# Patient Record
Sex: FEMALE | Race: OTHER | NOT HISPANIC OR LATINO | Employment: UNEMPLOYED | URBAN - METROPOLITAN AREA
[De-identification: names, ages, dates, MRNs, and addresses within clinical notes are randomized per-mention and may not be internally consistent; named-entity substitution may affect disease eponyms.]

---

## 2017-04-10 ENCOUNTER — TELEPHONE (OUTPATIENT)
Dept: FAMILY MEDICINE | Facility: CLINIC | Age: 37
End: 2017-04-10

## 2017-04-10 ENCOUNTER — PATIENT MESSAGE (OUTPATIENT)
Dept: FAMILY MEDICINE | Facility: CLINIC | Age: 37
End: 2017-04-10

## 2017-04-10 DIAGNOSIS — Z79.899 MEDICATION MANAGEMENT: ICD-10-CM

## 2017-04-10 DIAGNOSIS — Z13.220 SCREENING, LIPID: ICD-10-CM

## 2017-04-10 DIAGNOSIS — I95.9 HYPOTENSION, UNSPECIFIED HYPOTENSION TYPE: ICD-10-CM

## 2017-04-10 DIAGNOSIS — R53.83 FATIGUE, UNSPECIFIED TYPE: Primary | ICD-10-CM

## 2017-04-10 NOTE — TELEPHONE ENCOUNTER
Patient will be seeing my Wednesday, can you please schedule her for fasting labs tomorrow in advance of the visit? Thanks!

## 2017-04-10 NOTE — TELEPHONE ENCOUNTER
----- Message from Kings Aiken sent at 4/10/2017  9:22 AM CDT -----  Contact: Pichardo//[phone]  She has low bp and has been dizzy for the last three days and would like to be seen today.

## 2017-04-10 NOTE — TELEPHONE ENCOUNTER
----- Message from Angelo Ricardo sent at 4/10/2017 10:17 AM CDT -----  Contact: Kylee ()/[phone]  Patient's  called to possibly reschedule appointment.  Please advise.

## 2017-04-12 ENCOUNTER — LAB VISIT (OUTPATIENT)
Dept: LAB | Facility: HOSPITAL | Age: 37
End: 2017-04-12
Attending: FAMILY MEDICINE
Payer: COMMERCIAL

## 2017-04-12 ENCOUNTER — OFFICE VISIT (OUTPATIENT)
Dept: FAMILY MEDICINE | Facility: CLINIC | Age: 37
End: 2017-04-12
Payer: COMMERCIAL

## 2017-04-12 VITALS
TEMPERATURE: 99 F | HEIGHT: 61 IN | OXYGEN SATURATION: 99 % | SYSTOLIC BLOOD PRESSURE: 98 MMHG | BODY MASS INDEX: 24.92 KG/M2 | RESPIRATION RATE: 16 BRPM | DIASTOLIC BLOOD PRESSURE: 61 MMHG | WEIGHT: 132 LBS | HEART RATE: 70 BPM

## 2017-04-12 DIAGNOSIS — R00.2 PALPITATIONS: ICD-10-CM

## 2017-04-12 DIAGNOSIS — Z79.899 MEDICATION MANAGEMENT: ICD-10-CM

## 2017-04-12 DIAGNOSIS — I95.9 HYPOTENSION, UNSPECIFIED HYPOTENSION TYPE: ICD-10-CM

## 2017-04-12 DIAGNOSIS — R20.0 NUMBNESS AND TINGLING: ICD-10-CM

## 2017-04-12 DIAGNOSIS — K21.9 GASTROESOPHAGEAL REFLUX DISEASE, ESOPHAGITIS PRESENCE NOT SPECIFIED: ICD-10-CM

## 2017-04-12 DIAGNOSIS — R53.81 OTHER MALAISE AND FATIGUE: ICD-10-CM

## 2017-04-12 DIAGNOSIS — Z13.220 SCREENING, LIPID: ICD-10-CM

## 2017-04-12 DIAGNOSIS — R20.2 NUMBNESS AND TINGLING: ICD-10-CM

## 2017-04-12 DIAGNOSIS — R53.83 OTHER MALAISE AND FATIGUE: ICD-10-CM

## 2017-04-12 DIAGNOSIS — I95.9 HYPOTENSION, UNSPECIFIED HYPOTENSION TYPE: Primary | ICD-10-CM

## 2017-04-12 DIAGNOSIS — R53.83 FATIGUE, UNSPECIFIED TYPE: ICD-10-CM

## 2017-04-12 LAB
25(OH)D3+25(OH)D2 SERPL-MCNC: 19 NG/ML
ALBUMIN SERPL BCP-MCNC: 4.2 G/DL
ALP SERPL-CCNC: 64 U/L
ALT SERPL W/O P-5'-P-CCNC: 24 U/L
ANION GAP SERPL CALC-SCNC: 8 MMOL/L
AST SERPL-CCNC: 25 U/L
BASOPHILS # BLD AUTO: 0.04 K/UL
BASOPHILS NFR BLD: 0.5 %
BILIRUB SERPL-MCNC: 0.3 MG/DL
BUN SERPL-MCNC: 12 MG/DL
CALCIUM SERPL-MCNC: 9.6 MG/DL
CHLORIDE SERPL-SCNC: 107 MMOL/L
CHOLEST/HDLC SERPL: 2.9 {RATIO}
CO2 SERPL-SCNC: 26 MMOL/L
CREAT SERPL-MCNC: 0.8 MG/DL
DIFFERENTIAL METHOD: NORMAL
EOSINOPHIL # BLD AUTO: 0.1 K/UL
EOSINOPHIL NFR BLD: 1.9 %
ERYTHROCYTE [DISTWIDTH] IN BLOOD BY AUTOMATED COUNT: 13.6 %
EST. GFR  (AFRICAN AMERICAN): >60 ML/MIN/1.73 M^2
EST. GFR  (NON AFRICAN AMERICAN): >60 ML/MIN/1.73 M^2
FERRITIN SERPL-MCNC: 61 NG/ML
GLUCOSE SERPL-MCNC: 78 MG/DL
HCT VFR BLD AUTO: 39.5 %
HDL/CHOLESTEROL RATIO: 34.7 %
HDLC SERPL-MCNC: 144 MG/DL
HDLC SERPL-MCNC: 50 MG/DL
HGB BLD-MCNC: 13 G/DL
IRON SERPL-MCNC: 52 UG/DL
LDLC SERPL CALC-MCNC: 80.6 MG/DL
LYMPHOCYTES # BLD AUTO: 2.5 K/UL
LYMPHOCYTES NFR BLD: 32.9 %
MCH RBC QN AUTO: 28.1 PG
MCHC RBC AUTO-ENTMCNC: 32.9 %
MCV RBC AUTO: 85 FL
MONOCYTES # BLD AUTO: 0.4 K/UL
MONOCYTES NFR BLD: 5.7 %
NEUTROPHILS # BLD AUTO: 4.4 K/UL
NEUTROPHILS NFR BLD: 58.7 %
NONHDLC SERPL-MCNC: 94 MG/DL
PLATELET # BLD AUTO: 254 K/UL
PMV BLD AUTO: 11.4 FL
POTASSIUM SERPL-SCNC: 4.2 MMOL/L
PROT SERPL-MCNC: 7.6 G/DL
RBC # BLD AUTO: 4.63 M/UL
SATURATED IRON: 13 %
SODIUM SERPL-SCNC: 141 MMOL/L
TOTAL IRON BINDING CAPACITY: 388 UG/DL
TRANSFERRIN SERPL-MCNC: 262 MG/DL
TRIGL SERPL-MCNC: 67 MG/DL
TSH SERPL DL<=0.005 MIU/L-ACNC: 2.53 UIU/ML
TSH SERPL DL<=0.005 MIU/L-ACNC: 2.53 UIU/ML
WBC # BLD AUTO: 7.54 K/UL

## 2017-04-12 PROCEDURE — 84443 ASSAY THYROID STIM HORMONE: CPT

## 2017-04-12 PROCEDURE — 82728 ASSAY OF FERRITIN: CPT

## 2017-04-12 PROCEDURE — 83540 ASSAY OF IRON: CPT

## 2017-04-12 PROCEDURE — 80061 LIPID PANEL: CPT

## 2017-04-12 PROCEDURE — 1160F RVW MEDS BY RX/DR IN RCRD: CPT | Mod: S$GLB,,, | Performed by: FAMILY MEDICINE

## 2017-04-12 PROCEDURE — 99999 PR PBB SHADOW E&M-EST. PATIENT-LVL III: CPT | Mod: PBBFAC,,, | Performed by: FAMILY MEDICINE

## 2017-04-12 PROCEDURE — 99214 OFFICE O/P EST MOD 30 MIN: CPT | Mod: S$GLB,,, | Performed by: FAMILY MEDICINE

## 2017-04-12 PROCEDURE — 80053 COMPREHEN METABOLIC PANEL: CPT

## 2017-04-12 PROCEDURE — 83036 HEMOGLOBIN GLYCOSYLATED A1C: CPT

## 2017-04-12 PROCEDURE — 85025 COMPLETE CBC W/AUTO DIFF WBC: CPT

## 2017-04-12 PROCEDURE — 82306 VITAMIN D 25 HYDROXY: CPT

## 2017-04-12 PROCEDURE — 36415 COLL VENOUS BLD VENIPUNCTURE: CPT

## 2017-04-12 NOTE — PROGRESS NOTES
Office Visit    Patient Name: Daya Limon    : 1980  MRN: [id number]    Subjective:  Daya is a 37 y.o. female who presents today for:    Dizziness (states that B/P readings have bben low); Hand Pain (right hand); and Numbness (to feet)      Patient was feeling overall well until about 8 days ago she started noticing episodes of of lightheadedness, especially with going from sitting to standing and with being on feet for a while. No falls. Feels like she may faint and like she is weak.  Blood pressures have been in the 80s systolic, though this is not much lower than her usual baseline of 90s systolic. Not generally drinking much water. No fevers/chills/ sore throat/ diarrhea. Having regular menstrual cycles that are not reported as overly heavy.     C/o right hand pain upon awakening that at times travels up right arm but this pain totally goes away once she gets up and starts moving around.  No joint pain, redness or swelling. Has some tinging in the bottoms of her feet on occasion but it is not overly bothersome or accompanied by back pain or leg weakness.       Past Medical History  No past medical history on file.    Past Surgical History  Past Surgical History:   Procedure Laterality Date    VAGINAL DELIVERY      X2       Family History  Family History   Problem Relation Age of Onset    Diabetes Mother     Diabetes Father     Kidney disease Father     Kidney failure Father     Breast cancer Neg Hx     Colon cancer Neg Hx     Ovarian cancer Neg Hx     Hypertension Neg Hx     Stroke Neg Hx        Social History  Social History     Social History    Marital status:      Spouse name: N/A    Number of children: 2    Years of education: N/A     Occupational History    Not on file.     Social History Main Topics    Smoking status: Never Smoker    Smokeless tobacco: Never Used    Alcohol use No    Drug use: No    Sexual activity: Yes     Partners: Male     Birth control/ protection:  "None     Other Topics Concern    Not on file     Social History Narrative       Current Medications  Medications reviewed and updated.     Allergies   Review of patient's allergies indicates:  No Known Allergies    Review of Systems (Pertinent positives)  Review of Systems   Constitutional: Positive for fatigue. Negative for chills and fever.   Respiratory: Negative for shortness of breath.    Cardiovascular: Positive for palpitations (when lightheaded).   Gastrointestinal: Negative for diarrhea, nausea and vomiting.   Genitourinary: Negative for menstrual problem.   Neurological: Positive for light-headedness and numbness (more tingling on bottoms of feets). Negative for syncope.       BP 98/61  Pulse 70  Temp 98.7 °F (37.1 °C) (Oral)   Resp 16  Ht 5' 1" (1.549 m)  Wt 59.9 kg (132 lb)  LMP  (LMP Unknown)  SpO2 99%  BMI 24.94 kg/m2    Physical Exam   Constitutional: She is oriented to person, place, and time. She appears well-developed and well-nourished. No distress.   HENT:   Head: Normocephalic and atraumatic.   Eyes: Conjunctivae are normal.   Cardiovascular: Normal rate, regular rhythm, normal heart sounds and intact distal pulses.    Pulmonary/Chest: Effort normal and breath sounds normal.   Musculoskeletal: She exhibits no edema.        Right shoulder: She exhibits pain (positive empty can test but otw overall unremarkable exam). She exhibits normal range of motion and no swelling.   Neurological: She is alert and oriented to person, place, and time.   Skin: Skin is warm and dry.   Psychiatric: She has a normal mood and affect.         Assessment/Plan:  Daya Limon is a 37 y.o. female who presents today for :    Daya was seen today for dizziness, hand pain and numbness.    Diagnoses and all orders for this visit:    Hypotension, unspecified hypotension type  Comments:  check labs and increase water consumption  Orders:  -     Ferritin; Future  -     IRON AND TIBC; Future    Gastroesophageal reflux " disease, esophagitis presence not specified  Comments:  continue Nexium as needed or consider Zantac 150-300 mg daily     Numbness and tingling  Comments:  no concerning neurologic features-- check labs for sugar, thyroid etc    Palpitations  Comments:  associated with lower BP readings, pending lab results and how she feels with increased fluids, consider holter monitor as next step in work up             ICD-10-CM ICD-9-CM    1. Hypotension, unspecified hypotension type I95.9 458.9 Ferritin      IRON AND TIBC    check labs and increase water consumption   2. Gastroesophageal reflux disease, esophagitis presence not specified K21.9 530.81     continue Nexium as needed or consider Zantac 150-300 mg daily    3. Numbness and tingling R20.0 782.0     R20.2      no concerning neurologic features-- check labs for sugar, thyroid etc   4. Palpitations R00.2 785.1     associated with lower BP readings, pending lab results and how she feels with increased fluids, consider holter monitor as next step in work up        Return for return as needed for new concerns and will call with lab results.

## 2017-04-13 LAB
ESTIMATED AVG GLUCOSE: 111 MG/DL
HBA1C MFR BLD HPLC: 5.5 %

## 2017-04-16 ENCOUNTER — TELEPHONE (OUTPATIENT)
Dept: FAMILY MEDICINE | Facility: CLINIC | Age: 37
End: 2017-04-16

## 2017-04-16 DIAGNOSIS — E55.9 VITAMIN D DEFICIENCY: ICD-10-CM

## 2017-04-16 RX ORDER — ERGOCALCIFEROL 1.25 MG/1
50000 CAPSULE ORAL
Qty: 15 CAPSULE | Refills: 3 | Status: SHIPPED | OUTPATIENT
Start: 2017-04-16 | End: 2018-08-27

## 2017-04-18 ENCOUNTER — TELEPHONE (OUTPATIENT)
Dept: FAMILY MEDICINE | Facility: CLINIC | Age: 37
End: 2017-04-18

## 2017-06-13 ENCOUNTER — OFFICE VISIT (OUTPATIENT)
Dept: OPTOMETRY | Facility: CLINIC | Age: 37
End: 2017-06-13
Payer: COMMERCIAL

## 2017-06-13 DIAGNOSIS — H04.123 DRY EYE SYNDROME, BILATERAL: ICD-10-CM

## 2017-06-13 DIAGNOSIS — H52.203 HYPEROPIC ASTIGMATISM, BILATERAL: ICD-10-CM

## 2017-06-13 DIAGNOSIS — R51.9 HEADACHE, UNSPECIFIED HEADACHE TYPE: Primary | ICD-10-CM

## 2017-06-13 PROCEDURE — 99999 PR PBB SHADOW E&M-EST. PATIENT-LVL I: CPT | Mod: PBBFAC,,, | Performed by: OPTOMETRIST

## 2017-06-13 PROCEDURE — 92015 DETERMINE REFRACTIVE STATE: CPT | Mod: S$GLB,,, | Performed by: OPTOMETRIST

## 2017-06-13 PROCEDURE — 92014 COMPRE OPH EXAM EST PT 1/>: CPT | Mod: S$GLB,,, | Performed by: OPTOMETRIST

## 2017-06-13 NOTE — LETTER
June 13, 2017      [place] [id number] [facility] [address]           [facility]  [address]  [place] [address]  Phone: [phone]  Fax: [phone]          Patient: Daya Limon   MR Number: [id number]   YOB: 1980   Date of Visit: 6/13/2017       Dear [facility]:    Thank you for referring Daya Limon to me for evaluation. Attached you will find relevant portions of my assessment and plan of care.    If you have questions, please do not hesitate to call me. I look forward to following Daya Limon along with you.    Sincerely,    Brittni Kumar, OD    Enclosure  CC:  No Recipients    If you would like to receive this communication electronically, please contact [email or web address] or [phone] to request more information on [facility] Link access.    For providers and/or their staff who would like to refer a patient to [facility], please contact us through our one-stop-shop provider referral line, Nathen Campbell, at [phone].    If you feel you have received this communication in error or would no longer like to receive these types of communications, please e-mail [email or web address]

## 2017-06-13 NOTE — PROGRESS NOTES
Subjective:       Patient ID: Daya Limon is a 37 y.o. female      Chief Complaint   Patient presents with    Concerns About Ocular Health     History of Present Illness  Dls: 2/3/16 Dr. Buckley    Pt c/o blurry vision at near ou, pt unsure how long it's been going on.   First started noticing in May. Pt does not wear any glasses. Pt c/o hoover's,   takes tylenol for relief. Has not seen PCP. 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 No floaters. Pt c/o dry eyes ou itching ou no tearing no burning no pain.     Eye meds:   Visine ou prn         Assessment/Plan:     1. 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 Most likely eye strain due to refractive error. Pt to try out glasses for near work, if symptoms don't improve, recommend follow up with PCP for workup.    2. Dry eye syndrome, bilateral  Recommend artificial tears. 1 drop 4x per day and PRN. Discussed different drop options - AT/PFAT/gel/ointment. Chronicity of disease and treatment discussed.    3. Hyperopic astigmatism, bilateral  Educated patient on refractive error and discussed lens options. Near work only. Dispensed updated spectacle Rx. Educated about adaptation period to new specs.    Eyeglass Final Rx     Eyeglass Final Rx       Sphere Cylinder Axis    Right +0.75 +0.75 100    Left +1.00 +0.50 060    Type:  SVL-near only    Expiration Date:  6/14/2018                  Return in about 1 year (around 6/13/2018) for Annual eye exam.

## 2017-09-12 ENCOUNTER — OFFICE VISIT (OUTPATIENT)
Dept: FAMILY MEDICINE | Facility: CLINIC | Age: 37
End: 2017-09-12
Attending: FAMILY MEDICINE
Payer: COMMERCIAL

## 2017-09-12 VITALS
TEMPERATURE: 99 F | HEIGHT: 61 IN | WEIGHT: 140.19 LBS | BODY MASS INDEX: 26.47 KG/M2 | SYSTOLIC BLOOD PRESSURE: 97 MMHG | DIASTOLIC BLOOD PRESSURE: 73 MMHG | OXYGEN SATURATION: 100 % | HEART RATE: 74 BPM

## 2017-09-12 DIAGNOSIS — R50.9 FEVER, UNSPECIFIED FEVER CAUSE: Primary | ICD-10-CM

## 2017-09-12 DIAGNOSIS — J02.9 SORETHROAT: ICD-10-CM

## 2017-09-12 LAB
CTP QC/QA: YES
CTP QC/QA: YES
FLUAV AG NPH QL: NEGATIVE
FLUBV AG NPH QL: NEGATIVE
S PYO RRNA THROAT QL PROBE: NEGATIVE

## 2017-09-12 PROCEDURE — 99999 PR PBB SHADOW E&M-EST. PATIENT-LVL III: CPT | Mod: PBBFAC,,, | Performed by: FAMILY MEDICINE

## 2017-09-12 PROCEDURE — 87804 INFLUENZA ASSAY W/OPTIC: CPT | Mod: QW,S$GLB,, | Performed by: FAMILY MEDICINE

## 2017-09-12 PROCEDURE — 3008F BODY MASS INDEX DOCD: CPT | Mod: S$GLB,,, | Performed by: FAMILY MEDICINE

## 2017-09-12 PROCEDURE — 87880 STREP A ASSAY W/OPTIC: CPT | Mod: QW,S$GLB,, | Performed by: FAMILY MEDICINE

## 2017-09-12 PROCEDURE — 99214 OFFICE O/P EST MOD 30 MIN: CPT | Mod: 25,S$GLB,, | Performed by: FAMILY MEDICINE

## 2017-09-12 PROCEDURE — 87081 CULTURE SCREEN ONLY: CPT

## 2017-09-12 NOTE — PROGRESS NOTES
Subjective:       Patient ID: Daya Limon is a 37 y.o. female.    Chief Complaint: Fever; Sore Throat; and Jaw Pain    37 yr old pleasant  female with no significant medical history presents today as new patient to me and for evaluation of fever and sore throat x 1 day. She has sick contacts as her two of her daughters tested positive for strep throat. Mild cough and burning pain in throat and also jaw pain. Details as follows -    History as below - reviewed       Sore Throat    This is a new problem. The current episode started yesterday. The problem has been unchanged. The pain is worse on the right side. The maximum temperature recorded prior to her arrival was 100.4 - 100.9 F. The fever has been present for less than 1 day. The pain is at a severity of 3/10. The pain is mild. Associated symptoms include congestion and coughing. Pertinent negatives include no ear discharge, headaches or shortness of breath. She has had exposure to strep. She has had no exposure to mono. She has tried NSAIDs and gargles for the symptoms. The treatment provided no relief.   Fever    This is a new problem. The current episode started yesterday. The problem occurs intermittently. The problem has been gradually improving. The maximum temperature noted was 100 to 100.9 F. The temperature was taken using a tympanic thermometer. Associated symptoms include congestion, coughing and a sore throat. Pertinent negatives include no chest pain, headaches, nausea, urinary pain or wheezing. She has tried acetaminophen, NSAIDs and fluids for the symptoms. The treatment provided mild relief.   Risk factors: sick contacts    Risk factors: no contaminated food, no contaminated water, no hx of cancer, no recent sickness and no recent travel      Review of Systems   Constitutional: Positive for fever. Negative for activity change, diaphoresis and unexpected weight change.   HENT: Positive for congestion, mouth sores, postnasal drip, rhinorrhea  and sore throat. Negative for ear discharge, hearing loss and voice change.    Eyes: Negative.  Negative for pain, discharge and visual disturbance.   Respiratory: Positive for cough. Negative for chest tightness, shortness of breath and wheezing.    Cardiovascular: Negative.  Negative for chest pain.   Gastrointestinal: Negative.  Negative for abdominal distention, anal bleeding, constipation and nausea.   Endocrine: Negative.  Negative for cold intolerance, polydipsia and polyuria.   Genitourinary: Negative.  Negative for decreased urine volume, difficulty urinating, dysuria, frequency, menstrual problem and vaginal pain.   Musculoskeletal: Positive for arthralgias and myalgias. Negative for back pain and gait problem.   Skin: Negative.  Negative for color change, pallor and wound.   Allergic/Immunologic: Negative.  Negative for environmental allergies and immunocompromised state.   Neurological: Negative.  Negative for dizziness, tremors, seizures, speech difficulty and headaches.   Hematological: Negative.  Negative for adenopathy. Does not bruise/bleed easily.   Psychiatric/Behavioral: Negative.  Negative for agitation, confusion, decreased concentration, hallucinations, self-injury and suicidal ideas. The patient is not nervous/anxious.        PMH/PSH/FH/SH/MED/ALLERGY reviewed    Objective:       Vitals:    09/12/17 1336   BP: 97/73   Pulse: 74   Temp: 99 °F (37.2 °C)       Physical Exam   Constitutional: She is oriented to person, place, and time. She appears well-developed and well-nourished. No distress.   HENT:   Head: Normocephalic and atraumatic.   Right Ear: External ear normal.   Left Ear: External ear normal.   Nose: Nose normal.   Mouth/Throat: Posterior oropharyngeal edema and posterior oropharyngeal erythema present. No oropharyngeal exudate.       Eyes: Conjunctivae and EOM are normal. Pupils are equal, round, and reactive to light. Right eye exhibits no discharge. Left eye exhibits no discharge.  No scleral icterus.   Neck: Normal range of motion. Neck supple. No JVD present. No tracheal deviation present. No thyromegaly present.   Cardiovascular: Normal rate, regular rhythm, normal heart sounds and intact distal pulses.  Exam reveals no gallop and no friction rub.    No murmur heard.  Pulmonary/Chest: Effort normal and breath sounds normal. No stridor. She has no wheezes. She has no rales. She exhibits no tenderness.   Abdominal: Soft. Bowel sounds are normal. She exhibits no distension and no mass. There is no tenderness. There is no rebound and no guarding. No hernia.   Musculoskeletal: Normal range of motion. She exhibits no edema or tenderness.   Lymphadenopathy:     She has no cervical adenopathy.   Neurological: She is alert and oriented to person, place, and time. She has normal reflexes. She displays normal reflexes. No cranial nerve deficit. She exhibits normal muscle tone. Coordination normal.   Skin: Skin is warm and dry. No rash noted. She is not diaphoretic. No erythema. No pallor.   Psychiatric: She has a normal mood and affect. Her behavior is normal. Judgment and thought content normal.       Office Visit on 09/12/2017   Component Date Value Ref Range Status    Rapid Strep A Screen 09/12/2017 Negative  Negative Final     Acceptable 09/12/2017 Yes   Final    Rapid Influenza A Ag 09/12/2017 Negative  Negative Final    Rapid Influenza B Ag 09/12/2017 Negative  Negative Final     Acceptable 09/12/2017 Yes   Final       Assessment:       1. Fever, unspecified fever cause    2. Sorethroat        Plan:       Daya was seen today for fever, sore throat and jaw pain.    Diagnoses and all orders for this visit:    Fever, unspecified fever cause  -     POCT Rapid Strep A  -     POCT Influenza A/B  -     Strep A culture, throat    Sorethroat  -     POCT Rapid Strep A  -     POCT Influenza A/B  -     Strep A culture, throat      Fever/sorethroat  -Advised warm liquids,  warm/salt water gargles, halls lozenges, avoid cold drinks/rocio/ice cream  -rapid strep and flu negative  -culture  -likely viral or allergic    Spent adequate time in obtaining history and explaining differentials    40 minutes spent during this visit of which greater than 50% devoted to face-face counseling and coordination of care regarding diagnosis and management plan    Return if symptoms worsen or fail to improve.

## 2017-09-15 LAB — BACTERIA THROAT CULT: NORMAL

## 2018-08-27 ENCOUNTER — OFFICE VISIT (OUTPATIENT)
Dept: FAMILY MEDICINE | Facility: CLINIC | Age: 38
End: 2018-08-27
Payer: COMMERCIAL

## 2018-08-27 ENCOUNTER — LAB VISIT (OUTPATIENT)
Dept: LAB | Facility: HOSPITAL | Age: 38
End: 2018-08-27
Attending: FAMILY MEDICINE
Payer: COMMERCIAL

## 2018-08-27 VITALS
HEIGHT: 61 IN | BODY MASS INDEX: 26.02 KG/M2 | HEART RATE: 77 BPM | SYSTOLIC BLOOD PRESSURE: 105 MMHG | WEIGHT: 137.81 LBS | OXYGEN SATURATION: 97 % | DIASTOLIC BLOOD PRESSURE: 73 MMHG

## 2018-08-27 DIAGNOSIS — Z79.899 MEDICATION MANAGEMENT: ICD-10-CM

## 2018-08-27 DIAGNOSIS — Z13.220 LIPID SCREENING: ICD-10-CM

## 2018-08-27 DIAGNOSIS — N92.6 MENSTRUAL IRREGULARITY: ICD-10-CM

## 2018-08-27 DIAGNOSIS — N92.6 MENSTRUAL IRREGULARITY: Primary | ICD-10-CM

## 2018-08-27 DIAGNOSIS — R30.0 DYSURIA: ICD-10-CM

## 2018-08-27 DIAGNOSIS — E55.9 VITAMIN D DEFICIENCY: ICD-10-CM

## 2018-08-27 DIAGNOSIS — K21.9 GASTROESOPHAGEAL REFLUX DISEASE, ESOPHAGITIS PRESENCE NOT SPECIFIED: ICD-10-CM

## 2018-08-27 DIAGNOSIS — G56.21 CUBITAL TUNNEL SYNDROME ON RIGHT: ICD-10-CM

## 2018-08-27 LAB
25(OH)D3+25(OH)D2 SERPL-MCNC: 13 NG/ML
ALBUMIN SERPL BCP-MCNC: 4.3 G/DL
ALP SERPL-CCNC: 73 U/L
ALT SERPL W/O P-5'-P-CCNC: 22 U/L
ANION GAP SERPL CALC-SCNC: 4 MMOL/L
AST SERPL-CCNC: 25 U/L
BASOPHILS # BLD AUTO: 0.02 K/UL
BASOPHILS NFR BLD: 0.3 %
BILIRUB SERPL-MCNC: 0.5 MG/DL
BUN SERPL-MCNC: 10 MG/DL
CALCIUM SERPL-MCNC: 9.6 MG/DL
CHLORIDE SERPL-SCNC: 106 MMOL/L
CHOLEST SERPL-MCNC: 182 MG/DL
CHOLEST/HDLC SERPL: 3.4 {RATIO}
CO2 SERPL-SCNC: 30 MMOL/L
CREAT SERPL-MCNC: 0.6 MG/DL
DIFFERENTIAL METHOD: NORMAL
EOSINOPHIL # BLD AUTO: 0.2 K/UL
EOSINOPHIL NFR BLD: 3.3 %
ERYTHROCYTE [DISTWIDTH] IN BLOOD BY AUTOMATED COUNT: 13.9 %
EST. GFR  (AFRICAN AMERICAN): >60 ML/MIN/1.73 M^2
EST. GFR  (NON AFRICAN AMERICAN): >60 ML/MIN/1.73 M^2
ESTIMATED AVG GLUCOSE: 103 MG/DL
FERRITIN SERPL-MCNC: 58 NG/ML
GLUCOSE SERPL-MCNC: 81 MG/DL
HBA1C MFR BLD HPLC: 5.2 %
HCG INTACT+B SERPL-ACNC: <1.2 MIU/ML
HCT VFR BLD AUTO: 42.2 %
HDLC SERPL-MCNC: 53 MG/DL
HDLC SERPL: 29.1 %
HGB BLD-MCNC: 13.6 G/DL
IRON SERPL-MCNC: 67 UG/DL
LDLC SERPL CALC-MCNC: 115.4 MG/DL
LYMPHOCYTES # BLD AUTO: 1.9 K/UL
LYMPHOCYTES NFR BLD: 27.9 %
MCH RBC QN AUTO: 27.5 PG
MCHC RBC AUTO-ENTMCNC: 32.2 G/DL
MCV RBC AUTO: 85 FL
MONOCYTES # BLD AUTO: 0.4 K/UL
MONOCYTES NFR BLD: 5.1 %
NEUTROPHILS # BLD AUTO: 4.4 K/UL
NEUTROPHILS NFR BLD: 63.3 %
NONHDLC SERPL-MCNC: 129 MG/DL
PLATELET # BLD AUTO: 253 K/UL
PMV BLD AUTO: 11.8 FL
POTASSIUM SERPL-SCNC: 3.9 MMOL/L
PROT SERPL-MCNC: 7.7 G/DL
RBC # BLD AUTO: 4.95 M/UL
SATURATED IRON: 16 %
SODIUM SERPL-SCNC: 140 MMOL/L
TOTAL IRON BINDING CAPACITY: 408 UG/DL
TRANSFERRIN SERPL-MCNC: 276 MG/DL
TRIGL SERPL-MCNC: 68 MG/DL
TSH SERPL DL<=0.005 MIU/L-ACNC: 3.74 UIU/ML
WBC # BLD AUTO: 6.92 K/UL

## 2018-08-27 PROCEDURE — 83540 ASSAY OF IRON: CPT

## 2018-08-27 PROCEDURE — 80061 LIPID PANEL: CPT

## 2018-08-27 PROCEDURE — 36415 COLL VENOUS BLD VENIPUNCTURE: CPT

## 2018-08-27 PROCEDURE — 99214 OFFICE O/P EST MOD 30 MIN: CPT | Mod: S$GLB,,, | Performed by: FAMILY MEDICINE

## 2018-08-27 PROCEDURE — 3008F BODY MASS INDEX DOCD: CPT | Mod: CPTII,S$GLB,, | Performed by: FAMILY MEDICINE

## 2018-08-27 PROCEDURE — 82728 ASSAY OF FERRITIN: CPT

## 2018-08-27 PROCEDURE — 84443 ASSAY THYROID STIM HORMONE: CPT

## 2018-08-27 PROCEDURE — 80053 COMPREHEN METABOLIC PANEL: CPT

## 2018-08-27 PROCEDURE — 82306 VITAMIN D 25 HYDROXY: CPT

## 2018-08-27 PROCEDURE — 99999 PR PBB SHADOW E&M-EST. PATIENT-LVL III: CPT | Mod: PBBFAC,,, | Performed by: FAMILY MEDICINE

## 2018-08-27 PROCEDURE — 84702 CHORIONIC GONADOTROPIN TEST: CPT

## 2018-08-27 PROCEDURE — 83036 HEMOGLOBIN GLYCOSYLATED A1C: CPT

## 2018-08-27 PROCEDURE — 85025 COMPLETE CBC W/AUTO DIFF WBC: CPT

## 2018-08-27 RX ORDER — ERGOCALCIFEROL 1.25 MG/1
50000 CAPSULE ORAL
Qty: 15 CAPSULE | Refills: 3 | Status: SHIPPED | OUTPATIENT
Start: 2018-08-27 | End: 2019-06-04 | Stop reason: ALTCHOICE

## 2018-08-27 NOTE — PROGRESS NOTES
Office Visit    Patient Name: Daya Limon    : 1980  MRN: [id number]    Subjective:  Daya is a 38 y.o. female who presents today for:    Abdominal Pain (period was from -, 4 days later she started spotting again for 1 day)    37 yo patient of mine with PMH of GERD here today to discuss abdominal pain and menstrual irregularity with some changes in the quality of her blood flow.  She reports having generally very regular periods, but she reports that last month she did not get a period.  She then had a fairly normal period fom the  to th of this month, but after her period ended she then started having some irregular spotting with some dark brown blood.  She is not having vaginal discharge or concern for infection.  She does not believe she is pregnant as she is not having symptoms of pregnancy and they use condoms for contraception.    No constipation or diarrhea.  Urination is normal except for some increased tingling/ mild burning discomfort.      She does have some mild cramping with her periods and endorses periodic epigastric burning with reflux when this flares up.  She is continuing to take Nexium as needed and this helps significantly.    Secondary, she is complaining of significant right arm pain is centered around her right elbow.  The pain occurs only when she is sleeping at night and often wakes her up.  She describes a burning shooting pain that originates in her elbow region and shoe all the way down her forearm into the ulnar side of her head performed.  It several minutes for him to wake up.  During the day she is able to perform any movements that she wishes with her arm, shoulder, elbow.  She is not having weakness, numbness or tingling during the day.  When questioned about her sleep position, she reports that she sleeps with her elbow totally black with her forearm up next to her body near her breast.         Past Medical History  No past medical history on file.    Past  Surgical History  Past Surgical History:   Procedure Laterality Date    VAGINAL DELIVERY      X2       Family History  Family History   Problem Relation Age of Onset    Diabetes Mother     Diabetes Father     Kidney disease Father     Kidney failure Father     Cataracts Father     No Known Problems Sister     No Known Problems Brother     No Known Problems Maternal Aunt     No Known Problems Maternal Uncle     No Known Problems Paternal Aunt     No Known Problems Paternal Uncle     No Known Problems Maternal Grandmother     No Known Problems Maternal Grandfather     No Known Problems Paternal Grandmother     No Known Problems Paternal Grandfather     Breast cancer Neg Hx     Colon cancer Neg Hx     Ovarian cancer Neg Hx     Hypertension Neg Hx     Stroke Neg Hx     Amblyopia Neg Hx     Blindness Neg Hx     Cancer Neg Hx     Glaucoma Neg Hx     Macular degeneration Neg Hx     Retinal detachment Neg Hx     Strabismus Neg Hx     Thyroid disease Neg Hx        Social History  Social History     Socioeconomic History    Marital status:      Spouse name: Not on file    Number of children: 2    Years of education: Not on file    Highest education level: Not on file   Social Needs    Financial resource strain: Not on file    Food insecurity - worry: Not on file    Food insecurity - inability: Not on file    Transportation needs - medical: Not on file    Transportation needs - non-medical: Not on file   Occupational History    Not on file   Tobacco Use    Smoking status: Never Smoker    Smokeless tobacco: Never Used   Substance and Sexual Activity    Alcohol use: No    Drug use: No    Sexual activity: Yes     Partners: Male     Birth control/protection: None   Other Topics Concern    Not on file   Social History Narrative    Not on file       Current Medications  Medications reviewed and updated.     Allergies   Review of patient's allergies indicates:  No Known  "Allergies    Review of Systems (Pertinent positives)  Review of Systems   Constitutional: Positive for fatigue.   Genitourinary: Positive for dysuria, menstrual problem (irregularity) and pelvic pain.   Musculoskeletal: Positive for arthralgias.       /73   Pulse 77   Ht 5' 1" (1.549 m)   Wt 62.5 kg (137 lb 12.6 oz)   LMP 08/12/2018 (Exact Date)   SpO2 97%   BMI 26.03 kg/m²     Physical Exam   Constitutional: She is oriented to person, place, and time. She appears well-developed and well-nourished. No distress.   HENT:   Head: Normocephalic and atraumatic.   Eyes: Conjunctivae are normal.   Cardiovascular: Normal rate and regular rhythm.   Pulmonary/Chest: Effort normal and breath sounds normal.   Musculoskeletal: She exhibits no edema.        Right elbow: Tenderness found. Medial epicondyle and lateral epicondyle tenderness noted.   Neurological: She is alert and oriented to person, place, and time.   Skin: Skin is warm and dry.   Psychiatric: She has a normal mood and affect.   Vitals reviewed.        Assessment/Plan:  Daya Limon is a 38 y.o. female who presents today for :    Daya was seen today for abdominal pain.    Diagnoses and all orders for this visit:    Menstrual irregularity  -     Hemoglobin A1c; Future  -     Comprehensive metabolic panel; Future  -     CBC auto differential; Future  -     TSH; Future  -     Ferritin; Future  -     Iron and TIBC; Future  -     Pregnancy Test Screening, Serum; Future    Gastroesophageal reflux disease, esophagitis presence not specified    Vitamin D deficiency  -     ergocalciferol (ERGOCALCIFEROL) 50,000 unit Cap; Take 1 capsule (50,000 Units total) by mouth every 7 days.  -     Vitamin D; Future    Dysuria  -     Urinalysis; Future  -     Urine culture; Future    Medication management  -     Hemoglobin A1c; Future  -     Comprehensive metabolic panel; Future  -     Lipid panel; Future  -     CBC auto differential; Future  -     TSH; Future  -     Vitamin " D; Future  -     Ferritin; Future  -     Iron and TIBC; Future  -     Urinalysis; Future    Lipid screening  -     Lipid panel; Future    Cubital tunnel syndrome on right  Comments:  Elbow/nerve pain c/w cubital tunnel.Likely secondary to poor sleeping position.  Advised on hugging a pillow to keep arm in neutral position             ICD-10-CM ICD-9-CM    1. Menstrual irregularity N92.6 626.4 Hemoglobin A1c      Comprehensive metabolic panel      CBC auto differential      TSH      Ferritin      Iron and TIBC      Pregnancy Test Screening, Serum   2. Gastroesophageal reflux disease, esophagitis presence not specified K21.9 530.81    3. Vitamin D deficiency E55.9 268.9 ergocalciferol (ERGOCALCIFEROL) 50,000 unit Cap      Vitamin D   4. Dysuria R30.0 788.1 Urinalysis      Urine culture   5. Medication management Z79.899 V58.69 Hemoglobin A1c      Comprehensive metabolic panel      Lipid panel      CBC auto differential      TSH      Vitamin D      Ferritin      Iron and TIBC      Urinalysis   6. Lipid screening Z13.220 V77.91 Lipid panel   7. Cubital tunnel syndrome on right G56.21 354.2     Elbow/nerve pain c/w cubital tunnel.Likely secondary to poor sleeping position.  Advised on hugging a pillow to keep arm in neutral position        There are no Patient Instructions on file for this visit.      Follow-up for return as needed for new concerns, PAP as scheduled in september.

## 2018-09-18 ENCOUNTER — OFFICE VISIT (OUTPATIENT)
Dept: FAMILY MEDICINE | Facility: CLINIC | Age: 38
End: 2018-09-18
Payer: COMMERCIAL

## 2018-09-18 VITALS
DIASTOLIC BLOOD PRESSURE: 71 MMHG | SYSTOLIC BLOOD PRESSURE: 101 MMHG | BODY MASS INDEX: 26.1 KG/M2 | HEART RATE: 70 BPM | OXYGEN SATURATION: 99 % | HEIGHT: 61 IN | WEIGHT: 138.25 LBS

## 2018-09-18 DIAGNOSIS — K21.9 GASTROESOPHAGEAL REFLUX DISEASE, ESOPHAGITIS PRESENCE NOT SPECIFIED: ICD-10-CM

## 2018-09-18 DIAGNOSIS — B00.1 COLD SORE: ICD-10-CM

## 2018-09-18 DIAGNOSIS — Z11.3 SCREENING EXAMINATION FOR STD (SEXUALLY TRANSMITTED DISEASE): ICD-10-CM

## 2018-09-18 DIAGNOSIS — E55.9 VITAMIN D DEFICIENCY: ICD-10-CM

## 2018-09-18 DIAGNOSIS — N76.0 ACUTE VAGINITIS: ICD-10-CM

## 2018-09-18 DIAGNOSIS — Z01.419 ENCOUNTER FOR ROUTINE GYNECOLOGICAL EXAMINATION WITH PAPANICOLAOU SMEAR OF CERVIX: Primary | ICD-10-CM

## 2018-09-18 DIAGNOSIS — Z23 NEEDS FLU SHOT: ICD-10-CM

## 2018-09-18 DIAGNOSIS — Z79.899 MEDICATION MANAGEMENT: ICD-10-CM

## 2018-09-18 LAB
CANDIDA RRNA VAG QL PROBE: NEGATIVE
G VAGINALIS RRNA GENITAL QL PROBE: NEGATIVE
T VAGINALIS RRNA GENITAL QL PROBE: NEGATIVE

## 2018-09-18 PROCEDURE — 90471 IMMUNIZATION ADMIN: CPT | Mod: S$GLB,,, | Performed by: FAMILY MEDICINE

## 2018-09-18 PROCEDURE — 87624 HPV HI-RISK TYP POOLED RSLT: CPT

## 2018-09-18 PROCEDURE — 88175 CYTOPATH C/V AUTO FLUID REDO: CPT

## 2018-09-18 PROCEDURE — 99999 PR PBB SHADOW E&M-EST. PATIENT-LVL IV: CPT | Mod: PBBFAC,,, | Performed by: FAMILY MEDICINE

## 2018-09-18 PROCEDURE — 87480 CANDIDA DNA DIR PROBE: CPT

## 2018-09-18 PROCEDURE — 90686 IIV4 VACC NO PRSV 0.5 ML IM: CPT | Mod: S$GLB,,, | Performed by: FAMILY MEDICINE

## 2018-09-18 PROCEDURE — 87491 CHLMYD TRACH DNA AMP PROBE: CPT

## 2018-09-18 PROCEDURE — 87510 GARDNER VAG DNA DIR PROBE: CPT

## 2018-09-18 PROCEDURE — 99395 PREV VISIT EST AGE 18-39: CPT | Mod: 25,S$GLB,, | Performed by: FAMILY MEDICINE

## 2018-09-18 RX ORDER — VALACYCLOVIR HYDROCHLORIDE 1 G/1
1000 TABLET, FILM COATED ORAL 2 TIMES DAILY
Qty: 10 TABLET | Refills: 2 | Status: SHIPPED | OUTPATIENT
Start: 2018-09-18 | End: 2019-06-04

## 2018-09-18 NOTE — PROGRESS NOTES
Office Visit    Patient Name: aDya Limon    : 1980  MRN: [id number]    Subjective:  Daya is a 38 y.o. female who presents today for:    Annual Exam and Gynecologic Exam    Daya Limon presents today for annual wellness exam.      She is of child bearing age.  Menstrual cycle is a bit irregular recently as discussed at recent visit but labs, pregnancy test unremarkable.      She has been feeling overall well.  Elbow pain previously discussed as nocturnal cubital tunnel resolved with sleep positional adjustments. Still continuing to have some heart burn but resolves with nexium and notes spicy foods are a trigger. Also c/o some recent vaginal discharge and requests infection testing with her pap.     General lifestyle habits are as follows:  Diet is described as healthy, exercise is described as fair-- mild walking 15-20 min on most days, sleep is described as improving-- 6-7 hours nighlty-- children still wake her up at night but she otherwise sleeps ok. Weight is stable in the last year.      Immunizations: TDaP 2014, Influenza today      Screening Tests: PAP/ HPV, STD screening today     Eye/Dental: dental up to date this year, needs to schedule eye exam    Past Medical History  Past Medical History:   Diagnosis Date    GERD (gastroesophageal reflux disease) 2017    Vitamin D deficiency 2017       Past Surgical History  Past Surgical History:   Procedure Laterality Date    VAGINAL DELIVERY      X2       Family History  Family History   Problem Relation Age of Onset    Diabetes Mother     Diabetes Father     Kidney disease Father     Kidney failure Father     Cataracts Father     No Known Problems Sister     No Known Problems Brother     No Known Problems Maternal Aunt     No Known Problems Maternal Uncle     No Known Problems Paternal Aunt     No Known Problems Paternal Uncle     No Known Problems Maternal Grandmother     No Known Problems Maternal Grandfather     No Known  Problems Paternal Grandmother     No Known Problems Paternal Grandfather     Breast cancer Neg Hx     Colon cancer Neg Hx     Ovarian cancer Neg Hx     Hypertension Neg Hx     Stroke Neg Hx     Amblyopia Neg Hx     Blindness Neg Hx     Cancer Neg Hx     Glaucoma Neg Hx     Macular degeneration Neg Hx     Retinal detachment Neg Hx     Strabismus Neg Hx     Thyroid disease Neg Hx        Social History  Social History     Socioeconomic History    Marital status:      Spouse name: Not on file    Number of children: 2    Years of education: Not on file    Highest education level: Not on file   Social Needs    Financial resource strain: Not on file    Food insecurity - worry: Not on file    Food insecurity - inability: Not on file    Transportation needs - medical: Not on file    Transportation needs - non-medical: Not on file   Occupational History    Not on file   Tobacco Use    Smoking status: Never Smoker    Smokeless tobacco: Never Used   Substance and Sexual Activity    Alcohol use: No    Drug use: No    Sexual activity: Yes     Partners: Male     Birth control/protection: None   Other Topics Concern    Not on file   Social History Narrative    Not on file       Current Medications  Medications reviewed and updated.     Allergies   Review of patient's allergies indicates:  No Known Allergies    Review of Systems (Pertinent positives)  Review of Systems   Constitutional: Negative for unexpected weight change.   HENT: Negative for trouble swallowing.    Eyes: Negative for visual disturbance.   Respiratory: Negative for cough.    Cardiovascular: Negative for leg swelling.   Gastrointestinal: Negative for blood in stool, constipation and diarrhea.   Genitourinary: Positive for menstrual problem (mild irregularity) and vaginal discharge. Negative for dysuria and hematuria.   Musculoskeletal: Negative for back pain.   Neurological: Negative for dizziness, syncope and light-headedness.  "  Psychiatric/Behavioral: Negative for sleep disturbance.       /71 (BP Location: Left arm, Patient Position: Sitting)   Pulse 70   Ht 5' 1" (1.549 m)   Wt 62.7 kg (138 lb 3.7 oz)   LMP 08/12/2018 (Approximate)   SpO2 99%   BMI 26.12 kg/m²     Physical Exam   Constitutional: She is oriented to person, place, and time. She appears well-developed and well-nourished. No distress.   HENT:   Head: Normocephalic and atraumatic.   Right Ear: Ear canal normal. Tympanic membrane is not erythematous and not bulging.   Left Ear: Ear canal normal. Tympanic membrane is not erythematous and not bulging.   Mouth/Throat: No oropharyngeal exudate.   Eyes: Conjunctivae are normal.   Neck: Carotid bruit is not present. No thyroid mass and no thyromegaly present.   Cardiovascular: Normal rate, regular rhythm and normal heart sounds.   No murmur heard.  Pulses:       Dorsalis pedis pulses are 2+ on the right side, and 2+ on the left side.   Pulmonary/Chest: Breath sounds normal. No respiratory distress. Right breast exhibits no mass. Left breast exhibits no mass.   Abdominal: Soft. Bowel sounds are normal. She exhibits no distension and no mass. There is no hepatosplenomegaly. There is no tenderness.   Genitourinary: Uterus normal. Pelvic exam was performed with patient supine. There is no rash or lesion on the right labia. There is no rash or lesion on the left labia. Cervix exhibits no motion tenderness and no discharge. Right adnexum displays no mass and no tenderness. Left adnexum displays no mass and no tenderness. Vaginal discharge found.   Musculoskeletal: Normal range of motion.   Lymphadenopathy:     She has no cervical adenopathy.   Neurological: She is alert and oriented to person, place, and time.   Skin: No rash noted.   Psychiatric: She has a normal mood and affect.   Vitals reviewed.        Assessment/Plan:  Daya Limon is a 38 y.o. female who presents today for :    Daya was seen today for annual exam and " gynecologic exam.    Diagnoses and all orders for this visit:    Encounter for routine gynecological examination with Papanicolaou smear of cervix  Comments:  Health maintenance reviewed, Pap today. Labs recently ordered/reviewed.  Flu shot today.  Advised on healthy diet, regular exercise, eye and dental exams  Orders:  -     Liquid-based pap smear, screening  -     HPV High Risk Genotypes, PCR  -     VAGINOSIS SCREEN BY DNA PROBE  -     C. trachomatis/N. gonorrhoeae by AMP DNA    Body mass index (BMI) 24.0-24.9, adult    Gastroesophageal reflux disease, esophagitis presence not specified    Vitamin D deficiency    Needs flu shot  -     Influenza - Quadrivalent (3 years & older) (PF)    Acute vaginitis    Screening examination for STD (sexually transmitted disease)  -     VAGINOSIS SCREEN BY DNA PROBE  -     C. trachomatis/N. gonorrhoeae by AMP DNA    Medication management    BMI 26.0-26.9,adult    Cold sore  -     valACYclovir (VALTREX) 1000 MG tablet; Take 1 tablet (1,000 mg total) by mouth 2 (two) times daily. for 5 days    Other orders  -     Cancel: Liquid-based pap smear, screening  -     Cancel: HPV High Risk Genotypes, PCR            ICD-10-CM ICD-9-CM    1. Encounter for routine gynecological examination with Papanicolaou smear of cervix Z01.419 V72.31 Liquid-based pap smear, screening     V76.2 HPV High Risk Genotypes, PCR      VAGINOSIS SCREEN BY DNA PROBE      C. trachomatis/N. gonorrhoeae by AMP DNA    Health maintenance reviewed, Pap today. Labs recently ordered/reviewed.  Flu shot today.  Advised on healthy diet, regular exercise, eye and dental exams   2. Body mass index (BMI) 24.0-24.9, adult Z68.24 V85.1    3. Gastroesophageal reflux disease, esophagitis presence not specified K21.9 530.81    4. Vitamin D deficiency E55.9 268.9    5. Needs flu shot Z23 V04.81 Influenza - Quadrivalent (3 years & older) (PF)   6. Acute vaginitis N76.0 616.10    7. Screening examination for STD (sexually transmitted  disease) Z11.3 V74.5 VAGINOSIS SCREEN BY DNA PROBE      C. trachomatis/N. gonorrhoeae by AMP DNA   8. Medication management Z79.899 V58.69    9. BMI 26.0-26.9,adult Z68.26 V85.22    10. Cold sore B00.1 054.9 valACYclovir (VALTREX) 1000 MG tablet       There are no Patient Instructions on file for this visit.      Follow-up in about 1 year (around 9/18/2019) for return as needed for new concerns.

## 2018-09-20 LAB
C TRACH DNA SPEC QL NAA+PROBE: NOT DETECTED
N GONORRHOEA DNA SPEC QL NAA+PROBE: NOT DETECTED

## 2018-09-21 LAB
HPV HR 12 DNA CVX QL NAA+PROBE: NEGATIVE
HPV16 AG SPEC QL: NEGATIVE
HPV18 DNA SPEC QL NAA+PROBE: NEGATIVE

## 2018-10-08 ENCOUNTER — OFFICE VISIT (OUTPATIENT)
Dept: OPTOMETRY | Facility: CLINIC | Age: 38
End: 2018-10-08
Payer: COMMERCIAL

## 2018-10-08 DIAGNOSIS — H52.203 HYPEROPIC ASTIGMATISM, BILATERAL: ICD-10-CM

## 2018-10-08 DIAGNOSIS — H02.88B MEIBOMIAN GLAND DYSFUNCTION (MGD), BILATERAL, BOTH UPPER AND LOWER LIDS: ICD-10-CM

## 2018-10-08 DIAGNOSIS — H02.88A MEIBOMIAN GLAND DYSFUNCTION (MGD), BILATERAL, BOTH UPPER AND LOWER LIDS: ICD-10-CM

## 2018-10-08 DIAGNOSIS — R51.9 HEADACHE ABOVE THE EYE REGION: Primary | ICD-10-CM

## 2018-10-08 PROCEDURE — 92014 COMPRE OPH EXAM EST PT 1/>: CPT | Mod: S$GLB,,, | Performed by: OPTOMETRIST

## 2018-10-08 PROCEDURE — 92015 DETERMINE REFRACTIVE STATE: CPT | Mod: S$GLB,,, | Performed by: OPTOMETRIST

## 2018-10-08 PROCEDURE — 99999 PR PBB SHADOW E&M-EST. PATIENT-LVL II: CPT | Mod: PBBFAC,,, | Performed by: OPTOMETRIST

## 2019-05-13 ENCOUNTER — TELEPHONE (OUTPATIENT)
Dept: FAMILY MEDICINE | Facility: CLINIC | Age: 39
End: 2019-05-13

## 2019-05-13 NOTE — TELEPHONE ENCOUNTER
----- Message from Peyton Fitzgerald sent at 5/13/2019 12:51 PM CDT -----  Contact: Self [phone]  Patient is calling to be seen this week for severe acid reflux and a cough. Please advice

## 2019-05-15 ENCOUNTER — LAB VISIT (OUTPATIENT)
Dept: LAB | Facility: HOSPITAL | Age: 39
End: 2019-05-15
Attending: FAMILY MEDICINE
Payer: COMMERCIAL

## 2019-05-15 ENCOUNTER — OFFICE VISIT (OUTPATIENT)
Dept: FAMILY MEDICINE | Facility: CLINIC | Age: 39
End: 2019-05-15
Payer: COMMERCIAL

## 2019-05-15 VITALS
WEIGHT: 136.88 LBS | HEART RATE: 66 BPM | SYSTOLIC BLOOD PRESSURE: 100 MMHG | HEIGHT: 61 IN | BODY MASS INDEX: 25.84 KG/M2 | OXYGEN SATURATION: 99 % | DIASTOLIC BLOOD PRESSURE: 60 MMHG | TEMPERATURE: 99 F

## 2019-05-15 DIAGNOSIS — J30.89 NON-SEASONAL ALLERGIC RHINITIS, UNSPECIFIED TRIGGER: ICD-10-CM

## 2019-05-15 DIAGNOSIS — Z79.899 ENCOUNTER FOR MONITORING LONG-TERM PROTON PUMP INHIBITOR THERAPY: ICD-10-CM

## 2019-05-15 DIAGNOSIS — K21.9 GASTROESOPHAGEAL REFLUX DISEASE, ESOPHAGITIS PRESENCE NOT SPECIFIED: Primary | ICD-10-CM

## 2019-05-15 DIAGNOSIS — R05.9 COUGH: ICD-10-CM

## 2019-05-15 DIAGNOSIS — Z51.81 ENCOUNTER FOR MONITORING LONG-TERM PROTON PUMP INHIBITOR THERAPY: ICD-10-CM

## 2019-05-15 DIAGNOSIS — R10.13 EPIGASTRIC ABDOMINAL PAIN: ICD-10-CM

## 2019-05-15 DIAGNOSIS — K21.9 GASTROESOPHAGEAL REFLUX DISEASE, ESOPHAGITIS PRESENCE NOT SPECIFIED: ICD-10-CM

## 2019-05-15 LAB
ALBUMIN SERPL BCP-MCNC: 4.1 G/DL (ref 3.5–5.2)
ALP SERPL-CCNC: 71 U/L (ref 55–135)
ALT SERPL W/O P-5'-P-CCNC: 33 U/L (ref 10–44)
ANION GAP SERPL CALC-SCNC: 5 MMOL/L (ref 8–16)
AST SERPL-CCNC: 30 U/L (ref 10–40)
BASOPHILS # BLD AUTO: 0.02 K/UL (ref 0–0.2)
BASOPHILS NFR BLD: 0.3 % (ref 0–1.9)
BILIRUB SERPL-MCNC: 0.3 MG/DL (ref 0.1–1)
BUN SERPL-MCNC: 9 MG/DL (ref 6–20)
CALCIUM SERPL-MCNC: 9.7 MG/DL (ref 8.7–10.5)
CHLORIDE SERPL-SCNC: 109 MMOL/L (ref 95–110)
CO2 SERPL-SCNC: 26 MMOL/L (ref 23–29)
CREAT SERPL-MCNC: 0.8 MG/DL (ref 0.5–1.4)
DIFFERENTIAL METHOD: NORMAL
EOSINOPHIL # BLD AUTO: 0.3 K/UL (ref 0–0.5)
EOSINOPHIL NFR BLD: 4.9 % (ref 0–8)
ERYTHROCYTE [DISTWIDTH] IN BLOOD BY AUTOMATED COUNT: 13.5 % (ref 11.5–14.5)
EST. GFR  (AFRICAN AMERICAN): >60 ML/MIN/1.73 M^2
EST. GFR  (NON AFRICAN AMERICAN): >60 ML/MIN/1.73 M^2
GLUCOSE SERPL-MCNC: 81 MG/DL (ref 70–110)
HCT VFR BLD AUTO: 41.9 % (ref 37–48.5)
HGB BLD-MCNC: 13.7 G/DL (ref 12–16)
LIPASE SERPL-CCNC: 24 U/L (ref 4–60)
LYMPHOCYTES # BLD AUTO: 1.9 K/UL (ref 1–4.8)
LYMPHOCYTES NFR BLD: 27.4 % (ref 18–48)
MAGNESIUM SERPL-MCNC: 2.2 MG/DL (ref 1.6–2.6)
MCH RBC QN AUTO: 28 PG (ref 27–31)
MCHC RBC AUTO-ENTMCNC: 32.7 G/DL (ref 32–36)
MCV RBC AUTO: 86 FL (ref 82–98)
MONOCYTES # BLD AUTO: 0.5 K/UL (ref 0.3–1)
MONOCYTES NFR BLD: 7 % (ref 4–15)
NEUTROPHILS # BLD AUTO: 4.2 K/UL (ref 1.8–7.7)
NEUTROPHILS NFR BLD: 60.3 % (ref 38–73)
PLATELET # BLD AUTO: 245 K/UL (ref 150–350)
PMV BLD AUTO: 11.3 FL (ref 9.2–12.9)
POTASSIUM SERPL-SCNC: 3.9 MMOL/L (ref 3.5–5.1)
PROT SERPL-MCNC: 7.6 G/DL (ref 6–8.4)
RBC # BLD AUTO: 4.9 M/UL (ref 4–5.4)
SODIUM SERPL-SCNC: 140 MMOL/L (ref 136–145)
VIT B12 SERPL-MCNC: 633 PG/ML (ref 210–950)
WBC # BLD AUTO: 6.89 K/UL (ref 3.9–12.7)

## 2019-05-15 PROCEDURE — 3008F PR BODY MASS INDEX (BMI) DOCUMENTED: ICD-10-PCS | Mod: CPTII,S$GLB,, | Performed by: FAMILY MEDICINE

## 2019-05-15 PROCEDURE — 99999 PR PBB SHADOW E&M-EST. PATIENT-LVL III: CPT | Mod: PBBFAC,,, | Performed by: FAMILY MEDICINE

## 2019-05-15 PROCEDURE — 86677 HELICOBACTER PYLORI ANTIBODY: CPT

## 2019-05-15 PROCEDURE — 85025 COMPLETE CBC W/AUTO DIFF WBC: CPT

## 2019-05-15 PROCEDURE — 80053 COMPREHEN METABOLIC PANEL: CPT

## 2019-05-15 PROCEDURE — 99214 OFFICE O/P EST MOD 30 MIN: CPT | Mod: S$GLB,,, | Performed by: FAMILY MEDICINE

## 2019-05-15 PROCEDURE — 82607 VITAMIN B-12: CPT

## 2019-05-15 PROCEDURE — 83735 ASSAY OF MAGNESIUM: CPT

## 2019-05-15 PROCEDURE — 82306 VITAMIN D 25 HYDROXY: CPT

## 2019-05-15 PROCEDURE — 36415 COLL VENOUS BLD VENIPUNCTURE: CPT

## 2019-05-15 PROCEDURE — 83690 ASSAY OF LIPASE: CPT

## 2019-05-15 PROCEDURE — 99999 PR PBB SHADOW E&M-EST. PATIENT-LVL III: ICD-10-PCS | Mod: PBBFAC,,, | Performed by: FAMILY MEDICINE

## 2019-05-15 PROCEDURE — 99214 PR OFFICE/OUTPT VISIT, EST, LEVL IV, 30-39 MIN: ICD-10-PCS | Mod: S$GLB,,, | Performed by: FAMILY MEDICINE

## 2019-05-15 PROCEDURE — 3008F BODY MASS INDEX DOCD: CPT | Mod: CPTII,S$GLB,, | Performed by: FAMILY MEDICINE

## 2019-05-15 RX ORDER — ESOMEPRAZOLE MAGNESIUM 40 MG/1
40 CAPSULE, DELAYED RELEASE ORAL
Qty: 60 CAPSULE | Refills: 3 | Status: SHIPPED | OUTPATIENT
Start: 2019-05-15 | End: 2020-03-03 | Stop reason: SDUPTHER

## 2019-05-15 RX ORDER — CETIRIZINE HYDROCHLORIDE 10 MG/1
10 TABLET ORAL DAILY
Refills: 0 | COMMUNITY
Start: 2019-05-15 | End: 2020-03-03 | Stop reason: ALTCHOICE

## 2019-05-15 NOTE — PROGRESS NOTES
Office Visit    Patient Name: Daya Limon    : 1980  MRN: [id number]    Subjective:  Daya is a 39 y.o. female who presents today for:    Cough and Gastroesophageal Reflux    39-year-old patient of mine seen by me for annual exam 2018(advised 1 yr f/u, sooner if concerns), who is overall healthy and with labs at time of annual exam remarkable only for low vitamin-D, here today for urgent care visit to discuss recent cough and exacerbation of GERD symptoms.    She was previously stable on nexium and was doing so well that she stopped it 2 months ago and unfortunately started to experience worsening GERD symptoms.  She is experiencing worsening epigastric pain that at times radiates to the back, acid reflux into her chest associated with significant cough.  She at times feels like her chest is tight but  her exercise tolerance is overall stable.  Her bowel habits are overall unremarkable, was having some constipation but this has resolved, not having blood in her stool or dark stools.  Urination habits are overall normal.  She is noticing what feels like some postnasal drip with globus sensation in her throat accompanied by frequent throat clearing.    She has stopped caffeinated beverages-- coffee and tea as of about 2 weeks ago, avoiding fried foods, does eat spicy foods, avoiding carbonated beverages except for occasional coke.     Past Medical History  Past Medical History:   Diagnosis Date    GERD (gastroesophageal reflux disease) 2017    Vitamin D deficiency 2017       Past Surgical History  Past Surgical History:   Procedure Laterality Date    VAGINAL DELIVERY      X2       Family History  Family History   Problem Relation Age of Onset    Diabetes Mother     Diabetes Father     Kidney disease Father     Kidney failure Father     Cataracts Father     No Known Problems Sister     No Known Problems Brother     No Known Problems Maternal Aunt     No Known Problems Maternal Uncle      No Known Problems Paternal Aunt     No Known Problems Paternal Uncle     No Known Problems Maternal Grandmother     No Known Problems Maternal Grandfather     No Known Problems Paternal Grandmother     No Known Problems Paternal Grandfather     Breast cancer Neg Hx     Colon cancer Neg Hx     Ovarian cancer Neg Hx     Hypertension Neg Hx     Stroke Neg Hx     Amblyopia Neg Hx     Blindness Neg Hx     Cancer Neg Hx     Glaucoma Neg Hx     Macular degeneration Neg Hx     Retinal detachment Neg Hx     Strabismus Neg Hx     Thyroid disease Neg Hx        Social History  Social History     Socioeconomic History    Marital status:      Spouse name: Not on file    Number of children: 2    Years of education: Not on file    Highest education level: Not on file   Occupational History    Not on file   Social Needs    Financial resource strain: Not on file    Food insecurity:     Worry: Not on file     Inability: Not on file    Transportation needs:     Medical: Not on file     Non-medical: Not on file   Tobacco Use    Smoking status: Never Smoker    Smokeless tobacco: Never Used   Substance and Sexual Activity    Alcohol use: No    Drug use: No    Sexual activity: Yes     Partners: Male     Birth control/protection: None   Lifestyle    Physical activity:     Days per week: Not on file     Minutes per session: Not on file    Stress: Not on file   Relationships    Social connections:     Talks on phone: Not on file     Gets together: Not on file     Attends L[employer] service: Not on file     Active member of club or organization: Not on file     Attends meetings of clubs or organizations: Not on file     Relationship status: Not on file   Other Topics Concern    Not on file   Social History Narrative    Not on file       Current Medications  Medications reviewed and updated.     Allergies   Review of patient's allergies indicates:  No Known Allergies    Review of Systems (Pertinent  "positives)  Review of Systems    /60 (BP Location: Left arm, Patient Position: Sitting, BP Method: Medium (Manual))   Pulse 66   Temp 98.5 °F (36.9 °C) (Oral)   Ht 5' 1" (1.549 m)   Wt 62.1 kg (136 lb 14.5 oz)   SpO2 99%   BMI 25.87 kg/m²     Physical Exam   Constitutional: She is oriented to person, place, and time. She appears well-developed and well-nourished. No distress.   HENT:   Head: Normocephalic and atraumatic.   Nose: No mucosal edema or rhinorrhea.   Mouth/Throat: Posterior oropharyngeal erythema (mild) present. No oropharyngeal exudate or posterior oropharyngeal edema.   Eyes: Conjunctivae are normal.   Cardiovascular: Normal rate and regular rhythm.   Pulmonary/Chest: Effort normal and breath sounds normal.   Abdominal: Soft. Bowel sounds are normal. She exhibits no distension and no mass. There is tenderness in the epigastric area. There is no rebound and no guarding.   Musculoskeletal: She exhibits no edema.   Neurological: She is alert and oriented to person, place, and time.   Skin: Skin is warm and dry.   Psychiatric: She has a normal mood and affect.   Vitals reviewed.        Assessment/Plan:  Daya Limon is a 39 y.o. female who presents today for :    Daya was seen today for cough and gastroesophageal reflux.    Diagnoses and all orders for this visit:    Gastroesophageal reflux disease, esophagitis presence not specified  -     H. PYLORI ANTIBODY, IGG; Future  -     Comprehensive metabolic panel; Future  -     Bifidobacterium infantis (ALIGN) 4 mg Cap; Take 1 capsule (4 mg total) by mouth daily with breakfast.  -     esomeprazole (NEXIUM) 40 MG capsule; Take 1 capsule (40 mg total) by mouth 2 (two) times daily before meals.  -     cetirizine (ZYRTEC) 10 MG tablet; Take 1 tablet (10 mg total) by mouth once daily.    Encounter for monitoring long-term proton pump inhibitor therapy  -     Comprehensive metabolic panel; Future  -     Vitamin D; Future  -     Vitamin B12; Future  -     " Magnesium; Future    Cough    Epigastric abdominal pain  -     H. PYLORI ANTIBODY, IGG; Future  -     Lipase; Future  -     Comprehensive metabolic panel; Future  -     CBC auto differential; Future  -     Bifidobacterium infantis (ALIGN) 4 mg Cap; Take 1 capsule (4 mg total) by mouth daily with breakfast.  -     esomeprazole (NEXIUM) 40 MG capsule; Take 1 capsule (40 mg total) by mouth 2 (two) times daily before meals.    Non-seasonal allergic rhinitis, unspecified trigger  -     cetirizine (ZYRTEC) 10 MG tablet; Take 1 tablet (10 mg total) by mouth once daily.            ICD-10-CM ICD-9-CM    1. Gastroesophageal reflux disease, esophagitis presence not specified K21.9 530.81 H. PYLORI ANTIBODY, IGG      Comprehensive metabolic panel      Bifidobacterium infantis (ALIGN) 4 mg Cap      esomeprazole (NEXIUM) 40 MG capsule      cetirizine (ZYRTEC) 10 MG tablet   2. Encounter for monitoring long-term proton pump inhibitor therapy Z51.81 V58.83 Comprehensive metabolic panel    Z79.899 V58.69 Vitamin D      Vitamin B12      Magnesium   3. Cough R05 786.2    4. Epigastric abdominal pain R10.13 789.06 H. PYLORI ANTIBODY, IGG      Lipase      Comprehensive metabolic panel      CBC auto differential      Bifidobacterium infantis (ALIGN) 4 mg Cap      esomeprazole (NEXIUM) 40 MG capsule   5. Non-seasonal allergic rhinitis, unspecified trigger J30.89 477.8 cetirizine (ZYRTEC) 10 MG tablet       Patient Instructions   While waiting for the increased nexium to take effect, ok to USE over the counter GAVISCON liquid as needed,.     If no significant improvement in 2-3 weeks, will advise GI referral to discuss EGD (upper scope) for further evaluation.        Follow up in about 3 weeks (around 6/5/2019) for return as needed for new concerns, to follow up on lab results.

## 2019-05-15 NOTE — PATIENT INSTRUCTIONS
While waiting for the increased nexium to take effect, ok to USE over the counter GAVISCON liquid as needed,.     If no significant improvement in 2-3 weeks, will advise GI referral to discuss EGD (upper scope) for further evaluation.

## 2019-05-16 LAB — 25(OH)D3+25(OH)D2 SERPL-MCNC: 18 NG/ML (ref 30–96)

## 2019-05-18 ENCOUNTER — TELEPHONE (OUTPATIENT)
Dept: FAMILY MEDICINE | Facility: CLINIC | Age: 39
End: 2019-05-18

## 2019-05-18 DIAGNOSIS — R10.13 DYSPEPSIA: Primary | ICD-10-CM

## 2019-05-18 LAB — H PYLORI IGG SERPL QL IA: ABNORMAL

## 2019-05-20 ENCOUNTER — PATIENT MESSAGE (OUTPATIENT)
Dept: FAMILY MEDICINE | Facility: CLINIC | Age: 39
End: 2019-05-20

## 2019-05-28 ENCOUNTER — PATIENT MESSAGE (OUTPATIENT)
Dept: FAMILY MEDICINE | Facility: CLINIC | Age: 39
End: 2019-05-28

## 2019-05-28 ENCOUNTER — LAB VISIT (OUTPATIENT)
Dept: LAB | Facility: HOSPITAL | Age: 39
End: 2019-05-28
Attending: FAMILY MEDICINE
Payer: COMMERCIAL

## 2019-05-28 DIAGNOSIS — R10.13 DYSPEPSIA: ICD-10-CM

## 2019-05-28 PROCEDURE — 87338 HPYLORI STOOL AG IA: CPT

## 2019-06-03 LAB — H PYLORI AG STL QL IA: NOT DETECTED

## 2019-06-03 NOTE — PROGRESS NOTES
Office Visit    Patient Name: Daya Limon    : 1980  MRN: [id number]    Subjective:  Daya is a 39 y.o. female who presents today for:    Follow-up    39-year-old patient of mine seen by me for annual exam 2018(advised 1 yr f/u, sooner if concerns) WHO FOLLOWED UP WITH ME 5.15.19 to discuss exacerbation of GERD.  She is overall healthy with medical issues that include only GERD and vit D deficiency.      She was previously stable on nexium and was doing so well that she stopped it about 2 months prior to her recent appointment 5/15/19 but then unfortunately started to experience worsening GERD symptoms including worsening epigastric pain and cough with throat clearing. She had stopped caffeinated beverages-- coffee and tea, was avoiding fried foods( does eat spicy foods), and avoiding carbonated beverages except for occasional coke. She has resumed OTC PPI but it was not helping much.    AT the time of her recent visit I advised on continued dietary modifications, STARTING NEXIUM 40 MG BID, STARTING ALIGN PROBIOTIC, AND PRN GAVISCON. She had labs drawn 5/15/19 remarkable for equivocal H Pylori test so stool test was ordered and is negative. Vitamin D low at 18 and she was advise to increase OTC vit D to 5,000 IU daily. Otherwise labs including CBC, CMP, Lipase unremarkable/WNL. B12/MAgnesium WNL- checked secondary to PPI use.    In the last 2-3 weeks she reports feeling much better. She is taking the Nexium 40 BID as directed, ALIGN-- not taking, GAVISCON-- not taking as the higher dose of nexium started working. Taking Zyrtec 10 mg daily for allergies/ post nasal drip and throat clearing.      Epigastric pain is gone, radiating burning pain in chest and back is resolved. Still with mild constipation. Still with some ongoing cough and throat clearing. No dysphagia.     Past Medical History  Past Medical History:   Diagnosis Date    GERD (gastroesophageal reflux disease) 2017    Vitamin D  deficiency 4/16/2017       Past Surgical History  Past Surgical History:   Procedure Laterality Date    VAGINAL DELIVERY      X2       Family History  Family History   Problem Relation Age of Onset    Diabetes Mother     Diabetes Father     Kidney disease Father     Kidney failure Father     Cataracts Father     No Known Problems Sister     No Known Problems Brother     No Known Problems Maternal Aunt     No Known Problems Maternal Uncle     No Known Problems Paternal Aunt     No Known Problems Paternal Uncle     No Known Problems Maternal Grandmother     No Known Problems Maternal Grandfather     No Known Problems Paternal Grandmother     No Known Problems Paternal Grandfather     Breast cancer Neg Hx     Colon cancer Neg Hx     Ovarian cancer Neg Hx     Hypertension Neg Hx     Stroke Neg Hx     Amblyopia Neg Hx     Blindness Neg Hx     Cancer Neg Hx     Glaucoma Neg Hx     Macular degeneration Neg Hx     Retinal detachment Neg Hx     Strabismus Neg Hx     Thyroid disease Neg Hx        Social History  Social History     Socioeconomic History    Marital status:      Spouse name: Not on file    Number of children: 2    Years of education: Not on file    Highest education level: Not on file   Occupational History    Not on file   Social Needs    Financial resource strain: Not on file    Food insecurity:     Worry: Not on file     Inability: Not on file    Transportation needs:     Medical: Not on file     Non-medical: Not on file   Tobacco Use    Smoking status: Never Smoker    Smokeless tobacco: Never Used   Substance and Sexual Activity    Alcohol use: No    Drug use: No    Sexual activity: Yes     Partners: Male     Birth control/protection: None   Lifestyle    Physical activity:     Days per week: Not on file     Minutes per session: Not on file    Stress: Not on file   Relationships    Social connections:     Talks on phone: Not on file     Gets together: Not on  "file     Attends Sikhism service: Not on file     Active member of club or organization: Not on file     Attends meetings of clubs or organizations: Not on file     Relationship status: Not on file   Other Topics Concern    Not on file   Social History Narrative    Not on file       Current Medications  Medications reviewed and updated.     Allergies   Review of patient's allergies indicates:  No Known Allergies    Review of Systems (Pertinent positives)  Review of Systems   HENT: Positive for postnasal drip. Negative for trouble swallowing.    Cardiovascular: Negative for chest pain.   Gastrointestinal: Positive for constipation (mild). Negative for abdominal pain.       /66   Pulse 81   Temp 97.7 °F (36.5 °C)   Ht 5' 1" (1.549 m)   Wt 62.2 kg (137 lb 2 oz)   SpO2 98%   BMI 25.91 kg/m²     Physical Exam   Constitutional: She is oriented to person, place, and time. She appears well-developed and well-nourished. No distress.   HENT:   Head: Normocephalic and atraumatic.   Eyes: Conjunctivae are normal.   Cardiovascular: Normal rate and regular rhythm.   Pulmonary/Chest: Effort normal and breath sounds normal.   Abdominal: Soft. Bowel sounds are normal. She exhibits no distension. There is no tenderness.   Musculoskeletal: She exhibits no edema.   Neurological: She is alert and oriented to person, place, and time.   Skin: Skin is warm and dry.   Psychiatric: She has a normal mood and affect.   Vitals reviewed.        Assessment/Plan:  Daya Limon is a 39 y.o. female who presents today for :    Daya was seen today for follow-up.    Diagnoses and all orders for this visit:    Gastroesophageal reflux disease, esophagitis presence not specified  Comments:  Symptoms now significantly improved on b.i.d. PPI and dietary changes.  Advised on wean down protocol for proton pump inhibitor.  Proton pump labs checked    Encounter for monitoring long-term proton pump inhibitor therapy  Comments:  Vitamin-D level is " low and she will start taking her 5000 IU supplement every day    Vitamin D deficiency  -     cholecalciferol, vitamin D3, (VITAMIN D3) 1,000 unit capsule; Take 5 capsules (5,000 Units total) by mouth once daily.    Helicobacter pylori antibody above reference range  Comments:  STOOL TEST NEGATIVE    Medication management    Throat clearing  Comments:  Likely a combination of allergies with postnasal drip and GERD symptoms.  Advised follow-up with ENT for nasopharyngoscopy if symptoms persist. continue ZYRTEC            ICD-10-CM ICD-9-CM    1. Gastroesophageal reflux disease, esophagitis presence not specified K21.9 530.81     Symptoms now significantly improved on b.i.d. PPI and dietary changes.  Advised on wean down protocol for proton pump inhibitor.  Proton pump labs checked   2. Encounter for monitoring long-term proton pump inhibitor therapy Z51.81 V58.83     Z79.899 V58.69     Vitamin-D level is low and she will start taking her 5000 IU supplement every day   3. Vitamin D deficiency E55.9 268.9 cholecalciferol, vitamin D3, (VITAMIN D3) 1,000 unit capsule   4. Helicobacter pylori antibody above reference range R76.8 795.79     STOOL TEST NEGATIVE   5. Medication management Z79.899 V58.69    6. Throat clearing R68.89 784.99     Likely a combination of allergies with postnasal drip and GERD symptoms.  Advised follow-up with ENT for nasopharyngoscopy if symptoms persist. continue ZYRTEC       Patient Instructions   ADVISE CONTINUING NEXIUM 40 MG TWICE DAILY PRIOR TO MEALS FOR 4 MORE WEEKS THEN WEAN DOWN TO NEXIUM 40 MG ONCE DAILY PRIOR TO A MEAL FOR 8 MORE WEEKS THEN IF DOING WELL COULD DROP DOWN TO NEXIUM OF THE COUNTER (20 MG DAILY PRIOR TO A MEAL)- TO CONTINUE UNTIL NEXT PHYSICAL.     CAUTION WITH REFLUX INDUCING FOODS- SPICY FOOD, CARBONATED BEVERAGES, COFFEE/ TEA, RED SAUCE, FRIED FOOD ETC.     CONSIDER REFERRAL TO ENT IF SYMPTOMS OF LARYNGOPHARYNGEAL SYMPTOMS PERSIST.     CONTINUE ZYRTEC.         No  follow-ups on file.

## 2019-06-04 ENCOUNTER — OFFICE VISIT (OUTPATIENT)
Dept: FAMILY MEDICINE | Facility: CLINIC | Age: 39
End: 2019-06-04
Payer: COMMERCIAL

## 2019-06-04 VITALS
SYSTOLIC BLOOD PRESSURE: 112 MMHG | HEIGHT: 61 IN | HEART RATE: 81 BPM | BODY MASS INDEX: 25.89 KG/M2 | OXYGEN SATURATION: 98 % | TEMPERATURE: 98 F | DIASTOLIC BLOOD PRESSURE: 66 MMHG | WEIGHT: 137.13 LBS

## 2019-06-04 DIAGNOSIS — Z79.899 MEDICATION MANAGEMENT: ICD-10-CM

## 2019-06-04 DIAGNOSIS — R09.89 THROAT CLEARING: ICD-10-CM

## 2019-06-04 DIAGNOSIS — K21.9 GASTROESOPHAGEAL REFLUX DISEASE, ESOPHAGITIS PRESENCE NOT SPECIFIED: Primary | ICD-10-CM

## 2019-06-04 DIAGNOSIS — R76.8 HELICOBACTER PYLORI ANTIBODY ABOVE REFERENCE RANGE: ICD-10-CM

## 2019-06-04 DIAGNOSIS — Z51.81 ENCOUNTER FOR MONITORING LONG-TERM PROTON PUMP INHIBITOR THERAPY: ICD-10-CM

## 2019-06-04 DIAGNOSIS — Z79.899 ENCOUNTER FOR MONITORING LONG-TERM PROTON PUMP INHIBITOR THERAPY: ICD-10-CM

## 2019-06-04 DIAGNOSIS — E55.9 VITAMIN D DEFICIENCY: ICD-10-CM

## 2019-06-04 PROCEDURE — 99999 PR PBB SHADOW E&M-EST. PATIENT-LVL III: ICD-10-PCS | Mod: PBBFAC,,, | Performed by: FAMILY MEDICINE

## 2019-06-04 PROCEDURE — 3008F BODY MASS INDEX DOCD: CPT | Mod: CPTII,S$GLB,, | Performed by: FAMILY MEDICINE

## 2019-06-04 PROCEDURE — 3008F PR BODY MASS INDEX (BMI) DOCUMENTED: ICD-10-PCS | Mod: CPTII,S$GLB,, | Performed by: FAMILY MEDICINE

## 2019-06-04 PROCEDURE — 99214 OFFICE O/P EST MOD 30 MIN: CPT | Mod: S$GLB,,, | Performed by: FAMILY MEDICINE

## 2019-06-04 PROCEDURE — 99214 PR OFFICE/OUTPT VISIT, EST, LEVL IV, 30-39 MIN: ICD-10-PCS | Mod: S$GLB,,, | Performed by: FAMILY MEDICINE

## 2019-06-04 PROCEDURE — 99999 PR PBB SHADOW E&M-EST. PATIENT-LVL III: CPT | Mod: PBBFAC,,, | Performed by: FAMILY MEDICINE

## 2019-06-04 RX ORDER — VIT C/E/ZN/COPPR/LUTEIN/ZEAXAN 250MG-90MG
5000 CAPSULE ORAL DAILY
Refills: 0 | COMMUNITY
Start: 2019-06-04 | End: 2021-11-29

## 2019-10-11 ENCOUNTER — OFFICE VISIT (OUTPATIENT)
Dept: URGENT CARE | Facility: CLINIC | Age: 39
End: 2019-10-11
Payer: COMMERCIAL

## 2019-10-11 VITALS
SYSTOLIC BLOOD PRESSURE: 112 MMHG | HEIGHT: 61 IN | WEIGHT: 137 LBS | HEART RATE: 78 BPM | BODY MASS INDEX: 25.86 KG/M2 | OXYGEN SATURATION: 98 % | RESPIRATION RATE: 18 BRPM | TEMPERATURE: 99 F | DIASTOLIC BLOOD PRESSURE: 77 MMHG

## 2019-10-11 DIAGNOSIS — J84.9 INTERSTITIAL PNEUMONIA: Primary | ICD-10-CM

## 2019-10-11 DIAGNOSIS — M79.2 RADICULAR PAIN IN LEFT ARM: ICD-10-CM

## 2019-10-11 DIAGNOSIS — R05.9 COUGH: ICD-10-CM

## 2019-10-11 DIAGNOSIS — M54.50 ACUTE BILATERAL LOW BACK PAIN WITHOUT SCIATICA: ICD-10-CM

## 2019-10-11 LAB
BILIRUB UR QL STRIP: NEGATIVE
GLUCOSE UR QL STRIP: NEGATIVE
KETONES UR QL STRIP: NEGATIVE
LEUKOCYTE ESTERASE UR QL STRIP: NEGATIVE
PH, POC UA: 6 (ref 5–8)
POC BLOOD, URINE: POSITIVE
POC NITRATES, URINE: NEGATIVE
PROT UR QL STRIP: NEGATIVE
SP GR UR STRIP: 1.01 (ref 1–1.03)
UROBILINOGEN UR STRIP-ACNC: NORMAL (ref 0.1–1.1)

## 2019-10-11 PROCEDURE — 99214 OFFICE O/P EST MOD 30 MIN: CPT | Mod: 25,S$GLB,, | Performed by: PHYSICIAN ASSISTANT

## 2019-10-11 PROCEDURE — 81003 POCT URINALYSIS, DIPSTICK, AUTOMATED, W/O SCOPE: ICD-10-PCS | Mod: QW,S$GLB,, | Performed by: PHYSICIAN ASSISTANT

## 2019-10-11 PROCEDURE — 81003 URINALYSIS AUTO W/O SCOPE: CPT | Mod: QW,S$GLB,, | Performed by: PHYSICIAN ASSISTANT

## 2019-10-11 PROCEDURE — 71046 XR CHEST PA AND LATERAL: ICD-10-PCS | Mod: FY,S$GLB,, | Performed by: RADIOLOGY

## 2019-10-11 PROCEDURE — 99214 PR OFFICE/OUTPT VISIT, EST, LEVL IV, 30-39 MIN: ICD-10-PCS | Mod: 25,S$GLB,, | Performed by: PHYSICIAN ASSISTANT

## 2019-10-11 PROCEDURE — 71046 X-RAY EXAM CHEST 2 VIEWS: CPT | Mod: FY,S$GLB,, | Performed by: RADIOLOGY

## 2019-10-11 PROCEDURE — 3008F BODY MASS INDEX DOCD: CPT | Mod: CPTII,S$GLB,, | Performed by: PHYSICIAN ASSISTANT

## 2019-10-11 PROCEDURE — 3008F PR BODY MASS INDEX (BMI) DOCUMENTED: ICD-10-PCS | Mod: CPTII,S$GLB,, | Performed by: PHYSICIAN ASSISTANT

## 2019-10-11 RX ORDER — AMOXICILLIN 875 MG/1
875 TABLET, FILM COATED ORAL 2 TIMES DAILY
Qty: 20 TABLET | Refills: 0 | Status: SHIPPED | OUTPATIENT
Start: 2019-10-11 | End: 2019-10-21

## 2019-10-11 RX ORDER — IBUPROFEN 600 MG/1
600 TABLET ORAL EVERY 6 HOURS PRN
Qty: 30 TABLET | Refills: 0 | Status: SHIPPED | OUTPATIENT
Start: 2019-10-11 | End: 2020-10-10

## 2019-10-11 NOTE — PROGRESS NOTES
"Subjective:       Patient ID: Daya Limon is a 39 y.o. female.    Vitals:  height is 5' 1" (1.549 m) and weight is 62.1 kg (137 lb). Her temperature is 98.7 °F (37.1 °C). Her blood pressure is 112/77 and her pulse is 78. Her respiration is 18 and oxygen saturation is 98%.     Chief Complaint: Cough    She is a 39-year-old female who presents with a chief complaint of a cough times 10 days that has progressively worsened with productive sputum.  She denies any other symptoms of URI related symptoms.  She denies chest pain or exertional shortness of breath although when she lays down at night to sleep she does feel short of breath.  She also reports 3 days of aching left shoulder pain. She states it radiates down her arm.  She denies any sharp pain.  She denies any numbness tingling or weakness of her left arm.  She denies any pain or trauma to her neck or left arm.  She states last week she also had some mild low back pain.  The stretcher caused her back.  Associated without was frequency and hesitancy.  She states she has not noticed that as much because of the left arm pain    Cough   This is a new problem. The current episode started 1 to 4 weeks ago (10 days ). The problem has been unchanged. The problem occurs constantly. The cough is productive of sputum. Associated symptoms include shortness of breath (when laying down). Pertinent negatives include no chest pain, chills, ear congestion, ear pain, eye redness, fever, headaches, heartburn, hemoptysis, myalgias, nasal congestion, postnasal drip, rash, rhinorrhea, sore throat, sweats, weight loss or wheezing. The symptoms are aggravated by lying down. She has tried a beta-agonist inhaler for the symptoms. The treatment provided moderate relief.   Arm Pain    The incident occurred 3 to 5 days ago. There was no injury mechanism. The pain is present in the left forearm, left shoulder, left wrist and left hand. The quality of the pain is described as aching. The pain " radiates to the left arm. The pain is at a severity of 7/10. The pain is moderate. The pain has been constant since the incident. Pertinent negatives include no chest pain, muscle weakness (when in pain feels like she cannot use her arm as much), numbness or tingling. Nothing aggravates the symptoms. She has tried nothing for the symptoms.       Constitution: Negative for chills, sweating, fatigue and fever.   HENT: Negative for ear pain, congestion, postnasal drip, sinus pain, sinus pressure, sore throat and voice change.    Neck: Negative for neck pain, neck stiffness, painful lymph nodes and neck swelling.   Cardiovascular: Negative for chest pain, leg swelling and palpitations.   Eyes: Negative for eye redness.   Respiratory: Positive for cough and shortness of breath (when laying down). Negative for chest tightness, sputum production, bloody sputum, COPD, stridor, wheezing and asthma.    Gastrointestinal: Negative for nausea, vomiting and heartburn.   Musculoskeletal: Negative for muscle ache.   Skin: Negative for rash.   Allergic/Immunologic: Negative for seasonal allergies and asthma.   Neurological: Negative for headaches and numbness.   Hematologic/Lymphatic: Negative for swollen lymph nodes.       Objective:      Physical Exam   Constitutional: She is oriented to person, place, and time. Vital signs are normal. She appears well-developed and well-nourished. She is active and cooperative.  Non-toxic appearance. She does not have a sickly appearance. She does not appear ill. No distress.   HENT:   Head: Normocephalic and atraumatic. Head is without abrasion, without contusion and without laceration.   Right Ear: Hearing, tympanic membrane, external ear and ear canal normal. No hemotympanum.   Left Ear: Hearing, tympanic membrane, external ear and ear canal normal. No hemotympanum.   Nose: Nose normal. No mucosal edema, rhinorrhea or nasal deformity. No epistaxis. Right sinus exhibits no maxillary sinus  tenderness and no frontal sinus tenderness. Left sinus exhibits no maxillary sinus tenderness and no frontal sinus tenderness.   Mouth/Throat: Uvula is midline, oropharynx is clear and moist and mucous membranes are normal. No trismus in the jaw. Normal dentition. No uvula swelling. No oropharyngeal exudate, posterior oropharyngeal edema or posterior oropharyngeal erythema.   Eyes: Pupils are equal, round, and reactive to light. Conjunctivae, EOM and lids are normal. Right eye exhibits no discharge. Left eye exhibits no discharge. No scleral icterus.   Neck: Trachea normal, normal range of motion, full passive range of motion without pain and phonation normal. Neck supple. No spinous process tenderness and no muscular tenderness present. No neck rigidity. No tracheal deviation, no edema and no erythema present.   Cardiovascular: Normal rate, regular rhythm, normal heart sounds, intact distal pulses and normal pulses.   Pulmonary/Chest: Effort normal. No accessory muscle usage or stridor. No respiratory distress. She has no decreased breath sounds. She has no wheezes. She has no rhonchi. She has no rales. Chest wall is not dull to percussion. She exhibits no tenderness and no bony tenderness.       Abdominal: Soft. Normal appearance and bowel sounds are normal. She exhibits no distension, no abdominal bruit, no pulsatile midline mass and no mass. There is no tenderness.   Musculoskeletal: Normal range of motion. She exhibits no edema or deformity.        Right shoulder: She exhibits normal range of motion, no tenderness, no bony tenderness, no swelling, no effusion, no crepitus, no deformity, no laceration, no pain, no spasm, normal pulse and normal strength.        Left shoulder: She exhibits normal range of motion, no tenderness, no bony tenderness, no swelling, no effusion, no crepitus, no deformity, no laceration, no pain, no spasm, normal pulse and normal strength.        Right elbow: She exhibits normal range  of motion, no swelling, no effusion, no deformity and no laceration. No tenderness found. No radial head, no medial epicondyle, no lateral epicondyle and no olecranon process tenderness noted.        Left elbow: She exhibits normal range of motion, no swelling, no effusion, no deformity and no laceration. No tenderness found. No radial head, no medial epicondyle, no lateral epicondyle and no olecranon process tenderness noted.        Right wrist: She exhibits normal range of motion, no tenderness, no bony tenderness, no swelling, no effusion, no crepitus, no deformity and no laceration.        Left wrist: She exhibits normal range of motion, no tenderness, no bony tenderness, no swelling, no effusion, no crepitus, no deformity and no laceration.        Cervical back: She exhibits normal range of motion, no tenderness, no bony tenderness, no swelling, no edema, no deformity, no laceration, no pain, no spasm and normal pulse.        Thoracic back: She exhibits normal range of motion, no tenderness, no bony tenderness, no swelling, no edema, no deformity, no laceration, no pain, no spasm and normal pulse.        Lumbar back: She exhibits normal range of motion, no tenderness, no bony tenderness, no swelling, no edema, no deformity, no laceration, no pain, no spasm and normal pulse.        Right upper arm: She exhibits no tenderness, no bony tenderness, no swelling, no edema, no deformity and no laceration.        Left upper arm: She exhibits no tenderness, no bony tenderness, no swelling, no edema, no deformity and no laceration.        Right forearm: She exhibits no tenderness, no bony tenderness, no swelling, no edema, no deformity and no laceration.        Left forearm: She exhibits no tenderness, no bony tenderness, no swelling, no edema, no deformity and no laceration.        Right hand: She exhibits normal range of motion, no tenderness, no bony tenderness, normal two-point discrimination, normal capillary  refill, no deformity, no laceration and no swelling. Normal sensation noted. Decreased sensation is not present in the ulnar distribution, is not present in the medial distribution and is not present in the radial distribution. Normal strength noted. She exhibits no finger abduction, no thumb/finger opposition and no wrist extension trouble.        Left hand: She exhibits normal range of motion, no tenderness, no bony tenderness, normal two-point discrimination, normal capillary refill, no deformity, no laceration and no swelling. Normal sensation noted. Decreased sensation is not present in the ulnar distribution, is not present in the medial redistribution and is not present in the radial distribution. Normal strength noted. She exhibits no finger abduction, no thumb/finger opposition and no wrist extension trouble.   Neurological: She is alert and oriented to person, place, and time. She has normal strength and normal reflexes. No cranial nerve deficit or sensory deficit. She exhibits normal muscle tone. She displays no seizure activity. Coordination normal. GCS eye subscore is 4. GCS verbal subscore is 5. GCS motor subscore is 6.   Skin: Skin is warm, dry, intact, not diaphoretic and not pale. Capillary refill takes less than 2 seconds. abrasion, burn, bruising and ecchymosis  Psychiatric: She has a normal mood and affect. Her speech is normal and behavior is normal. Judgment and thought content normal. Cognition and memory are normal.   Nursing note and vitals reviewed.          XRAY: Bilateral diffuse mild nonspecific interstitial coarsening which can be seen with small vessels disease including pulmonary edema, small airways disease or interstitial pneumonia.  No focal consolidation.    Assessment:       1. Interstitial pneumonia    2. Cough    3. Acute bilateral low back pain without sciatica    4. Radicular pain in left arm        Plan:         Interstitial pneumonia  -     amoxicillin (AMOXIL) 875 MG  tablet; Take 1 tablet (875 mg total) by mouth 2 (two) times daily. for 10 days  Dispense: 20 tablet; Refill: 0    Cough  -     X-Ray Chest PA And Lateral; Future; Expected date: 10/11/2019    Acute bilateral low back pain without sciatica  -     POCT Urinalysis, Dipstick, Automated, W/O Scope  -     Ambulatory referral/consult to Orthopedics    Radicular pain in left arm  -     ibuprofen (ADVIL,MOTRIN) 600 MG tablet; Take 1 tablet (600 mg total) by mouth every 6 (six) hours as needed.  Dispense: 30 tablet; Refill: 0  -     Ambulatory referral/consult to Orthopedics         Patient Instructions   -Below are suggestions for symptomatic relief:              -Tylenol every 4 hours OR ibuprofen every 6 hours as needed for pain/fever.              -Salt water gargles to soothe throat pain.              -Chloroseptic spray also helps to numb throat pain.              -Nasal saline spray reduces inflammation and dryness.              -Warm face compresses to help with facial sinus pain/pressure.              -Vicks vapor rub at night.              -Flonase OTC or Nasacort OTC for nasal congestion.              -Simple foods like chicken noodle soup.              -Delsym helps with coughing at night              -Zyrtec/Claritin during the day & Benadryl at night may help with allergies.                If you DO NOT have Hypertension or any history of palpitations, it is ok to take over the counter Sudafed or Mucinex D or Allegra-D or Claritin-D or Zyrtec-D.  If you do take one of the above, it is ok to combine that with plain over the counter Mucinex or Allegra or Claritin or Zyrtec. If, for example, you are taking Zyrtec -D, you can combine that with Mucinex, but not Mucinex-D.  If you are taking Mucinex-D, you can combine that with plain Allegra or Claritin or Zyrtec.   If you DO have Hypertension or palpitations, it is safe to take Coricidin HBP for relief of sinus symptoms.    Discussed rest ice and heat modalities for  left upper extremity. I discussed to follow up with Ortho.  We will begin with conservative treatment.    Reviewed radiographs.  Based on patient's symptomatology for 10 days and worsening we will treat for interstitial pneumonia.  Patient will follow up with her primary care physician.    Discussed normal urinalysis besides hematuria.  Patient just started her cycle.    Please follow up with your Primary care provider within 2-5 days if your signs and symptoms have not resolved or worsen.     If your condition worsens or fails to improve we recommend that you receive another evaluation at the emergency room immediately or contact your primary medical clinic to discuss your concerns.   You must understand that you have received an Urgent Care treatment only and that you may be released before all of your medical problems are known or treated. You, the patient, will arrange for follow up care as instructed.     RED FLAGS/WARNING SYMPTOMS DISCUSSED WITH PATIENT THAT WOULD WARRANT EMERGENT MEDICAL ATTENTION. PATIENT VERBALIZED UNDERSTANDING.

## 2019-10-11 NOTE — PATIENT INSTRUCTIONS
Patient Instructions   -Below are suggestions for symptomatic relief:              -Tylenol every 4 hours OR ibuprofen every 6 hours as needed for pain/fever.              -Salt water gargles to soothe throat pain.              -Chloroseptic spray also helps to numb throat pain.              -Nasal saline spray reduces inflammation and dryness.              -Warm face compresses to help with facial sinus pain/pressure.              -Vicks vapor rub at night.              -Flonase OTC or Nasacort OTC for nasal congestion.              -Simple foods like chicken noodle soup.              -Delsym helps with coughing at night              -Zyrtec/Claritin during the day & Benadryl at night may help with allergies.                If you DO NOT have Hypertension or any history of palpitations, it is ok to take over the counter Sudafed or Mucinex D or Allegra-D or Claritin-D or Zyrtec-D.  If you do take one of the above, it is ok to combine that with plain over the counter Mucinex or Allegra or Claritin or Zyrtec. If, for example, you are taking Zyrtec -D, you can combine that with Mucinex, but not Mucinex-D.  If you are taking Mucinex-D, you can combine that with plain Allegra or Claritin or Zyrtec.   If you DO have Hypertension or palpitations, it is safe to take Coricidin HBP for relief of sinus symptoms.    Discussed rest ice and heat modalities for left upper extremity.  I discussed to follow up with Ortho.  We will begin with conservative treatment.    Reviewed radiographs.  Based on patient's symptomatology for 10 days and worsening we will treat for interstitial pneumonia.  Patient will follow up with her primary care physician.    Discussed normal urinalysis besides hematuria.  Patient just started her cycle.    Please follow up with your Primary care provider within 2-5 days if your signs and symptoms have not resolved or worsen.     If your condition worsens or fails to improve we recommend that you receive  another evaluation at the emergency room immediately or contact your primary medical clinic to discuss your concerns.   You must understand that you have received an Urgent Care treatment only and that you may be released before all of your medical problems are known or treated. You, the patient, will arrange for follow up care as instructed.     RED FLAGS/WARNING SYMPTOMS DISCUSSED WITH PATIENT THAT WOULD WARRANT EMERGENT MEDICAL ATTENTION. PATIENT VERBALIZED UNDERSTANDING.

## 2019-10-14 ENCOUNTER — TELEPHONE (OUTPATIENT)
Dept: URGENT CARE | Facility: CLINIC | Age: 39
End: 2019-10-14

## 2019-10-18 ENCOUNTER — OFFICE VISIT (OUTPATIENT)
Dept: FAMILY MEDICINE | Facility: CLINIC | Age: 39
End: 2019-10-18
Payer: COMMERCIAL

## 2019-10-18 ENCOUNTER — HOSPITAL ENCOUNTER (OUTPATIENT)
Dept: RADIOLOGY | Facility: HOSPITAL | Age: 39
Discharge: HOME | End: 2019-10-18
Attending: FAMILY MEDICINE
Payer: COMMERCIAL

## 2019-10-18 VITALS
BODY MASS INDEX: 25.76 KG/M2 | OXYGEN SATURATION: 98 % | DIASTOLIC BLOOD PRESSURE: 84 MMHG | SYSTOLIC BLOOD PRESSURE: 122 MMHG | TEMPERATURE: 98 F | HEIGHT: 61 IN | HEART RATE: 85 BPM | WEIGHT: 136.44 LBS

## 2019-10-18 DIAGNOSIS — Z51.81 ENCOUNTER FOR MONITORING LONG-TERM PROTON PUMP INHIBITOR THERAPY: ICD-10-CM

## 2019-10-18 DIAGNOSIS — Z23 NEEDS FLU SHOT: ICD-10-CM

## 2019-10-18 DIAGNOSIS — J84.9 INTERSTITIAL PNEUMONIA: ICD-10-CM

## 2019-10-18 DIAGNOSIS — R10.9 FLANK PAIN: ICD-10-CM

## 2019-10-18 DIAGNOSIS — Z00.00 ROUTINE GENERAL MEDICAL EXAMINATION AT A HEALTH CARE FACILITY: Primary | ICD-10-CM

## 2019-10-18 DIAGNOSIS — K21.9 GASTROESOPHAGEAL REFLUX DISEASE, ESOPHAGITIS PRESENCE NOT SPECIFIED: ICD-10-CM

## 2019-10-18 DIAGNOSIS — Z79.899 ENCOUNTER FOR MONITORING LONG-TERM PROTON PUMP INHIBITOR THERAPY: ICD-10-CM

## 2019-10-18 DIAGNOSIS — E04.1 RIGHT THYROID NODULE: ICD-10-CM

## 2019-10-18 DIAGNOSIS — R05.8 POST-VIRAL COUGH SYNDROME: ICD-10-CM

## 2019-10-18 DIAGNOSIS — E55.9 VITAMIN D DEFICIENCY: ICD-10-CM

## 2019-10-18 PROCEDURE — 71046 XR CHEST PA AND LATERAL: ICD-10-PCS | Mod: 26,,, | Performed by: RADIOLOGY

## 2019-10-18 PROCEDURE — 99395 PREV VISIT EST AGE 18-39: CPT | Mod: 25,S$GLB,, | Performed by: FAMILY MEDICINE

## 2019-10-18 PROCEDURE — 90686 IIV4 VACC NO PRSV 0.5 ML IM: CPT | Mod: S$GLB,,, | Performed by: FAMILY MEDICINE

## 2019-10-18 PROCEDURE — 71046 X-RAY EXAM CHEST 2 VIEWS: CPT | Mod: TC,FY

## 2019-10-18 PROCEDURE — 90471 IMMUNIZATION ADMIN: CPT | Mod: S$GLB,,, | Performed by: FAMILY MEDICINE

## 2019-10-18 PROCEDURE — 90686 FLU VACCINE (QUAD) GREATER THAN OR EQUAL TO 3YO PRESERVATIVE FREE IM: ICD-10-PCS | Mod: S$GLB,,, | Performed by: FAMILY MEDICINE

## 2019-10-18 PROCEDURE — 99999 PR PBB SHADOW E&M-EST. PATIENT-LVL IV: ICD-10-PCS | Mod: PBBFAC,,, | Performed by: FAMILY MEDICINE

## 2019-10-18 PROCEDURE — 71046 X-RAY EXAM CHEST 2 VIEWS: CPT | Mod: 26,,, | Performed by: RADIOLOGY

## 2019-10-18 PROCEDURE — 99395 PR PREVENTIVE VISIT,EST,18-39: ICD-10-PCS | Mod: 25,S$GLB,, | Performed by: FAMILY MEDICINE

## 2019-10-18 PROCEDURE — 99999 PR PBB SHADOW E&M-EST. PATIENT-LVL IV: CPT | Mod: PBBFAC,,, | Performed by: FAMILY MEDICINE

## 2019-10-18 PROCEDURE — 90471 FLU VACCINE (QUAD) GREATER THAN OR EQUAL TO 3YO PRESERVATIVE FREE IM: ICD-10-PCS | Mod: S$GLB,,, | Performed by: FAMILY MEDICINE

## 2019-10-18 RX ORDER — CODEINE PHOSPHATE AND GUAIFENESIN 10; 100 MG/5ML; MG/5ML
SOLUTION ORAL
Qty: 236 ML | Refills: 0 | Status: SHIPPED | OUTPATIENT
Start: 2019-10-18 | End: 2020-03-03

## 2019-10-18 NOTE — PATIENT INSTRUCTIONS
"HAS BEEN HAVING A CHRONIC COUGH THAT WAS ACUTELY RECENTLY WORSENED WITH ILLNESS. DOING MUCH BETTER SINCE STEROIDS AND WILL REPEAT CHEST XRAY TO ENSURE THAT PREVIOUSLY VISUALIZED INFLAMMATION IS GONE.     IF COUGH PERSISTS AND CHEST XRAY IS NORMAL-- 2 MOST LIKELY CAUSES WOULD BE 1) ALLERGIES AND 2) "SILENT REFLUX" THAT CAN BE EVALUATED BY AN ENT WITH NASOPHARYNGOSCOPY (CAMERA VISUALIZATION IN THE OFFICE THROUGH NOSE)  "

## 2019-10-18 NOTE — PROGRESS NOTES
Office Visit    Patient Name: Daya Limon    : 1980  MRN: [id number]    Subjective:  Daya is a 39 y.o. female who presents today for:    Annual Exam    Daya Limon presents today for annual wellness exam.  She was recently treated in Urgent Care 10/11/2019 for Interstitial Pneumonia-- took 20 mg prednisone for 5 days and her symptoms improved significantly other than a nighttime cough. She did not take the amoxicillin antibiotic prescribed.     She is of child bearing age.  Menstrual cycles regular and not overly heavy.  She has 3 children and is hoping to conceive a 4th.  She is partially here today to ensure that she is healthy prior to attempting pregnancy.     She has been feeling overall well. Still continuing to have some heart burn but resolves with nexium and notes spicy foods are a trigger-- doing well off of Nexium over the last 3 weeks, but she does struggle with a chronic cough and it is unclear if this is potentially related to reflux.      General lifestyle habits are as follows:  Diet is described as healthy- somewhat carb heavy with rice but watches sugars, exercise is described as fair-- does some walking on her home treadmill but could do more and does not do any strength/stretching, sleep is described as good 7+hours nightly and generally no concerns except for recent nocturnal cough.  Weight is stable in the last year.      Immunizations: TDaP 2014, Influenza vaccine  10/18/2019     Screening Tests: PAP/ HPV normal/ negative 2018 and (-) STD screen- repeat 5 years, start mammograms next year at age 40     Eye/Dental: dental up to date this year, needs to schedule eye exam    Past Medical History  Past Medical History:   Diagnosis Date    Body mass index (BMI) 24.0-24.9, adult 2016    GERD (gastroesophageal reflux disease) 2017    Vitamin D deficiency 2017       Past Surgical History  Past Surgical History:   Procedure Laterality Date    VAGINAL DELIVERY      X2        Family History  Family History   Problem Relation Age of Onset    Diabetes Mother     Diabetes Father     Kidney disease Father     Kidney failure Father     Cataracts Father     No Known Problems Sister     No Known Problems Brother     No Known Problems Maternal Aunt     No Known Problems Maternal Uncle     No Known Problems Paternal Aunt     No Known Problems Paternal Uncle     No Known Problems Maternal Grandmother     No Known Problems Maternal Grandfather     No Known Problems Paternal Grandmother     No Known Problems Paternal Grandfather     Breast cancer Neg Hx     Colon cancer Neg Hx     Ovarian cancer Neg Hx     Hypertension Neg Hx     Stroke Neg Hx     Amblyopia Neg Hx     Blindness Neg Hx     Cancer Neg Hx     Glaucoma Neg Hx     Macular degeneration Neg Hx     Retinal detachment Neg Hx     Strabismus Neg Hx     Thyroid disease Neg Hx        Social History  Social History     Socioeconomic History    Marital status:      Spouse name: Not on file    Number of children: 2    Years of education: Not on file    Highest education level: Not on file   Occupational History    Not on file   Social Needs    Financial resource strain: Not on file    Food insecurity:     Worry: Not on file     Inability: Not on file    Transportation needs:     Medical: Not on file     Non-medical: Not on file   Tobacco Use    Smoking status: Never Smoker    Smokeless tobacco: Never Used   Substance and Sexual Activity    Alcohol use: No    Drug use: No    Sexual activity: Yes     Partners: Male     Birth control/protection: None   Lifestyle    Physical activity:     Days per week: Not on file     Minutes per session: Not on file    Stress: Not on file   Relationships    Social connections:     Talks on phone: Not on file     Gets together: Not on file     Attends Worship service: Not on file     Active member of club or organization: Not on file     Attends meetings of  "clubs or organizations: Not on file     Relationship status: Not on file   Other Topics Concern    Not on file   Social History Narrative    Not on file       Current Medications  Medications reviewed and updated.     Allergies   Review of patient's allergies indicates:  No Known Allergies    Review of Systems (Pertinent positives)  Review of Systems   Constitutional: Negative for fever and unexpected weight change.   HENT: Negative for dental problem (still has wisdom teeth).    Eyes: Negative for visual disturbance.   Respiratory: Positive for cough. Negative for shortness of breath.    Cardiovascular: Negative for chest pain and leg swelling.   Gastrointestinal: Negative for constipation and diarrhea.   Genitourinary: Positive for flank pain. Negative for dysuria (has had this some recently) and urgency.   Musculoskeletal: Positive for back pain.   Allergic/Immunologic: Negative for environmental allergies.   Neurological: Negative for dizziness.   Psychiatric/Behavioral: Negative for sleep disturbance.       /84 (BP Location: Right arm, Patient Position: Sitting, BP Method: Small (Manual))   Pulse 85   Temp 98.4 °F (36.9 °C) (Oral)   Ht 5' 1" (1.549 m)   Wt 61.9 kg (136 lb 7.4 oz)   LMP 10/10/2019   SpO2 98%   BMI 25.78 kg/m²     Physical Exam   Constitutional: She is oriented to person, place, and time. She appears well-developed and well-nourished. No distress.   HENT:   Head: Normocephalic and atraumatic.   Right Ear: Ear canal normal. Tympanic membrane is not erythematous and not bulging.   Left Ear: Ear canal normal. Tympanic membrane is not erythematous and not bulging.   Mouth/Throat: No oropharyngeal exudate.   Eyes: Conjunctivae are normal.   Neck: Carotid bruit is not present. No thyroid mass and no thyromegaly (possible right thyroid nodule) present.   Cardiovascular: Normal rate, regular rhythm and normal heart sounds.   No murmur heard.  Pulses:       Dorsalis pedis pulses are 2+ on " the right side, and 2+ on the left side.   Pulmonary/Chest: Effort normal and breath sounds normal. No respiratory distress. Right breast exhibits no mass. Left breast exhibits no mass.   Abdominal: Soft. Bowel sounds are normal. She exhibits no distension and no mass. There is no hepatosplenomegaly. There is no tenderness.   Musculoskeletal: Normal range of motion.   Lymphadenopathy:     She has no cervical adenopathy.   Neurological: She is alert and oriented to person, place, and time.   Skin: Skin is warm and dry. No rash noted.   Psychiatric: She has a normal mood and affect.   Vitals reviewed.        Assessment/Plan:  Daya Limon is a 39 y.o. female who presents today for :    Daya was seen today for annual exam.    Diagnoses and all orders for this visit:    Routine general medical examination at a health care facility  Comments:  HEALTH MAINTENANCE REVIEWED:  FLU SHOT TODAY, LABS ORDERED, TDAP AND PAP UP-TO-DATE.  ADVISED ON DIET/EXERCISE/SLEEP, EYE/DENTAL EXAMS  Orders:  -     Comprehensive metabolic panel; Future  -     Hemoglobin A1c; Future  -     Lipid panel; Future  -     CBC auto differential; Future  -     TSH; Future  -     Vitamin D; Future  -     Ferritin; Future  -     Vitamin B12; Future  -     Magnesium; Future  -     Iron and TIBC; Future  -     Urinalysis; Future  -     Influenza - Quadrivalent (6 months+) (PF)    BMI 25.0-25.9,adult    Gastroesophageal reflux disease, esophagitis presence not specified  Comments:  Reports doing well off of Nexium but she does continue to have a chronic cough, recently worsened with upper respiratory infection.    Encounter for monitoring long-term proton pump inhibitor therapy    Vitamin D deficiency  Comments:  Has been taking 5000 IU daily, check level with labs    Flank pain  Comments:  There was some blood noted in her urine when checked at urgent care but she was likely on her cycle, recheck urine with labs    Needs flu shot  -     Influenza -  Quadrivalent (6 months+) (PF)    Post-viral cough syndrome  -     guaifenesin-codeine 100-10 mg/5 ml (TUSSI-ORGANIDIN NR)  mg/5 mL syrup; 1-2 teaspoons nightly as needed for cough    Right thyroid nodule  -     US Soft Tissue Head Neck Thyroid; Future    Interstitial pneumonia  Comments:  Took steroids but not antibiotics &overall feeling better but still with significant nocturnal cough, repeat CXR to ensure improvement & advise based on results  Orders:  -     X-Ray Chest PA And Lateral; Future            ICD-10-CM ICD-9-CM    1. Routine general medical examination at a health care facility Z00.00 V70.0 Comprehensive metabolic panel      Hemoglobin A1c      Lipid panel      CBC auto differential      TSH      Vitamin D      Ferritin      Vitamin B12      Magnesium      Iron and TIBC      Urinalysis      Influenza - Quadrivalent (6 months+) (PF)    HEALTH MAINTENANCE REVIEWED:  FLU SHOT TODAY, LABS ORDERED, TDAP AND PAP UP-TO-DATE.  ADVISED ON DIET/EXERCISE/SLEEP, EYE/DENTAL EXAMS   2. BMI 25.0-25.9,adult Z68.25 V85.21    3. Gastroesophageal reflux disease, esophagitis presence not specified K21.9 530.81     Reports doing well off of Nexium but she does continue to have a chronic cough, recently worsened with upper respiratory infection.   4. Encounter for monitoring long-term proton pump inhibitor therapy Z51.81 V58.83     Z79.899 V58.69    5. Vitamin D deficiency E55.9 268.9     Has been taking 5000 IU daily, check level with labs   6. Flank pain R10.9 789.09     There was some blood noted in her urine when checked at urgent care but she was likely on her cycle, recheck urine with labs   7. Needs flu shot Z23 V04.81 Influenza - Quadrivalent (6 months+) (PF)   8. Post-viral cough syndrome R05 786.2 guaifenesin-codeine 100-10 mg/5 ml (TUSSI-ORGANIDIN NR)  mg/5 mL syrup   9. Right thyroid nodule E04.1 241.0 US Soft Tissue Head Neck Thyroid   10. Interstitial pneumonia J84.9 516.8 X-Ray Chest PA And  "Lateral    Took steroids but not antibiotics &overall feeling better but still with significant nocturnal cough, repeat CXR to ensure improvement & advise based on results       Patient Instructions   HAS BEEN HAVING A CHRONIC COUGH THAT WAS ACUTELY RECENTLY WORSENED WITH ILLNESS. DOING MUCH BETTER SINCE STEROIDS AND WILL REPEAT CHEST XRAY TO ENSURE THAT PREVIOUSLY VISUALIZED INFLAMMATION IS GONE.     IF COUGH PERSISTS AND CHEST XRAY IS NORMAL-- 2 MOST LIKELY CAUSES WOULD BE 1) ALLERGIES AND 2) "SILENT REFLUX" THAT CAN BE EVALUATED BY AN ENT WITH NASOPHARYNGOSCOPY (CAMERA VISUALIZATION IN THE OFFICE THROUGH NOSE)        Follow up for to follow up on lab results, to review results of diagnostic procedure.  "

## 2019-10-19 ENCOUNTER — HOSPITAL ENCOUNTER (OUTPATIENT)
Dept: RADIOLOGY | Facility: HOSPITAL | Age: 39
Discharge: HOME | End: 2019-10-19
Attending: FAMILY MEDICINE
Payer: COMMERCIAL

## 2019-10-19 DIAGNOSIS — E04.1 RIGHT THYROID NODULE: ICD-10-CM

## 2019-10-19 PROCEDURE — 76536 US SOFT TISSUE HEAD NECK THYROID: ICD-10-PCS | Mod: 26,,, | Performed by: RADIOLOGY

## 2019-10-19 PROCEDURE — 76536 US EXAM OF HEAD AND NECK: CPT | Mod: 26,,, | Performed by: RADIOLOGY

## 2019-10-19 PROCEDURE — 76536 US EXAM OF HEAD AND NECK: CPT | Mod: TC

## 2019-10-20 ENCOUNTER — TELEPHONE (OUTPATIENT)
Dept: FAMILY MEDICINE | Facility: CLINIC | Age: 39
End: 2019-10-20

## 2019-10-20 DIAGNOSIS — E04.1 RIGHT THYROID NODULE: ICD-10-CM

## 2019-10-21 ENCOUNTER — TELEPHONE (OUTPATIENT)
Dept: FAMILY MEDICINE | Facility: CLINIC | Age: 39
End: 2019-10-21

## 2019-10-21 ENCOUNTER — LAB VISIT (OUTPATIENT)
Dept: LAB | Facility: HOSPITAL | Age: 39
End: 2019-10-21
Attending: FAMILY MEDICINE
Payer: COMMERCIAL

## 2019-10-21 DIAGNOSIS — Z00.00 ROUTINE GENERAL MEDICAL EXAMINATION AT A HEALTH CARE FACILITY: ICD-10-CM

## 2019-10-21 LAB
25(OH)D3+25(OH)D2 SERPL-MCNC: 38 NG/ML (ref 30–96)
ALBUMIN SERPL BCP-MCNC: 4 G/DL (ref 3.5–5.2)
ALP SERPL-CCNC: 69 U/L (ref 55–135)
ALT SERPL W/O P-5'-P-CCNC: 28 U/L (ref 10–44)
ANION GAP SERPL CALC-SCNC: 9 MMOL/L (ref 8–16)
AST SERPL-CCNC: 23 U/L (ref 10–40)
BASOPHILS # BLD AUTO: 0.02 K/UL (ref 0–0.2)
BASOPHILS NFR BLD: 0.3 % (ref 0–1.9)
BILIRUB SERPL-MCNC: 0.7 MG/DL (ref 0.1–1)
BUN SERPL-MCNC: 10 MG/DL (ref 6–20)
CALCIUM SERPL-MCNC: 9.6 MG/DL (ref 8.7–10.5)
CHLORIDE SERPL-SCNC: 105 MMOL/L (ref 95–110)
CHOLEST SERPL-MCNC: 183 MG/DL (ref 120–199)
CHOLEST/HDLC SERPL: 3.2 {RATIO} (ref 2–5)
CO2 SERPL-SCNC: 26 MMOL/L (ref 23–29)
CREAT SERPL-MCNC: 0.7 MG/DL (ref 0.5–1.4)
DIFFERENTIAL METHOD: NORMAL
EOSINOPHIL # BLD AUTO: 0.3 K/UL (ref 0–0.5)
EOSINOPHIL NFR BLD: 3.8 % (ref 0–8)
ERYTHROCYTE [DISTWIDTH] IN BLOOD BY AUTOMATED COUNT: 14.1 % (ref 11.5–14.5)
EST. GFR  (AFRICAN AMERICAN): >60 ML/MIN/1.73 M^2
EST. GFR  (NON AFRICAN AMERICAN): >60 ML/MIN/1.73 M^2
ESTIMATED AVG GLUCOSE: 111 MG/DL (ref 68–131)
FERRITIN SERPL-MCNC: 38 NG/ML (ref 20–300)
GLUCOSE SERPL-MCNC: 81 MG/DL (ref 70–110)
HBA1C MFR BLD HPLC: 5.5 % (ref 4–5.6)
HCT VFR BLD AUTO: 41.1 % (ref 37–48.5)
HDLC SERPL-MCNC: 58 MG/DL (ref 40–75)
HDLC SERPL: 31.7 % (ref 20–50)
HGB BLD-MCNC: 13.3 G/DL (ref 12–16)
IRON SERPL-MCNC: 156 UG/DL (ref 30–160)
LDLC SERPL CALC-MCNC: 110.8 MG/DL (ref 63–159)
LYMPHOCYTES # BLD AUTO: 2.2 K/UL (ref 1–4.8)
LYMPHOCYTES NFR BLD: 33.4 % (ref 18–48)
MAGNESIUM SERPL-MCNC: 2.2 MG/DL (ref 1.6–2.6)
MCH RBC QN AUTO: 27.4 PG (ref 27–31)
MCHC RBC AUTO-ENTMCNC: 32.4 G/DL (ref 32–36)
MCV RBC AUTO: 85 FL (ref 82–98)
MONOCYTES # BLD AUTO: 0.4 K/UL (ref 0.3–1)
MONOCYTES NFR BLD: 6.1 % (ref 4–15)
NEUTROPHILS # BLD AUTO: 3.7 K/UL (ref 1.8–7.7)
NEUTROPHILS NFR BLD: 56.4 % (ref 38–73)
NONHDLC SERPL-MCNC: 125 MG/DL
PLATELET # BLD AUTO: 274 K/UL (ref 150–350)
PMV BLD AUTO: 11.2 FL (ref 9.2–12.9)
POTASSIUM SERPL-SCNC: 3.8 MMOL/L (ref 3.5–5.1)
PROT SERPL-MCNC: 7.4 G/DL (ref 6–8.4)
RBC # BLD AUTO: 4.86 M/UL (ref 4–5.4)
SATURATED IRON: 37 % (ref 20–50)
SODIUM SERPL-SCNC: 140 MMOL/L (ref 136–145)
TOTAL IRON BINDING CAPACITY: 417 UG/DL (ref 250–450)
TRANSFERRIN SERPL-MCNC: 282 MG/DL (ref 200–375)
TRIGL SERPL-MCNC: 71 MG/DL (ref 30–150)
TSH SERPL DL<=0.005 MIU/L-ACNC: 1.23 UIU/ML (ref 0.4–4)
VIT B12 SERPL-MCNC: 479 PG/ML (ref 210–950)
WBC # BLD AUTO: 6.55 K/UL (ref 3.9–12.7)

## 2019-10-21 PROCEDURE — 80053 COMPREHEN METABOLIC PANEL: CPT

## 2019-10-21 PROCEDURE — 83735 ASSAY OF MAGNESIUM: CPT

## 2019-10-21 PROCEDURE — 82607 VITAMIN B-12: CPT

## 2019-10-21 PROCEDURE — 82728 ASSAY OF FERRITIN: CPT

## 2019-10-21 PROCEDURE — 36415 COLL VENOUS BLD VENIPUNCTURE: CPT

## 2019-10-21 PROCEDURE — 83036 HEMOGLOBIN GLYCOSYLATED A1C: CPT

## 2019-10-21 PROCEDURE — 83540 ASSAY OF IRON: CPT

## 2019-10-21 PROCEDURE — 85025 COMPLETE CBC W/AUTO DIFF WBC: CPT

## 2019-10-21 PROCEDURE — 84443 ASSAY THYROID STIM HORMONE: CPT

## 2019-10-21 PROCEDURE — 82306 VITAMIN D 25 HYDROXY: CPT

## 2019-10-21 PROCEDURE — 80061 LIPID PANEL: CPT

## 2019-10-21 NOTE — TELEPHONE ENCOUNTER
Called pt let her know that someone will be contacting her to schedule her an appt with an endocrine specialist. Pt verbalized understanding.

## 2019-10-21 NOTE — TELEPHONE ENCOUNTER
----- Message from Esthela Abdi MD sent at 10/20/2019  3:55 PM CDT -----  Daya- your ultrasound confirms right sided thyroid nodules noted on your physical exam. I would like you discuss this further with a specialist as 1 of the nodules should be biopsied, dr Abdi  PS you will receive a phone call to schedule with the endocrine specialist to discuss this.

## 2019-10-27 ENCOUNTER — TELEPHONE (OUTPATIENT)
Dept: FAMILY MEDICINE | Facility: CLINIC | Age: 39
End: 2019-10-27

## 2019-10-27 DIAGNOSIS — R31.29 MICROSCOPIC HEMATURIA: Primary | ICD-10-CM

## 2019-10-28 ENCOUNTER — TELEPHONE (OUTPATIENT)
Dept: RHEUMATOLOGY | Facility: CLINIC | Age: 39
End: 2019-10-28

## 2019-10-28 ENCOUNTER — PATIENT MESSAGE (OUTPATIENT)
Dept: ENDOCRINOLOGY | Facility: CLINIC | Age: 39
End: 2019-10-28

## 2019-10-28 NOTE — TELEPHONE ENCOUNTER
I called pt again because she is 17 minutes late to her apt. The pt stated that she canceled the apt and does not want to reschedule at this moment.

## 2019-10-28 NOTE — TELEPHONE ENCOUNTER
Spoke to patient today around 10:30-10:40am to maker sure she was still coming in for apt. Pt responded yes.   Pt voices is understanding.

## 2019-10-29 ENCOUNTER — TELEPHONE (OUTPATIENT)
Dept: ENDOCRINOLOGY | Facility: CLINIC | Age: 39
End: 2019-10-29

## 2019-10-29 DIAGNOSIS — E04.1 RIGHT THYROID NODULE: Primary | ICD-10-CM

## 2019-10-29 NOTE — TELEPHONE ENCOUNTER
Spoke with pt. She's scheduled to see Dr. Costello for a consult and FNA TF on Mon, 11/4/19 @ 8A and 8:30 AM at [facility] in [facility]. The consult appt says 11a in the system but she will be seen at 8a for consult. Biopsy says 8A in the system but it's TF at 8:30 am. Pt states she will be a little late because she has to drop her kids off to school. Will mail appt slips with instructions on how to get to the clinic from the [facility].

## 2019-10-30 ENCOUNTER — PATIENT OUTREACH (OUTPATIENT)
Dept: ADMINISTRATIVE | Facility: OTHER | Age: 39
End: 2019-10-30

## 2019-11-04 ENCOUNTER — APPOINTMENT (OUTPATIENT)
Dept: LAB | Facility: OTHER | Age: 39
End: 2019-11-04
Attending: INTERNAL MEDICINE
Payer: COMMERCIAL

## 2019-11-04 ENCOUNTER — OFFICE VISIT (OUTPATIENT)
Dept: ENDOCRINOLOGY | Facility: CLINIC | Age: 39
End: 2019-11-04
Attending: INTERNAL MEDICINE
Payer: COMMERCIAL

## 2019-11-04 ENCOUNTER — LAB VISIT (OUTPATIENT)
Dept: LAB | Facility: HOSPITAL | Age: 39
End: 2019-11-04
Attending: FAMILY MEDICINE
Payer: COMMERCIAL

## 2019-11-04 VITALS
HEART RATE: 75 BPM | BODY MASS INDEX: 24.6 KG/M2 | HEIGHT: 61 IN | WEIGHT: 130.31 LBS | SYSTOLIC BLOOD PRESSURE: 125 MMHG | DIASTOLIC BLOOD PRESSURE: 64 MMHG

## 2019-11-04 DIAGNOSIS — R31.29 MICROSCOPIC HEMATURIA: ICD-10-CM

## 2019-11-04 DIAGNOSIS — E04.1 RIGHT THYROID NODULE: Primary | ICD-10-CM

## 2019-11-04 LAB
BILIRUB UR QL STRIP: NEGATIVE
CLARITY UR: CLEAR
COLOR UR: YELLOW
GLUCOSE UR QL STRIP: NEGATIVE
HGB UR QL STRIP: NEGATIVE
KETONES UR QL STRIP: NEGATIVE
LEUKOCYTE ESTERASE UR QL STRIP: NEGATIVE
NITRITE UR QL STRIP: NEGATIVE
PH UR STRIP: 8 [PH] (ref 5–8)
PROT UR QL STRIP: NEGATIVE
SP GR UR STRIP: 1.01 (ref 1–1.03)
URN SPEC COLLECT METH UR: NORMAL
UROBILINOGEN UR STRIP-ACNC: NEGATIVE EU/DL

## 2019-11-04 PROCEDURE — 99244 OFF/OP CNSLTJ NEW/EST MOD 40: CPT | Mod: S$GLB,,, | Performed by: INTERNAL MEDICINE

## 2019-11-04 PROCEDURE — 81003 URINALYSIS AUTO W/O SCOPE: CPT

## 2019-11-04 PROCEDURE — 99244 PR OFFICE CONSULTATION,LEVEL IV: ICD-10-PCS | Mod: S$GLB,,, | Performed by: INTERNAL MEDICINE

## 2019-11-04 NOTE — LETTER
November 4, 2019      Esthela Abdi MD  [address] [facility] ([place], [place]) [address]           [facility] [address], [address] [place] [id number]  Phone: [phone]  Fax: [phone]          Patient: Daya Limon   MR Number: [id number]   YOB: 1980   Date of Visit: 11/4/2019       Dear Dr. Esthela Abdi:    Thank you for referring Daya Limon to me for evaluation. Attached you will find relevant portions of my assessment and plan of care.    If you have questions, please do not hesitate to call me. I look forward to following Daya Limon along with you.    Sincerely,    Dennis Costello MD    Enclosure  CC:  No Recipients    If you would like to receive this communication electronically, please contact [email or web address] or [phone] to request more information on [employer] access.    For providers and/or their staff who would like to refer a patient to [facility], please contact us through our one-stop-shop provider referral line, [facility], at [phone].    If you feel you have received this communication in error or would no longer like to receive these types of communications, please e-mail [email or web address]

## 2019-11-04 NOTE — PROGRESS NOTES
Subjective:      Patient ID: Jd Limon is a 39 y.o. female.    Chief Complaint:  Thyroid Nodule    Referral from Dr. Abdi    History of Present Illness  Ms. Limon presents for evaluation and management of thyroid nodules.     Has active history of GERD.    First noted to have thyroid nodules on physical exam which was then confirmed on ultrasound.     Denies family history of thyroid cancer.   No personal history of head or neck irradiation.  Results for JD LIMON (MRN [id number]) as of 11/4/2019 07:19   Ref. Range 10/21/2019 11:03   TSH Latest Ref Range: 0.400 - 4.000 uIU/mL 1.228     No dysphagia, voice changes or hoarseness. No SOB.    Previous thyroid FNA results:  None    Most recent thyroid USS dated 10/19/2019:  The right and left lobes of the thyroid measures 5 x 1.8 x 2.1 cm and 3.7 x 1.2 x 1.7 cm respectively.  The left lobe has a heterogeneous echotexture.  There are 3 right-sided thyroid nodules the largest of which is located within the mid to lower pole measuring 2.4 x 1.3 x 1.7 cm and is a solid nodule which is mildly hyperechoic.  There are no enlarged cervical lymph nodes identified.    Review of Systems   Constitutional: Negative for unexpected weight change.   HENT: Negative for voice change.    Eyes: Negative for visual disturbance.   Respiratory: Negative for shortness of breath.    Cardiovascular: Negative for chest pain.   Gastrointestinal: Negative for abdominal pain.   Endocrine: Negative for cold intolerance.   Genitourinary: Negative for frequency.   Musculoskeletal: Negative for myalgias.   Skin: Negative for rash.       Objective:   Physical Exam   Constitutional: She is oriented to person, place, and time. She appears well-developed and well-nourished.   HENT:   Head: Normocephalic and atraumatic.   Right Ear: External ear normal.   Left Ear: External ear normal.   Nose: Nose normal.   Neck: No tracheal deviation present.   Right lobe thyroid nodule palpated   Cardiovascular:  Normal rate, regular rhythm and normal heart sounds.   No edema   Pulmonary/Chest: Effort normal and breath sounds normal.   Abdominal: Soft. Bowel sounds are normal. There is no tenderness.   Musculoskeletal:   Normal gait, no cyanosis or clubbing   Neurological: She is alert and oriented to person, place, and time. She has normal reflexes.   Vibration sense intact   Skin: Skin is warm and dry. No rash noted.   No nodules, no ulcers   Psychiatric: She has a normal mood and affect. Judgment normal.   Vitals reviewed.    BP Readings from Last 3 Encounters:   11/04/19 125/64   10/18/19 122/84   10/11/19 112/77     Wt Readings from Last 1 Encounters:   11/04/19 0848 59.1 kg (130 lb 4.7 oz)       Body mass index is 24.62 kg/m².    Lab Review:   Lab Results   Component Value Date    HGBA1C 5.5 10/21/2019     Lab Results   Component Value Date    CHOL 183 10/21/2019    HDL 58 10/21/2019    LDLCALC 110.8 10/21/2019    TRIG 71 10/21/2019    CHOLHDL 31.7 10/21/2019     Lab Results   Component Value Date     10/21/2019    K 3.8 10/21/2019     10/21/2019    CO2 26 10/21/2019    GLU 81 10/21/2019    BUN 10 10/21/2019    CREATININE 0.7 10/21/2019    CALCIUM 9.6 10/21/2019    PROT 7.4 10/21/2019    ALBUMIN 4.0 10/21/2019    BILITOT 0.7 10/21/2019    ALKPHOS 69 10/21/2019    AST 23 10/21/2019    ALT 28 10/21/2019    ANIONGAP 9 10/21/2019    ESTGFRAFRICA >60 10/21/2019    EGFRNONAA >60 10/21/2019    TSH 1.228 10/21/2019         Assessment and Plan     Right thyroid nodule  --Patient presenting with multiple thyroid nodules  --No risk factors for thyroid cancer.   --No compressive symptoms  --Recent TSH WNL  --Independently reviewed ultrasound images with the patient  --Has 2 solid nodules in the right lobe and one cystic nodule in the right/isthmus junction  --Solid 2.3 cm isoechoic right lobe nodule meets FNA criteria  --Reviewed FNA procedure in detail with the patient and possible cytologic outcomes including those  that could necessitate repeat FNA (I.e. FLUS or non-diagnostic)  --Will proceed with FNA of right lobe dominant nodule    Copy to Dr. Trinidad Costello M.D. Staff Endocrinology

## 2019-11-04 NOTE — ASSESSMENT & PLAN NOTE
--Patient presenting with multiple thyroid nodules  --No risk factors for thyroid cancer.   --No compressive symptoms  --Recent TSH WNL  --Independently reviewed ultrasound images with the patient  --Has 2 solid nodules in the right lobe and one cystic nodule in the right/isthmus junction  --Solid 2.3 cm isoechoic right lobe nodule meets FNA criteria  --Reviewed FNA procedure in detail with the patient and possible cytologic outcomes including those that could necessitate repeat FNA (I.e. FLUS or non-diagnostic)  --Will proceed with FNA of right lobe dominant nodule

## 2019-11-08 ENCOUNTER — TELEPHONE (OUTPATIENT)
Dept: ENDOCRINOLOGY | Facility: CLINIC | Age: 39
End: 2019-11-08

## 2019-11-08 DIAGNOSIS — E04.1 RIGHT THYROID NODULE: Primary | ICD-10-CM

## 2019-11-08 NOTE — TELEPHONE ENCOUNTER
Please advise patient that her thyroid biopsy came back benign or non-cancerous. I recommend she have a repeat ultrasound in one year and see me back at that time.

## 2019-11-08 NOTE — TELEPHONE ENCOUNTER
Left message with her son that Dr Costello reviewed her  thyroid biopsy came back benign or non-cancerous. Dr Costello recommend she have a repeat ultrasound in one year and see me back at that time.

## 2019-11-27 ENCOUNTER — PATIENT MESSAGE (OUTPATIENT)
Dept: FAMILY MEDICINE | Facility: CLINIC | Age: 39
End: 2019-11-27

## 2019-11-28 ENCOUNTER — TELEPHONE (OUTPATIENT)
Dept: FAMILY MEDICINE | Facility: CLINIC | Age: 39
End: 2019-11-28

## 2019-11-28 DIAGNOSIS — N92.6 MENSTRUAL PROBLEM: Primary | ICD-10-CM

## 2019-11-28 RX ORDER — NORETHINDRONE ACETATE AND ETHINYL ESTRADIOL .02; 1 MG/1; MG/1
1 TABLET ORAL DAILY
Qty: 90 TABLET | Refills: 1 | Status: SHIPPED | OUTPATIENT
Start: 2019-11-28 | End: 2020-03-03

## 2019-11-28 NOTE — TELEPHONE ENCOUNTER
Please notify patient that I have sent her pills called Microgestin.    She should start taking them now. She should continue the active pills and should NOT take the inactive placebo pills or else she will have her cycle when she is in [place]-- which she doesn't want as she would be prevented from participating in some of her [place] events.     Please ensure that she knows that there is never a guarantee with pills/ shots/ etc that she will not have bleeding, and she could have a problem with irregular bleeding at least temporarily, but this option generally works, is reasonable in terms of safety and is what I have done previously for women making a [place]anda[place] pilgrimage to [place].

## 2019-11-29 ENCOUNTER — PATIENT MESSAGE (OUTPATIENT)
Dept: FAMILY MEDICINE | Facility: CLINIC | Age: 39
End: 2019-11-29

## 2019-12-04 ENCOUNTER — TELEPHONE (OUTPATIENT)
Dept: FAMILY MEDICINE | Facility: CLINIC | Age: 39
End: 2019-12-04

## 2019-12-04 NOTE — TELEPHONE ENCOUNTER
----- Message from Francisco J Blevins sent at 12/4/2019 10:45 AM CST -----  Contact: Self  Pt needs someone to contact her in regards too her meds she need to take     Please advise pt can be reached at [phone]

## 2019-12-11 ENCOUNTER — PATIENT OUTREACH (OUTPATIENT)
Dept: ADMINISTRATIVE | Facility: OTHER | Age: 39
End: 2019-12-11

## 2019-12-12 ENCOUNTER — OFFICE VISIT (OUTPATIENT)
Dept: OPTOMETRY | Facility: CLINIC | Age: 39
End: 2019-12-12
Payer: COMMERCIAL

## 2019-12-12 DIAGNOSIS — H02.88A MEIBOMIAN GLAND DYSFUNCTION (MGD), BILATERAL, BOTH UPPER AND LOWER LIDS: ICD-10-CM

## 2019-12-12 DIAGNOSIS — R51.9 HEADACHE ABOVE THE EYE REGION: Primary | ICD-10-CM

## 2019-12-12 DIAGNOSIS — H02.88B MEIBOMIAN GLAND DYSFUNCTION (MGD), BILATERAL, BOTH UPPER AND LOWER LIDS: ICD-10-CM

## 2019-12-12 DIAGNOSIS — H52.203 HYPEROPIC ASTIGMATISM, BILATERAL: ICD-10-CM

## 2019-12-12 PROCEDURE — 92015 DETERMINE REFRACTIVE STATE: CPT | Mod: S$GLB,,, | Performed by: OPTOMETRIST

## 2019-12-12 PROCEDURE — 92014 COMPRE OPH EXAM EST PT 1/>: CPT | Mod: S$GLB,,, | Performed by: OPTOMETRIST

## 2019-12-12 PROCEDURE — 99999 PR PBB SHADOW E&M-EST. PATIENT-LVL II: CPT | Mod: PBBFAC,,, | Performed by: OPTOMETRIST

## 2019-12-12 PROCEDURE — 92014 PR EYE EXAM, EST PATIENT,COMPREHESV: ICD-10-PCS | Mod: S$GLB,,, | Performed by: OPTOMETRIST

## 2019-12-12 PROCEDURE — 92015 PR REFRACTION: ICD-10-PCS | Mod: S$GLB,,, | Performed by: OPTOMETRIST

## 2019-12-12 PROCEDURE — 99999 PR PBB SHADOW E&M-EST. PATIENT-LVL II: ICD-10-PCS | Mod: PBBFAC,,, | Performed by: OPTOMETRIST

## 2019-12-12 NOTE — PROGRESS NOTES
HPI     Annual eye exam   Blurry vision reading without wearing glasses  Dry eye, itch. Refresh PRN with relief     Last edited by Sid Morrow, OD on 12/12/2019 10:05 AM. (History)            Assessment /Plan     For exam results, see Encounter Report.    Headache above the eye region  -sRx for near work    Meibomian gland dysfunction (MGD), bilateral, both upper and lower lids  -Refresh PRN  -nightly lid scrubs    Hyperopic astigmatism, bilateral  Eyeglass Final Rx     Eyeglass Final Rx       Sphere Cylinder Axis Dist VA    Right +0.50 +0.75 105 20/20    Left +0.75 +0.75 070 20/20    Type:  SVL    Expiration Date:  12/12/2020              Optional change      RTC 1 yr

## 2019-12-20 DIAGNOSIS — J02.8 ACUTE BACTERIAL PHARYNGITIS: Primary | ICD-10-CM

## 2019-12-20 DIAGNOSIS — B96.89 ACUTE BACTERIAL PHARYNGITIS: Primary | ICD-10-CM

## 2019-12-20 RX ORDER — AZITHROMYCIN 250 MG/1
TABLET, FILM COATED ORAL
Qty: 6 TABLET | Refills: 0 | Status: SHIPPED | OUTPATIENT
Start: 2019-12-20 | End: 2019-12-25

## 2020-03-03 ENCOUNTER — OFFICE VISIT (OUTPATIENT)
Dept: FAMILY MEDICINE | Facility: CLINIC | Age: 40
End: 2020-03-03
Payer: COMMERCIAL

## 2020-03-03 VITALS
DIASTOLIC BLOOD PRESSURE: 62 MMHG | HEART RATE: 71 BPM | WEIGHT: 133.19 LBS | TEMPERATURE: 98 F | BODY MASS INDEX: 25.14 KG/M2 | OXYGEN SATURATION: 97 % | SYSTOLIC BLOOD PRESSURE: 110 MMHG | HEIGHT: 61 IN

## 2020-03-03 DIAGNOSIS — R68.89 FLU-LIKE SYMPTOMS: Primary | ICD-10-CM

## 2020-03-03 DIAGNOSIS — L82.1 SEBORRHEIC KERATOSIS: ICD-10-CM

## 2020-03-03 DIAGNOSIS — R09.82 POST-NASAL DRAINAGE: ICD-10-CM

## 2020-03-03 DIAGNOSIS — R05.3 CHRONIC COUGH: ICD-10-CM

## 2020-03-03 DIAGNOSIS — J30.89 NON-SEASONAL ALLERGIC RHINITIS, UNSPECIFIED TRIGGER: ICD-10-CM

## 2020-03-03 DIAGNOSIS — G89.29 CHRONIC BILATERAL THORACIC BACK PAIN: ICD-10-CM

## 2020-03-03 DIAGNOSIS — R06.2 WHEEZING: ICD-10-CM

## 2020-03-03 DIAGNOSIS — K21.9 GASTROESOPHAGEAL REFLUX DISEASE, ESOPHAGITIS PRESENCE NOT SPECIFIED: ICD-10-CM

## 2020-03-03 DIAGNOSIS — M54.6 CHRONIC BILATERAL THORACIC BACK PAIN: ICD-10-CM

## 2020-03-03 PROCEDURE — 3008F PR BODY MASS INDEX (BMI) DOCUMENTED: ICD-10-PCS | Mod: CPTII,S$GLB,, | Performed by: FAMILY MEDICINE

## 2020-03-03 PROCEDURE — 99214 PR OFFICE/OUTPT VISIT, EST, LEVL IV, 30-39 MIN: ICD-10-PCS | Mod: S$GLB,,, | Performed by: FAMILY MEDICINE

## 2020-03-03 PROCEDURE — 99999 PR PBB SHADOW E&M-EST. PATIENT-LVL IV: ICD-10-PCS | Mod: PBBFAC,,, | Performed by: FAMILY MEDICINE

## 2020-03-03 PROCEDURE — 99999 PR PBB SHADOW E&M-EST. PATIENT-LVL IV: CPT | Mod: PBBFAC,,, | Performed by: FAMILY MEDICINE

## 2020-03-03 PROCEDURE — 99214 OFFICE O/P EST MOD 30 MIN: CPT | Mod: S$GLB,,, | Performed by: FAMILY MEDICINE

## 2020-03-03 PROCEDURE — 3008F BODY MASS INDEX DOCD: CPT | Mod: CPTII,S$GLB,, | Performed by: FAMILY MEDICINE

## 2020-03-03 RX ORDER — LEVOCETIRIZINE DIHYDROCHLORIDE 5 MG/1
5 TABLET, FILM COATED ORAL NIGHTLY
Qty: 90 TABLET | Refills: 4 | Status: SHIPPED | OUTPATIENT
Start: 2020-03-03 | End: 2021-03-10 | Stop reason: SDUPTHER

## 2020-03-03 RX ORDER — PROMETHAZINE HYDROCHLORIDE AND DEXTROMETHORPHAN HYDROBROMIDE 6.25; 15 MG/5ML; MG/5ML
5 SYRUP ORAL EVERY 6 HOURS PRN
Qty: 236 ML | Refills: 3 | Status: SHIPPED | OUTPATIENT
Start: 2020-03-03 | End: 2020-03-13

## 2020-03-03 RX ORDER — ESOMEPRAZOLE MAGNESIUM 40 MG/1
40 CAPSULE, DELAYED RELEASE ORAL
Qty: 180 CAPSULE | Refills: 4 | Status: SHIPPED | OUTPATIENT
Start: 2020-03-03 | End: 2020-03-10 | Stop reason: CLARIF

## 2020-03-03 NOTE — PROGRESS NOTES
Office Visit    Patient Name: Daya Limon    : 1980  MRN: [id number]    Subjective:  Daya is a 40 y.o. female who presents today for:    flu like symptoms    40-year-old generally healthy patient of mine, last seen by me for annual exam 10/18/2019 (labs 10/21/19 all unremarkable), who presents today for urgent care visit secondary to body aches.    It initially sounded like she had a concern for an acute illness when she made the appointment and a flu test was done, which was negative.    However, once we discussed her symptoms, it sounds like she is actually having some chronic musculoskeletal back pain and a chronic ongoing cough.    Firstly regarding her cough we have discussed this in the past and made a likely diagnosis of allergies verses GERD as the cause.  Unfortunately she has not been consistent about taking her Nexium or her antihistamine, so it is unclear what affect these did have on her cough.  She reports an ongoing cough, especially after eating and upon 1st awakening in the morning.  She reports a tickle in her throat and frequent throat clearing.  She reports that she coughs after eating especially sweets.  She tried using a family member's promethazine DM cough syrup and this does help her.  She also feels at times when she is having coughing fits that she is wheezing and having chest tightness and will at those times used her daughter's nebulizer which she reports seems to help.  She has no previous diagnosis of asthma or reactive airway disease. She did have interstitial pneumonia back in 2019 and does feel that some of her symptoms started shortly after that.    Secondly, regarding her back pain.  This seems to have come on after in [facility] [employer] trip.  Prior to the trip she was going to the gym regularly and this seemed to allow her to do her daily tasks without pain.  Ever since coming back from her trip she has been out of her exercise routine.  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  The pain sometimes radiates down her arms.  She feels that her stamina to cook and clean several hours is declining due to this pain.    Additional concerns include some hair loss, cycle irregularity.  She did take birth control temporarily when on her trip to avoid having her cycle and since then has had a bit of menstrual irregularity.  It is not overly concerning and seemingly returning to her baseline.    Past Medical History  Past Medical History:   Diagnosis Date    Body mass index (BMI) 24.0-24.9, adult 4/11/2016    GERD (gastroesophageal reflux disease) 4/12/2017    Thyroid nodule     Vitamin D deficiency 4/16/2017       Past Surgical History  Past Surgical History:   Procedure Laterality Date    VAGINAL DELIVERY      X2       Family History  Family History   Problem Relation Age of Onset    Diabetes Mother     Diabetes Father     Kidney disease Father     Kidney failure Father     Cataracts Father     No Known Problems Sister     No Known Problems Brother     No Known Problems Maternal Aunt     No Known Problems Maternal Uncle     No Known Problems Paternal Aunt     No Known Problems Paternal Uncle     No Known Problems Maternal Grandmother     No Known Problems Maternal Grandfather     No Known Problems Paternal Grandmother     No Known Problems Paternal Grandfather     Breast cancer Neg Hx     Colon cancer Neg Hx     Ovarian cancer Neg Hx     Hypertension Neg Hx     Stroke Neg Hx     Amblyopia Neg Hx     Blindness Neg Hx     Cancer Neg Hx     Glaucoma Neg Hx     Macular degeneration Neg Hx     Retinal detachment Neg Hx     Strabismus Neg Hx     Thyroid disease Neg Hx     Thyroid nodules Neg Hx     Thyroid cancer Neg Hx        Social History  Social History     Socioeconomic  "History    Marital status:      Spouse name: Not on file    Number of children: 2    Years of education: Not on file    Highest education level: Not on file   Occupational History    Not on file   Social Needs    Financial resource strain: Not on file    Food insecurity:     Worry: Not on file     Inability: Not on file    Transportation needs:     Medical: Not on file     Non-medical: Not on file   Tobacco Use    Smoking status: Never Smoker    Smokeless tobacco: Never Used   Substance and Sexual Activity    Alcohol use: No    Drug use: No    Sexual activity: Yes     Partners: Male     Birth control/protection: None   Lifestyle    Physical activity:     Days per week: Not on file     Minutes per session: Not on file    Stress: Not on file   Relationships    Social connections:     Talks on phone: Not on file     Gets together: Not on file     Attends Judaism service: Not on file     Active member of club or organization: Not on file     Attends meetings of clubs or organizations: Not on file     Relationship status: Not on file   Other Topics Concern    Not on file   Social History Narrative    Not on file       Current Medications  Medications reviewed and updated.     Allergies   Review of patient's allergies indicates:  No Known Allergies    Review of Systems (Pertinent positives)  Review of Systems   Constitutional: Positive for fatigue. Negative for fever.   HENT: Positive for postnasal drip.    Respiratory: Positive for cough.    Gastrointestinal: Negative for constipation. Abdominal pain: GERD.   Genitourinary: Positive for menstrual problem.   Musculoskeletal: Positive for back pain.       /62   Pulse 71   Temp 98.1 °F (36.7 °C)   Ht 5' 1" (1.549 m)   Wt 60.4 kg (133 lb 2.5 oz)   SpO2 97%   BMI 25.16 kg/m²     Physical Exam   Constitutional: She is oriented to person, place, and time. She appears well-developed and well-nourished. No distress.   HENT:   Head: " Normocephalic and atraumatic.   Right Ear: Tympanic membrane normal.   Left Ear: Tympanic membrane normal.   Nose: No mucosal edema or rhinorrhea. Right sinus exhibits no maxillary sinus tenderness and no frontal sinus tenderness. Left sinus exhibits no maxillary sinus tenderness and no frontal sinus tenderness.   Mouth/Throat: Posterior oropharyngeal erythema present. No oropharyngeal exudate or posterior oropharyngeal edema.   Eyes: Conjunctivae are normal.   Cardiovascular: Normal rate and regular rhythm.   Pulmonary/Chest: Effort normal and breath sounds normal. No accessory muscle usage or stridor. No respiratory distress. She has no decreased breath sounds. She has no wheezes. She has no rhonchi. She has no rales.       Musculoskeletal: She exhibits no edema.   Neurological: She is alert and oriented to person, place, and time.   Skin: Skin is warm and dry.   Psychiatric: She has a normal mood and affect.   Vitals reviewed.        Assessment/Plan:  Daya Limon is a 40 y.o. female who presents today for :    Daya was seen today for flu like symptoms.    Diagnoses and all orders for this visit:    Flu-like symptoms  Comments:  In reality patient has chronic musculoskeletal back pain and chronic cough.  Rapid flu test is negative.  Orders:  -     POCT Influenza A/B    Gastroesophageal reflux disease, esophagitis presence not specified  Comments:  Needs to resume Nexium, starting with 40 mg b.i.d. a.c. then can maintain with Nexium 40 mg daily.  Cough after eating almost certainly secondary to reflux  Orders:  -     esomeprazole (NEXIUM) 40 MG capsule; Take 1 capsule (40 mg total) by mouth 2 (two) times daily before meals.    Post-nasal drainage  Comments:  Possibly secondary to GERD versus allergic rhinitis, have advised starting nightly antihistamine.  Orders:  -     levocetirizine (XYZAL) 5 MG tablet; Take 1 tablet (5 mg total) by mouth every evening.    Non-seasonal allergic rhinitis, unspecified trigger  -      levocetirizine (XYZAL) 5 MG tablet; Take 1 tablet (5 mg total) by mouth every evening.    Chronic cough  Comments:  Most likely culprits are GERD vs allergic rhinitis.  She declines ENT referral with nasopharyngoscopy for further eval, will try PPI/antihistamine for 6 wks  Orders:  -     promethazine-dextromethorphan (PROMETHAZINE-DM) 6.25-15 mg/5 mL Syrp; Take 5 mLs by mouth every 6 (six) hours as needed (cough).    Wheezing  Comments:  Treat GERD, allergies, albuterol based inhaler given.  Consider future PFTs, though no prior diagnosis of reactive airways disease  Orders:  -     albuterol sulfate (PROAIR RESPICLICK) 90 mcg/actuation AePB; Inhale 180 mcg into the lungs every 4 (four) hours as needed (cough wheezing). Rescue    Chronic bilateral thoracic back pain  Comments:  Have advised on trial of physical therapy for pain management and postural/strengthening exercises.  Orders:  -     Ambulatory referral/consult to Physical/Occupational Therapy; Future    Seborrheic keratosis  Comments:  Of abdomen, benign in appearance, likely irritated as it is along her lower bra line.  Ongoing monitoring.            ICD-10-CM ICD-9-CM    1. Flu-like symptoms R68.89 780.99 POCT Influenza A/B    In reality patient has chronic musculoskeletal back pain and chronic cough.  Rapid flu test is negative.   2. Gastroesophageal reflux disease, esophagitis presence not specified K21.9 530.81 esomeprazole (NEXIUM) 40 MG capsule    Needs to resume Nexium, starting with 40 mg b.i.d. a.c. then can maintain with Nexium 40 mg daily.  Cough after eating almost certainly secondary to reflux   3. Post-nasal drainage R09.82 473.9 levocetirizine (XYZAL) 5 MG tablet    Possibly secondary to GERD versus allergic rhinitis, have advised starting nightly antihistamine.   4. Non-seasonal allergic rhinitis, unspecified trigger J30.89 477.8 levocetirizine (XYZAL) 5 MG tablet   5. Chronic cough R05 786.2 promethazine-dextromethorphan (PROMETHAZINE-DM)  6.25-15 mg/5 mL Syrp    Most likely culprits are GERD vs allergic rhinitis.  She declines ENT referral with nasopharyngoscopy for further eval, will try PPI/antihistamine for 6 wks   6. Wheezing R06.2 786.07 albuterol sulfate (PROAIR RESPICLICK) 90 mcg/actuation AePB    Treat GERD, allergies, albuterol based inhaler given.  Consider future PFTs, though no prior diagnosis of reactive airways disease   7. Chronic bilateral thoracic back pain M54.6 724.1 Ambulatory referral/consult to Physical/Occupational Therapy    G89.29 338.29     Have advised on trial of physical therapy for pain management and postural/strengthening exercises.   8. Seborrheic keratosis L82.1 702.19     Of abdomen, benign in appearance, likely irritated as it is along her lower bra line.  Ongoing monitoring.       There are no Patient Instructions on file for this visit.      Follow up in about 6 weeks (around 4/14/2020) for return as needed for new concerns.

## 2020-03-09 ENCOUNTER — CLINICAL SUPPORT (OUTPATIENT)
Dept: REHABILITATION | Facility: HOSPITAL | Age: 40
End: 2020-03-09
Payer: COMMERCIAL

## 2020-03-09 DIAGNOSIS — R29.3 STOOPED POSTURE: ICD-10-CM

## 2020-03-09 DIAGNOSIS — R53.1 DECREASED STRENGTH: ICD-10-CM

## 2020-03-09 DIAGNOSIS — G89.29 CHRONIC BILATERAL THORACIC BACK PAIN: Primary | ICD-10-CM

## 2020-03-09 DIAGNOSIS — M54.6 CHRONIC BILATERAL THORACIC BACK PAIN: Primary | ICD-10-CM

## 2020-03-09 PROCEDURE — 97161 PT EVAL LOW COMPLEX 20 MIN: CPT | Mod: PN

## 2020-03-09 PROCEDURE — 97110 THERAPEUTIC EXERCISES: CPT | Mod: PN

## 2020-03-09 NOTE — PLAN OF CARE
[facility]  Physical Therapy Initial Evaluation    Name: Daya Limon  Clinic Number: [id number]    Therapy Diagnosis:   Encounter Diagnoses   Name Primary?    Chronic bilateral thoracic back pain Yes    Decreased strength     Stooped posture      Physician: Esthela Abdi MD    Physician Orders: PT Eval and Treat   Medical Diagnosis from Referral: M54.6,G89.29 (ICD-10-CM) - Chronic bilateral thoracic back pain  Evaluation Date: 3/9/2020  Authorization Period Expiration: 12/31/2020  Plan of Care Expiration: 3/9/2020 to 4/24/2020  Visit # / Visits authorized: 1/50  FOTO: 1/10    Time In: 0910  Time Out: 0954  Total Billable Time: 44 minutes    Precautions: Standard    Subjective     Date of onset: 6 months; progressing within the last month.    History of current condition - Daya reports: chronic midline thoracic back pain and R elbow and posterior forearm and hand pain beginning 6 months ago; insidious onset and no previous occurrence. Pain occurs more so at night and upon waking up. Patient denies numbness or tingling in either arm and this in addition to pain in either LE. Patient denies changes in appetite or weight over the last 6 months.      Medical History:   Past Medical History:   Diagnosis Date    Body mass index (BMI) 24.0-24.9, adult 4/11/2016    GERD (gastroesophageal reflux disease) 4/12/2017    Thyroid nodule     Vitamin D deficiency 4/16/2017     Surgical History:   Daya Limon  has a past surgical history that includes Vaginal delivery.    Medications:   Daya has a current medication list which includes the following prescription(s): albuterol sulfate, cholecalciferol (vitamin d3), ibuprofen, levocetirizine, omeprazole, and promethazine-dextromethorphan.    Allergies:   Review of patient's allergies indicates:  No Known Allergies     Imaging: None    Prior Therapy: Never  Social History: Patient lives in 2 story house with  and 3 children ages 5-12.  Patient reports increased pain with cooking, cleaning and caring for children  Occupation: Patient is a housewife  Prior Level of Function: Independent  Current Level of Function: Independent; assistance from cleaning woman,  and children    Pain:  Current 5/10, worst 8/10, best 4/10   Location: Midline of thoracic spine.  Description: Numb, fatigue  Aggravating Factors: Standing and sitting without back support; lifting, carrying, and pushing  Easing Factors: Sitting with back support    Current 5/10, worst 8/10, best 4/10   Location: R elbow, anterior arm and   Description: Stiffness  Aggravating Factors: Lifting, carrying, and pushing. Stirring  Easing Factors: Massage     Pts goals: Eliminate pain    Objective     WNL=within normal limits  WFL=within functional limits  NT=not tested  !=pain    Posture: Sitting: Stooped/upper crossed positioning   Palpation: Tenderness to B upper trapezius, cervical paraspinals, and thoracic spinous processes greatest near T3-4    Range of Motion/Strength:     CERVICAL AROM Pain/Dysfunction with Movement   Flexion 43    Extension 43    Right side bending 35    Left side bending 35    Right rotation 67    Left rotation 66      Shoulder Right Left Pain/Dysfunction with Movement   AROM/PROM      flexion  157/180!  167/180 Pain to R upper thoracic area   extension  WNL/NT  WNL/NT    abduction  WNL/NT  WNL/NT    IR at 90° abd  WNL/NT  WNL/NT    IR at 0° abd  WNL/NT  WNL/NT    ER at 90° abd  WNL/NT  WNL/NT    ER at 0° abd  WNL/NT  WNL/NT      Elbow Right Left Pain/Dysfunction with Movement   AROM/PROM      flexion  WNL/NT  WNL/NT    extension WNL/NT! WNL/NT    pronation WNL/NT WNL/NT    supination  WNL/NT  WNL/NT      U/E MMT Right Left Pain/Dysfunction with Movement   Shoulder Flexion 4-/5! 4/5 Pain to R antebrachial area   Shoulder Extension 4-/5 4-/5    Shoulder Abduction 4-/5! 4/5 Pain to R antebrachial area   Shoulder IR  @ 90* Abduction 4+/5 4+/5    Shoulder ER  @ 90*  "Abduction 4-/5 4/5    Elbow Flexion  4/5! 5/5 R side tested in neutral, pronation and supination; pain in neutral and supination   Elbow Extension 4+/5 4+/5      Joint Mobility:   - Thoracic: Hypomobile with central posterior to anterior mobilizations ()  - Lumbar: Normal with     Flexibility: Consistent with upper crossed posture    Special Tests: Speed's (+) R for bicep pain    CMS Impairment/Limitation/Restriction for FOTO Thoracic Spine Survey    Therapist reviewed FOTO scores for Daya Limon on 3/9/2020.   FOTO documents entered into "[employer] - see Media section.    Limitation Score: 55%  Category: Mobility     TREATMENT     Treatment Time In: 0936  Treatment Time Out: 0954  Total Treatment time separate from Evaluation: 18 minutes    Daya received therapeutic exercises to develop strength, ROM, flexibility and posture for 18 minutes including:  Biceps/wrist flexor stretch: x30" R  Biceps isometric: 5"x10  Scapular retraction/depression: 5"x10  Thoracic extension mobilizations against towel roll, upper: x10    Corner pectoralis stretch: x30"    Home Exercises and Patient Education Provided:     Education provided:  - Possible mechanisms contributing to pain and impairments; role of therapy/plan of care  - HEP    Written Home Exercises Provided: yes.  Exercises were reviewed and Daya was able to demonstrate them prior to the end of the session.  Daya demonstrated good  understanding of the education provided.     See EMR under Patient Instructions for exercises provided 3/9/2020.    Assessment     Daya is a 40 y.o. female referred to outpatient Physical Therapy with a medical diagnosis of chronic bilateral thoracic back pain. Pt presents to initial evaluation throughout bilateral trunk that seems myofascial in nature from sustained stooped/upper crossed positing. Patient also presents pain to R biceps and brachioradialis; possibly related to repeated cooking and cleaning with dominant use of R UE. Quick " fatigue with exercises performed, particularly scapular retraction and depression. Thoracic hypomobility noted which may also be related to posture.     Pt prognosis is Good.   Pt will benefit from skilled outpatient Physical Therapy to address the deficits stated above and in the chart below, provide pt/family education, and to maximize pt's level of independence.     Plan of care discussed with patient: Yes  Pt's spiritual, cultural and educational needs considered and pt agreeable to plan of care and goals as stated below: FEMALE 1:1 IN PRIVATE TREATMENT ROOM    Anticipated Barriers for therapy: Chronicity of pain    Medical Necessity is demonstrated by the following  History  Co-morbidities and personal factors that may impact the plan of care Co-morbidities:   Back pain    Personal Factors:   lifestyle-exercise seldom or never     low   Examination  Body Structures and Functions, activity limitations and participation restrictions that may impact the plan of care Body Regions:   neck  back  upper extremities  trunk    Body Systems:    ROM  strength  transfers  transitions    Participation Restrictions:   Household and community navigation    Activity limitations:   Learning and applying knowledge  no deficits    General Tasks and Commands  no deficits    Communication  no deficits    Mobility  lifting and carrying objects  walking    Self care  washing oneself (bathing, drying, washing hands)  caring for body parts (brushing teeth, shaving, grooming)  dressing    Domestic Life  shopping  cooking  doing house work (cleaning house, washing dishes, laundry)  assisting others    Interactions/Relationships  family relationships    Life Areas  no deficits    Community and Social Life  community life  recreation and leisure  Yazdanism and spirituality         moderate   Clinical Presentation stable and uncomplicated low   Decision Making/ Complexity Score: low     Short Term Goals (3 Weeks):  1. Pt will be compliant  with HEP to supplement PT in decreasing pain with functional mobility.  2. Pt will perform crate carries with 10# without pain to promote functional QOL.   3. Pt will improve impaired UE MMTs to >/=4/5 to improve strength for functional tasks.  Long Term Goals (6 Weeks):   1. Pt will improve FOTO score to </= 38% limited to decrease perceived limitation with maintaining/changing body position.   2. Pt will perform crate carries with 20# without pain to promote functional QOL.   3. Pt will improve impaired UE MMTs to >/=4+/5 to improve strength for functional tasks.  4. Pt will report no pain with pushing a grocery cart to promote functional QOL.   4. Pt will flex elbow while cooking and cleaning without pain to promote independence with household tasks.    Plan     Plan of care Certification: 3/9/2020 to 4/24/2020.    Outpatient Physical Therapy 14 visits in 45 days to include the following interventions: Cervical/Lumbar Traction, Electrical Stimulation -, Gait Training, Manual Therapy, Moist Heat/ Ice, Neuromuscular Re-ed, Patient Education, Self Care, Therapeutic Activites and Therapeutic Exercise.     Aracelis Wei, PT, DPT

## 2020-03-09 NOTE — PATIENT INSTRUCTIONS
Aracelis Wei, PT, DPT, cert. DN  Email: [email or web address]  Clinic number: [phone]

## 2020-03-10 ENCOUNTER — TELEPHONE (OUTPATIENT)
Dept: FAMILY MEDICINE | Facility: CLINIC | Age: 40
End: 2020-03-10

## 2020-03-10 DIAGNOSIS — K21.9 GASTROESOPHAGEAL REFLUX DISEASE, ESOPHAGITIS PRESENCE NOT SPECIFIED: Primary | ICD-10-CM

## 2020-03-10 PROBLEM — G89.29 CHRONIC BILATERAL THORACIC BACK PAIN: Status: ACTIVE | Noted: 2020-03-10

## 2020-03-10 PROBLEM — M54.6 CHRONIC BILATERAL THORACIC BACK PAIN: Status: ACTIVE | Noted: 2020-03-10

## 2020-03-10 PROBLEM — R29.3 STOOPED POSTURE: Status: ACTIVE | Noted: 2020-03-10

## 2020-03-10 PROBLEM — R53.1 DECREASED STRENGTH: Status: ACTIVE | Noted: 2020-03-10

## 2020-03-10 RX ORDER — OMEPRAZOLE 40 MG/1
40 CAPSULE, DELAYED RELEASE ORAL
Qty: 180 CAPSULE | Refills: 4 | Status: SHIPPED | OUTPATIENT
Start: 2020-03-10 | End: 2021-03-10 | Stop reason: ALTCHOICE

## 2020-03-10 NOTE — TELEPHONE ENCOUNTER
RX for omeprazole sent as an alternative-- she should let us know if there are any further issues with the RX.

## 2020-03-13 DIAGNOSIS — Z12.39 BREAST CANCER SCREENING: ICD-10-CM

## 2020-03-18 ENCOUNTER — TELEPHONE (OUTPATIENT)
Dept: REHABILITATION | Facility: HOSPITAL | Age: 40
End: 2020-03-18

## 2020-03-18 DIAGNOSIS — R53.1 DECREASED STRENGTH: ICD-10-CM

## 2020-03-18 DIAGNOSIS — R29.3 STOOPED POSTURE: ICD-10-CM

## 2020-03-18 DIAGNOSIS — G89.29 CHRONIC BILATERAL THORACIC BACK PAIN: Primary | ICD-10-CM

## 2020-03-18 DIAGNOSIS — M54.6 CHRONIC BILATERAL THORACIC BACK PAIN: Primary | ICD-10-CM

## 2020-03-18 NOTE — TELEPHONE ENCOUNTER
Postponed Appointments    Patient: Daya Limon  Date: 3/18/2020  Diagnosis:   1. Chronic bilateral thoracic back pain     2. Decreased strength     3. Stooped posture       MRN: [id number]    Left message due to therapy following updates regarding COVID-19 closely and taking every precaution to ensure the safety of our patients, staff and community.  In an abundance of caution and in an effort to help reduce risk and limit community spread, we have decided to temporarily postpone appointments for patients who may be at increased risk to attend in-person therapy or where therapy is not critically needed at this time. Also stated to patient that we are exploring virtual methods of providing care and will be in touch over the next few weeks. Gave clinic number if patient has questions regarding plan of care, home exercise program, or anything else regarding her care moving forward.    Aracelis Wei, PT, DPT, Cert DN  3/18/2020

## 2020-03-25 ENCOUNTER — TELEPHONE (OUTPATIENT)
Dept: REHABILITATION | Facility: HOSPITAL | Age: 40
End: 2020-03-25

## 2020-03-25 DIAGNOSIS — R53.1 DECREASED STRENGTH: ICD-10-CM

## 2020-03-25 DIAGNOSIS — G89.29 CHRONIC BILATERAL THORACIC BACK PAIN: Primary | ICD-10-CM

## 2020-03-25 DIAGNOSIS — M54.6 CHRONIC BILATERAL THORACIC BACK PAIN: Primary | ICD-10-CM

## 2020-03-25 DIAGNOSIS — R29.3 STOOPED POSTURE: ICD-10-CM

## 2020-03-25 NOTE — TELEPHONE ENCOUNTER
Postponed Appointments    Patient: Daya Limon  Date: 3/25/2020  Diagnosis:   1. Chronic bilateral thoracic back pain     2. Decreased strength     3. Stooped posture       MRN: [id number]    Left voicemail with patient due to therapy following updates regarding COVID-19 closely and taking every precaution to ensure the safety of our patients, staff and community.  In an abundance of caution and in an effort to help reduce risk and limit community spread, we have decided to temporarily postpone appointments for patients who may be at increased risk to attend in-person therapy or where therapy is not critically needed at this time--until April 17 at 9 AM. Gave clinic number if patient wishes to discuss plan of care and home exercise program to ensure patient has what they need to continue therapy at home. Informed patient that therapist will also e-mail patient with address provided.     3/25/2020  Aracelis Wei, PT

## 2020-03-30 ENCOUNTER — TELEPHONE (OUTPATIENT)
Dept: REHABILITATION | Facility: HOSPITAL | Age: 40
End: 2020-03-30

## 2020-03-30 DIAGNOSIS — R29.3 STOOPED POSTURE: ICD-10-CM

## 2020-03-30 DIAGNOSIS — R53.1 DECREASED STRENGTH: ICD-10-CM

## 2020-03-30 DIAGNOSIS — M54.6 CHRONIC BILATERAL THORACIC BACK PAIN: Primary | ICD-10-CM

## 2020-03-30 DIAGNOSIS — G89.29 CHRONIC BILATERAL THORACIC BACK PAIN: Primary | ICD-10-CM

## 2020-03-30 NOTE — TELEPHONE ENCOUNTER
Called patient to check on her. Patient reports variable pain and home exercise program (HEP) performance, especially with her children at home and beginning home schooling. Therapist informed patient we are planning to extend therapy hold until at least the end of April. Inquired in the event we are able to do telehealth visits if patient is okay with this. Patient reports she would rather wait until in person follow-up visits can resume. Therapist verbalized understanding and inquired if patient would like a copy of her HEP from the initial evaluation e-mailed to the address on file; patient responded yes. Will send in the next few minutes.

## 2020-04-07 ENCOUNTER — TELEPHONE (OUTPATIENT)
Dept: FAMILY MEDICINE | Facility: CLINIC | Age: 40
End: 2020-04-07

## 2020-04-07 NOTE — TELEPHONE ENCOUNTER
I called patient to reschedule her appointment with Dr. Abdi. Patient will call back at another time to reschedule.

## 2020-04-27 ENCOUNTER — HOSPITAL ENCOUNTER (OUTPATIENT)
Dept: RADIOLOGY | Facility: HOSPITAL | Age: 40
Discharge: HOME | End: 2020-04-27
Attending: FAMILY MEDICINE
Payer: COMMERCIAL

## 2020-04-27 DIAGNOSIS — R05.3 PERSISTENT COUGH: ICD-10-CM

## 2020-04-27 PROCEDURE — 71046 X-RAY EXAM CHEST 2 VIEWS: CPT | Mod: TC,FY,PO

## 2020-04-27 PROCEDURE — 71046 X-RAY EXAM CHEST 2 VIEWS: CPT | Mod: 26,,, | Performed by: RADIOLOGY

## 2020-04-27 PROCEDURE — 71046 XR CHEST PA AND LATERAL: ICD-10-PCS | Mod: 26,,, | Performed by: RADIOLOGY

## 2020-05-12 ENCOUNTER — TELEPHONE (OUTPATIENT)
Dept: REHABILITATION | Facility: HOSPITAL | Age: 40
End: 2020-05-12

## 2020-05-12 NOTE — TELEPHONE ENCOUNTER
Resume Appointments    Patient: Daya Limon  Date: 5/12/2020  MRN: [id number]    Called pt on behalf of treating therapist, Aracelis.  Spoke with patient following updates regarding COVID-19.  Offered resuming in person therapy visits.  Pt declined at this time. Pt would like a call back in a few weeks.     5/12/2020  Lacey Huddleston, MISAEL-SLP

## 2020-05-13 ENCOUNTER — OFFICE VISIT (OUTPATIENT)
Dept: FAMILY MEDICINE | Facility: CLINIC | Age: 40
End: 2020-05-13
Payer: COMMERCIAL

## 2020-05-13 DIAGNOSIS — R09.82 POST-NASAL DRAINAGE: ICD-10-CM

## 2020-05-13 DIAGNOSIS — K21.9 GASTROESOPHAGEAL REFLUX DISEASE, ESOPHAGITIS PRESENCE NOT SPECIFIED: ICD-10-CM

## 2020-05-13 DIAGNOSIS — R05.3 CHRONIC COUGH: ICD-10-CM

## 2020-05-13 DIAGNOSIS — E04.1 RIGHT THYROID NODULE: ICD-10-CM

## 2020-05-13 DIAGNOSIS — J98.01 COUGH DUE TO BRONCHOSPASM: Primary | ICD-10-CM

## 2020-05-13 PROCEDURE — 99214 PR OFFICE/OUTPT VISIT, EST, LEVL IV, 30-39 MIN: ICD-10-PCS | Mod: 95,,, | Performed by: FAMILY MEDICINE

## 2020-05-13 PROCEDURE — 99214 OFFICE O/P EST MOD 30 MIN: CPT | Mod: 95,,, | Performed by: FAMILY MEDICINE

## 2020-05-13 RX ORDER — CODEINE PHOSPHATE AND GUAIFENESIN 10; 100 MG/5ML; MG/5ML
5 SOLUTION ORAL 3 TIMES DAILY PRN
Qty: 236 ML | Refills: 0 | Status: SHIPPED | OUTPATIENT
Start: 2020-05-13 | End: 2020-05-23

## 2020-05-13 NOTE — PROGRESS NOTES
Office Visit    Patient Name: Daya Limon    : 1980  MRN: [id number]    Subjective:  Daya is a 40 y.o. female who presents today for:    No chief complaint on file.    The patient location is: HER HOME  The chief complaint leading to consultation is: follow up of chronic cough   Visit type: audiovisual  Total time spent with patient: START 230 END 250PM  Each patient to whom he or she provides medical services by telemedicine is:  (1) informed of the relationship between the physician and patient and the respective role of any other health care provider with respect to management of the patient; and (2) notified that he or she may decline to receive medical services by telemedicine and may withdraw from such care at any time.    39 yo patient of mine with h/o GERD and allergic rhinitis who presents for VIRTUAL VISIT FOR REEVALUATION OF AN ONGOING COUGH. This cough was previously discussed at in person OV 3/3/20. It was thought at that time that the cough was likely secondary to her reflux or allergies, so she was advised to regularly take a PPI (starting at BID AC at first) and xyzal antihistamine nightly. It sounds like she has taken these fairly consistently with some benefit, though she reports ongoing intrusive nature of the cough at various times, especially right after eating. She notes that eating fruit after not eating for a while, such as when she breaks her Ramadan fast elicits a strong coughing fit.  She has intermittent associated wheezing despite no h/o asthma or lung disease. She has continued to use an albuterol inhaler regularly, and it does provide short term benefit. Her  had some 50 mg prednisone pills that he takes intermittently for arthritis and she has taken one of these the last couple of days with noted benefit. Also notes oderate benefit from Cheratussin cough syrup. She denies epigastric pain, burning. She endorses post nasal drip but otherwise no significant allergy symptoms  like sneezing, itchy watery eyes etc-- feels xyzal does help with those types of symptoms. No illness symptoms such as fever/chills/ nausea/ vomiting.     Her family member, my colleague Dr Sarmiento put in order for chest xray when she expressed her concerns about ongoing cough.     CXR 4/27/20: I detect no radiographic evidence of pneumonia or other source of cough    Past Medical History  Past Medical History:   Diagnosis Date    Body mass index (BMI) 24.0-24.9, adult 4/11/2016    GERD (gastroesophageal reflux disease) 4/12/2017    Thyroid nodule     Vitamin D deficiency 4/16/2017       Past Surgical History  Past Surgical History:   Procedure Laterality Date    VAGINAL DELIVERY      X2       Family History  Family History   Problem Relation Age of Onset    Diabetes Mother     Diabetes Father     Kidney disease Father     Kidney failure Father     Cataracts Father     No Known Problems Sister     No Known Problems Brother     No Known Problems Maternal Aunt     No Known Problems Maternal Uncle     No Known Problems Paternal Aunt     No Known Problems Paternal Uncle     No Known Problems Maternal Grandmother     No Known Problems Maternal Grandfather     No Known Problems Paternal Grandmother     No Known Problems Paternal Grandfather     Breast cancer Neg Hx     Colon cancer Neg Hx     Ovarian cancer Neg Hx     Hypertension Neg Hx     Stroke Neg Hx     Amblyopia Neg Hx     Blindness Neg Hx     Cancer Neg Hx     Glaucoma Neg Hx     Macular degeneration Neg Hx     Retinal detachment Neg Hx     Strabismus Neg Hx     Thyroid disease Neg Hx     Thyroid nodules Neg Hx     Thyroid cancer Neg Hx        Social History  Social History     Socioeconomic History    Marital status:      Spouse name: Not on file    Number of children: 2    Years of education: Not on file    Highest education level: Not on file   Occupational History    Not on file   Social Needs    Financial resource  strain: Not on file    Food insecurity:     Worry: Not on file     Inability: Not on file    Transportation needs:     Medical: Not on file     Non-medical: Not on file   Tobacco Use    Smoking status: Never Smoker    Smokeless tobacco: Never Used   Substance and Sexual Activity    Alcohol use: No    Drug use: No    Sexual activity: Yes     Partners: Male     Birth control/protection: None   Lifestyle    Physical activity:     Days per week: Not on file     Minutes per session: Not on file    Stress: Not on file   Relationships    Social connections:     Talks on phone: Not on file     Gets together: Not on file     Attends Religion service: Not on file     Active member of club or organization: Not on file     Attends meetings of clubs or organizations: Not on file     Relationship status: Not on file   Other Topics Concern    Not on file   Social History Narrative    Not on file       Current Medications  Medications reviewed and updated.     Allergies   Review of patient's allergies indicates:  No Known Allergies    Review of Systems (Pertinent positives)  Review of Systems   Constitutional: Negative for chills and fever.   HENT: Positive for postnasal drip. Negative for ear pain, rhinorrhea and sore throat.    Respiratory: Positive for cough, shortness of breath and wheezing.    Cardiovascular: Negative for chest pain.   Musculoskeletal: Negative for myalgias.   Skin: Negative for rash.   Allergic/Immunologic: Negative for environmental allergies.   Neurological: Negative for headaches.       There were no vitals taken for this visit.    Physical Exam   Constitutional: She is oriented to person, place, and time. She appears well-developed and well-nourished. No distress.   HENT:   Head: Normocephalic and atraumatic.   Eyes: Conjunctivae are normal.   Pulmonary/Chest: Effort normal.   Neurological: She is alert and oriented to person, place, and time.   Psychiatric: She has a normal mood and affect.          Assessment/Plan:  Daya Limon is a 40 y.o. female who presents today for :    Diagnoses and all orders for this visit:    Cough due to bronchospasm  Comments:  order PFTs for further eval (for RAD etc) given ongoing cough responsive to steroids & Albuterol. CXR (-). continue albuterol PRN but stop >24 hr prior to PFTS  Orders:  -     Complete PFT w/ bronchodilator; Future    Chronic cough  Comments:  still suspect allergies & GERD playing some roll-- has noted benefits from the PPI & antihistamine. Referall to ENT placed for further eval via laryngoscopy  Orders:  -     Complete PFT w/ bronchodilator; Future  -     Ambulatory referral/consult to ENT; Future  -     guaifenesin-codeine 100-10 mg/5 ml (CHERATUSSIN AC)  mg/5 mL syrup; Take 5 mLs by mouth 3 (three) times daily as needed for Cough or Congestion.    Gastroesophageal reflux disease, esophagitis presence not specified  Comments:  continue daily omeprazole before breakfast, ENT eval for LPR contributing to chronic cough, consider GI referal  Orders:  -     Ambulatory referral/consult to ENT; Future    Post-nasal drainage  Comments:  continue daily Xyzal  Orders:  -     Ambulatory referral/consult to ENT; Future    Right thyroid nodule  Comments:  s/p benign FNA 11/4/19, ongoing monitoring for compressive symptoms, not thought to be contributing to current cough             ICD-10-CM ICD-9-CM    1. Cough due to bronchospasm J98.01 519.11 Complete PFT w/ bronchodilator    order PFTs for further eval (for RAD etc) given ongoing cough responsive to steroids & Albuterol. CXR (-). continue albuterol PRN but stop >24 hr prior to PFTS   2. Chronic cough R05 786.2 Complete PFT w/ bronchodilator      Ambulatory referral/consult to ENT      guaifenesin-codeine 100-10 mg/5 ml (CHERATUSSIN AC)  mg/5 mL syrup    still suspect allergies & GERD playing some roll-- has noted benefits from the PPI & antihistamine. Referall to ENT placed for further eval via  laryngoscopy   3. Gastroesophageal reflux disease, esophagitis presence not specified K21.9 530.81 Ambulatory referral/consult to ENT    continue daily omeprazole before breakfast, ENT eval for LPR contributing to chronic cough, consider GI referal   4. Post-nasal drainage R09.82 473.9 Ambulatory referral/consult to ENT    continue daily Xyzal   5. Right thyroid nodule E04.1 241.0     s/p benign FNA 11/4/19, ongoing monitoring for compressive symptoms, not thought to be contributing to current cough        There are no Patient Instructions on file for this visit.      Follow up for to follow up on lab results, return as needed for new concerns.

## 2020-07-05 DIAGNOSIS — Z01.84 IMMUNITY STATUS TESTING: Primary | ICD-10-CM

## 2020-07-06 ENCOUNTER — LAB VISIT (OUTPATIENT)
Dept: LAB | Facility: HOSPITAL | Age: 40
End: 2020-07-06
Attending: FAMILY MEDICINE
Payer: COMMERCIAL

## 2020-07-06 DIAGNOSIS — Z01.84 IMMUNITY STATUS TESTING: ICD-10-CM

## 2020-07-06 PROCEDURE — 36415 COLL VENOUS BLD VENIPUNCTURE: CPT | Mod: PO

## 2020-07-06 PROCEDURE — 86769 SARS-COV-2 COVID-19 ANTIBODY: CPT

## 2020-07-07 LAB — SARS-COV-2 IGG SERPLBLD QL IA.RAPID: POSITIVE

## 2020-07-11 DIAGNOSIS — L02.91 ABSCESS: Primary | ICD-10-CM

## 2020-07-11 RX ORDER — SULFAMETHOXAZOLE AND TRIMETHOPRIM 800; 160 MG/1; MG/1
1 TABLET ORAL 2 TIMES DAILY
Qty: 20 TABLET | Refills: 0 | Status: SHIPPED | OUTPATIENT
Start: 2020-07-11 | End: 2020-07-21

## 2020-08-03 ENCOUNTER — HOSPITAL ENCOUNTER (OUTPATIENT)
Dept: PULMONOLOGY | Facility: HOSPITAL | Age: 40
Discharge: HOME | End: 2020-08-03
Attending: FAMILY MEDICINE
Payer: COMMERCIAL

## 2020-08-03 DIAGNOSIS — J98.01 COUGH DUE TO BRONCHOSPASM: ICD-10-CM

## 2020-08-03 DIAGNOSIS — R05.3 CHRONIC COUGH: ICD-10-CM

## 2020-08-03 PROCEDURE — 94010 BREATHING CAPACITY TEST: CPT

## 2020-08-04 ENCOUNTER — HOSPITAL ENCOUNTER (OUTPATIENT)
Dept: PULMONOLOGY | Facility: HOSPITAL | Age: 40
Discharge: HOME | End: 2020-08-04
Payer: COMMERCIAL

## 2020-08-10 LAB
BRPFT: ABNORMAL
FEF 25 75 LLN: 1.77
FEF 25 75 PRE REF: 49.7 %
FEF 25 75 REF: 2.84
FEV1 FVC LLN: 75
FEV1 FVC PRE REF: 89.8 %
FEV1 FVC REF: 85
FEV1 LLN: 1.88
FEV1 PRE REF: 75.4 %
FEV1 REF: 2.41
FRCN2 LLN: 1.69
FRCN2 PRE REF: 64.8 %
FRCN2 REF: 2.51
FVC LLN: 2.23
FVC PRE REF: 83.9 %
FVC REF: 2.83
PEF LLN: 4.73
PEF PRE REF: 88.3 %
PEF REF: 6.21
PRE FEF 25 75: 1.41 L/S (ref 1.77–3.91)
PRE FET 100: 7.26 SEC
PRE FEV1 FVC: 76.37 % (ref 75.43–94.68)
PRE FEV1: 1.82 L (ref 1.88–2.94)
PRE FRC N2: 1.63 L
PRE FVC: 2.38 L (ref 2.23–3.44)
PRE PEF: 5.49 L/S (ref 4.73–7.7)
VCMAXN2 LLN: 2.23
VCMAXN2 PRE REF: 83.9 %
VCMAXN2 PRE: 2.38 L (ref 2.23–3.44)
VCMAXN2 REF: 2.83

## 2020-09-15 ENCOUNTER — DOCUMENTATION ONLY (OUTPATIENT)
Dept: REHABILITATION | Facility: HOSPITAL | Age: 40
End: 2020-09-15

## 2020-09-15 DIAGNOSIS — R53.1 DECREASED STRENGTH: ICD-10-CM

## 2020-09-15 DIAGNOSIS — M54.6 CHRONIC BILATERAL THORACIC BACK PAIN: Primary | ICD-10-CM

## 2020-09-15 DIAGNOSIS — G89.29 CHRONIC BILATERAL THORACIC BACK PAIN: Primary | ICD-10-CM

## 2020-09-15 DIAGNOSIS — R29.3 STOOPED POSTURE: ICD-10-CM

## 2020-09-15 NOTE — PROGRESS NOTES
Pt was evaluated on 3/9/2020 and was seen 1 times for PT. Pt has not attended PT since evaluation. Patient given HEP. Plan of care . Current status is unknown. Pt to be discharged at this time.

## 2020-10-05 ENCOUNTER — PATIENT MESSAGE (OUTPATIENT)
Dept: ADMINISTRATIVE | Facility: HOSPITAL | Age: 40
End: 2020-10-05

## 2020-10-09 ENCOUNTER — TELEPHONE (OUTPATIENT)
Dept: OBSTETRICS AND GYNECOLOGY | Facility: CLINIC | Age: 40
End: 2020-10-09

## 2020-10-09 DIAGNOSIS — Z34.90 PREGNANCY, UNSPECIFIED GESTATIONAL AGE: Primary | ICD-10-CM

## 2020-10-14 ENCOUNTER — PATIENT OUTREACH (OUTPATIENT)
Dept: ADMINISTRATIVE | Facility: OTHER | Age: 40
End: 2020-10-14

## 2020-10-14 NOTE — PROGRESS NOTES
Care Everywhere:   Immunization: updated  Health Maintenance: updated  Media Review: review for outside mammogram report   Legacy Review:   Order placed:   Upcoming appts:  Mammogram scheduling ticket sent to patient's portal on 10/5/2020

## 2020-10-16 ENCOUNTER — PROCEDURE VISIT (OUTPATIENT)
Dept: OBSTETRICS AND GYNECOLOGY | Facility: CLINIC | Age: 40
End: 2020-10-16
Attending: OBSTETRICS & GYNECOLOGY
Payer: COMMERCIAL

## 2020-10-16 ENCOUNTER — LAB VISIT (OUTPATIENT)
Dept: LAB | Facility: OTHER | Age: 40
End: 2020-10-16
Attending: OBSTETRICS & GYNECOLOGY
Payer: COMMERCIAL

## 2020-10-16 VITALS — SYSTOLIC BLOOD PRESSURE: 104 MMHG | BODY MASS INDEX: 25.16 KG/M2 | DIASTOLIC BLOOD PRESSURE: 70 MMHG | HEIGHT: 61 IN

## 2020-10-16 DIAGNOSIS — Z34.90 PREGNANCY, UNSPECIFIED GESTATIONAL AGE: ICD-10-CM

## 2020-10-16 DIAGNOSIS — N91.2 ABSENT MENSES: ICD-10-CM

## 2020-10-16 DIAGNOSIS — Z36.89 ENCOUNTER TO ESTABLISH GESTATIONAL AGE USING ULTRASOUND: ICD-10-CM

## 2020-10-16 DIAGNOSIS — Z34.90 PREGNANCY, UNSPECIFIED GESTATIONAL AGE: Primary | ICD-10-CM

## 2020-10-16 LAB
ABO + RH BLD: NORMAL
B-HCG UR QL: POSITIVE
BASOPHILS # BLD AUTO: 0.04 K/UL (ref 0–0.2)
BASOPHILS # BLD AUTO: 0.04 K/UL (ref 0–0.2)
BASOPHILS NFR BLD: 0.5 % (ref 0–1.9)
BASOPHILS NFR BLD: 0.5 % (ref 0–1.9)
BLD GP AB SCN CELLS X3 SERPL QL: NORMAL
CTP QC/QA: YES
DIFFERENTIAL METHOD: ABNORMAL
DIFFERENTIAL METHOD: ABNORMAL
EOSINOPHIL # BLD AUTO: 0.3 K/UL (ref 0–0.5)
EOSINOPHIL # BLD AUTO: 0.3 K/UL (ref 0–0.5)
EOSINOPHIL NFR BLD: 3.7 % (ref 0–8)
EOSINOPHIL NFR BLD: 3.7 % (ref 0–8)
ERYTHROCYTE [DISTWIDTH] IN BLOOD BY AUTOMATED COUNT: 14.4 % (ref 11.5–14.5)
ERYTHROCYTE [DISTWIDTH] IN BLOOD BY AUTOMATED COUNT: 14.4 % (ref 11.5–14.5)
HCT VFR BLD AUTO: 41.1 % (ref 37–48.5)
HCT VFR BLD AUTO: 41.1 % (ref 37–48.5)
HGB BLD-MCNC: 13.1 G/DL (ref 12–16)
HGB BLD-MCNC: 13.1 G/DL (ref 12–16)
IMM GRANULOCYTES # BLD AUTO: 0.03 K/UL (ref 0–0.04)
IMM GRANULOCYTES # BLD AUTO: 0.03 K/UL (ref 0–0.04)
IMM GRANULOCYTES NFR BLD AUTO: 0.3 % (ref 0–0.5)
IMM GRANULOCYTES NFR BLD AUTO: 0.3 % (ref 0–0.5)
LYMPHOCYTES # BLD AUTO: 2.1 K/UL (ref 1–4.8)
LYMPHOCYTES # BLD AUTO: 2.1 K/UL (ref 1–4.8)
LYMPHOCYTES NFR BLD: 24.5 % (ref 18–48)
LYMPHOCYTES NFR BLD: 24.5 % (ref 18–48)
MCH RBC QN AUTO: 27.9 PG (ref 27–31)
MCH RBC QN AUTO: 27.9 PG (ref 27–31)
MCHC RBC AUTO-ENTMCNC: 31.9 G/DL (ref 32–36)
MCHC RBC AUTO-ENTMCNC: 31.9 G/DL (ref 32–36)
MCV RBC AUTO: 88 FL (ref 82–98)
MCV RBC AUTO: 88 FL (ref 82–98)
MONOCYTES # BLD AUTO: 0.5 K/UL (ref 0.3–1)
MONOCYTES # BLD AUTO: 0.5 K/UL (ref 0.3–1)
MONOCYTES NFR BLD: 6 % (ref 4–15)
MONOCYTES NFR BLD: 6 % (ref 4–15)
NEUTROPHILS # BLD AUTO: 5.6 K/UL (ref 1.8–7.7)
NEUTROPHILS # BLD AUTO: 5.6 K/UL (ref 1.8–7.7)
NEUTROPHILS NFR BLD: 65 % (ref 38–73)
NEUTROPHILS NFR BLD: 65 % (ref 38–73)
NRBC BLD-RTO: 0 /100 WBC
NRBC BLD-RTO: 0 /100 WBC
PLATELET # BLD AUTO: 221 K/UL (ref 150–350)
PLATELET # BLD AUTO: 221 K/UL (ref 150–350)
PMV BLD AUTO: 12 FL (ref 9.2–12.9)
PMV BLD AUTO: 12 FL (ref 9.2–12.9)
RBC # BLD AUTO: 4.69 M/UL (ref 4–5.4)
RBC # BLD AUTO: 4.69 M/UL (ref 4–5.4)
RPR SER QL: NORMAL
TSH SERPL DL<=0.005 MIU/L-ACNC: 2.42 UIU/ML (ref 0.4–4)
WBC # BLD AUTO: 8.62 K/UL (ref 3.9–12.7)
WBC # BLD AUTO: 8.62 K/UL (ref 3.9–12.7)

## 2020-10-16 PROCEDURE — 86592 SYPHILIS TEST NON-TREP QUAL: CPT

## 2020-10-16 PROCEDURE — 87491 CHLMYD TRACH DNA AMP PROBE: CPT

## 2020-10-16 PROCEDURE — 86703 HIV-1/HIV-2 1 RESULT ANTBDY: CPT

## 2020-10-16 PROCEDURE — 99202 OFFICE O/P NEW SF 15 MIN: CPT | Mod: S$GLB,,, | Performed by: OBSTETRICS & GYNECOLOGY

## 2020-10-16 PROCEDURE — 85025 COMPLETE CBC W/AUTO DIFF WBC: CPT

## 2020-10-16 PROCEDURE — 3008F BODY MASS INDEX DOCD: CPT | Mod: CPTII,S$GLB,, | Performed by: OBSTETRICS & GYNECOLOGY

## 2020-10-16 PROCEDURE — 87086 URINE CULTURE/COLONY COUNT: CPT

## 2020-10-16 PROCEDURE — 86901 BLOOD TYPING SEROLOGIC RH(D): CPT

## 2020-10-16 PROCEDURE — 76817 TRANSVAGINAL US OBSTETRIC: CPT | Mod: 26,S$GLB,, | Performed by: OBSTETRICS & GYNECOLOGY

## 2020-10-16 PROCEDURE — 87340 HEPATITIS B SURFACE AG IA: CPT

## 2020-10-16 PROCEDURE — 86762 RUBELLA ANTIBODY: CPT

## 2020-10-16 PROCEDURE — 84443 ASSAY THYROID STIM HORMONE: CPT

## 2020-10-16 PROCEDURE — 3008F PR BODY MASS INDEX (BMI) DOCUMENTED: ICD-10-PCS | Mod: CPTII,S$GLB,, | Performed by: OBSTETRICS & GYNECOLOGY

## 2020-10-16 PROCEDURE — 99999 PR PBB SHADOW E&M-EST. PATIENT-LVL III: ICD-10-PCS | Mod: PBBFAC,,, | Performed by: OBSTETRICS & GYNECOLOGY

## 2020-10-16 PROCEDURE — 36415 COLL VENOUS BLD VENIPUNCTURE: CPT

## 2020-10-16 PROCEDURE — 76817 PR US, OB, TRANSVAG APPROACH: ICD-10-PCS | Mod: 26,S$GLB,, | Performed by: OBSTETRICS & GYNECOLOGY

## 2020-10-16 PROCEDURE — 99999 PR PBB SHADOW E&M-EST. PATIENT-LVL III: CPT | Mod: PBBFAC,,, | Performed by: OBSTETRICS & GYNECOLOGY

## 2020-10-16 PROCEDURE — 99202 PR OFFICE/OUTPT VISIT, NEW, LEVL II, 15-29 MIN: ICD-10-PCS | Mod: S$GLB,,, | Performed by: OBSTETRICS & GYNECOLOGY

## 2020-10-16 RX ORDER — PROMETHAZINE HYDROCHLORIDE AND DEXTROMETHORPHAN HYDROBROMIDE 6.25; 15 MG/5ML; MG/5ML
5 SYRUP ORAL EVERY 6 HOURS PRN
COMMUNITY
Start: 2020-10-13 | End: 2021-03-17 | Stop reason: SDUPTHER

## 2020-10-18 LAB — BACTERIA UR CULT: NORMAL

## 2020-10-19 ENCOUNTER — TELEPHONE (OUTPATIENT)
Dept: OBSTETRICS AND GYNECOLOGY | Facility: CLINIC | Age: 40
End: 2020-10-19

## 2020-10-19 LAB
HBV SURFACE AG SERPL QL IA: NEGATIVE
HIV 1+2 AB+HIV1 P24 AG SERPL QL IA: NEGATIVE
RUBV IGG SER-ACNC: 175 IU/ML
RUBV IGG SER-IMP: REACTIVE

## 2020-10-23 ENCOUNTER — OFFICE VISIT (OUTPATIENT)
Dept: OPTOMETRY | Facility: CLINIC | Age: 40
End: 2020-10-23
Payer: COMMERCIAL

## 2020-10-23 DIAGNOSIS — R51.9 HEADACHE ABOVE THE EYE REGION: Primary | ICD-10-CM

## 2020-10-23 DIAGNOSIS — H02.88B MEIBOMIAN GLAND DYSFUNCTION (MGD), BILATERAL, BOTH UPPER AND LOWER LIDS: ICD-10-CM

## 2020-10-23 DIAGNOSIS — H52.203 HYPEROPIC ASTIGMATISM, BILATERAL: ICD-10-CM

## 2020-10-23 DIAGNOSIS — H02.88A MEIBOMIAN GLAND DYSFUNCTION (MGD), BILATERAL, BOTH UPPER AND LOWER LIDS: ICD-10-CM

## 2020-10-23 PROCEDURE — 92014 COMPRE OPH EXAM EST PT 1/>: CPT | Mod: S$GLB,,, | Performed by: OPTOMETRIST

## 2020-10-23 PROCEDURE — 99999 PR PBB SHADOW E&M-EST. PATIENT-LVL II: ICD-10-PCS | Mod: PBBFAC,,, | Performed by: OPTOMETRIST

## 2020-10-23 PROCEDURE — 92014 PR EYE EXAM, EST PATIENT,COMPREHESV: ICD-10-PCS | Mod: S$GLB,,, | Performed by: OPTOMETRIST

## 2020-10-23 PROCEDURE — 92015 PR REFRACTION: ICD-10-PCS | Mod: S$GLB,,, | Performed by: OPTOMETRIST

## 2020-10-23 PROCEDURE — 99999 PR PBB SHADOW E&M-EST. PATIENT-LVL II: CPT | Mod: PBBFAC,,, | Performed by: OPTOMETRIST

## 2020-10-23 PROCEDURE — 92015 DETERMINE REFRACTIVE STATE: CPT | Mod: S$GLB,,, | Performed by: OPTOMETRIST

## 2020-10-23 NOTE — PROGRESS NOTES
HPI     Last eye exam was 12/12/19 with Dr. Morrow.  Patient states near vision is blurry without glasses. No distance vision   complaints. Occasionally gets headaches.   Patient denies diplopia, headaches, flashes/floaters, and pain.      Last edited by Anita Samuel on 10/23/2020  9:02 AM. (History)            Assessment /Plan     For exam results, see Encounter Report.    Headache above the eye region  -Uncorrected Hyperopia  -recommend sRx    Meibomian gland dysfunction (MGD), bilateral, both upper and lower lids  -Systane PRN    Hyperopic astigmatism, bilateral  Eyeglass Final Rx     Eyeglass Final Rx       Sphere Cylinder Axis    Right +0.50 +0.75 105    Left +0.75 +0.75 070    Type: SVL    Expiration Date: 10/24/2021                  RTC 1 yr

## 2020-10-27 NOTE — PROGRESS NOTES
Chief Complaint: Absence of Menses     HPI:      Daya Limon is a 40 y.o.  who presents complaining of absence of menses.  She reports no pain or vaginal bleeding. Ultrasound reviewed and iup noted with small fetus but absent heartbeat .  Discussed repeating ultrasound in one week.  Discussed precautions of a sab.  No current pain or vaginal bleeding.  Discussed lifestyle and vitamin use.  Discussed previous deliveries     Past Medical History:   Diagnosis Date    Body mass index (BMI) 24.0-24.9, adult 2016    GERD (gastroesophageal reflux disease) 2017    Meibomian gland dysfunction (MGD)     Thyroid nodule     Vitamin D deficiency 2017     Past Surgical History:   Procedure Laterality Date    VAGINAL DELIVERY      X2     Social History     Tobacco Use    Smoking status: Never Smoker    Smokeless tobacco: Never Used   Substance Use Topics    Alcohol use: No    Drug use: No     Family History   Problem Relation Age of Onset    Diabetes Mother     Diabetes Father     Kidney disease Father     Kidney failure Father     Cataracts Father     No Known Problems Sister     No Known Problems Brother     No Known Problems Maternal Aunt     No Known Problems Maternal Uncle     No Known Problems Paternal Aunt     No Known Problems Paternal Uncle     No Known Problems Maternal Grandmother     No Known Problems Maternal Grandfather     No Known Problems Paternal Grandmother     No Known Problems Paternal Grandfather     Breast cancer Neg Hx     Colon cancer Neg Hx     Ovarian cancer Neg Hx     Hypertension Neg Hx     Stroke Neg Hx     Amblyopia Neg Hx     Blindness Neg Hx     Cancer Neg Hx     Glaucoma Neg Hx     Macular degeneration Neg Hx     Retinal detachment Neg Hx     Strabismus Neg Hx     Thyroid disease Neg Hx     Thyroid nodules Neg Hx     Thyroid cancer Neg Hx      OB History    Para Term  AB Living   4 3 3     3   SAB TAB Ectopic Multiple Live  "Births         0 3      # Outcome Date GA Lbr Azeem/2nd Weight Sex Delivery Anes PTL Lv   4 Current            3 Term 10/17/14 40w5d  3.855 kg (8 lb 8 oz) F Vag-Spont EPI N [place]   2 Term 09 40w0d  3.402 kg (7 lb 8 oz) F Vag-Spont EPI N [place]   1 Term 07 40w0d  3.459 kg (7 lb 10 oz) F Vag-Spont EPI N [place]       ROS:     GENERAL: Denies weight gain or weight loss. Feeling well overall.   SKIN: Denies rash or lesions.   BREASTS: Denies lumps or nipple discharge. + tenderness.  ABDOMEN: Denies abdominal pain, constipation, diarrhea. no vomiting  URINARY: Denies dysuria, hematuria. + frequency.  NEUROLOGIC: Denies syncope or weakness.   PSYCHIATRIC: Denies depression, anxiety or mood swings.    Physical Exam:      PHYSICAL EXAM:  /70   Ht 5' 1" (1.549 m)   LMP 2020   BMI 25.16 kg/m²   Body mass index is 25.16 kg/m².     APPEARANCE: Well nourished, well developed, in no acute distress.  PSYCH: Appropriate mood and affect.  EXTREMITIES: No edema.    Assessment/Plan:       ICD-10-CM ICD-9-CM    1. Pregnancy, unspecified gestational age  Z34.90 V22.2 HIV-1 and HIV-2 antibodies      RPR      Hepatitis B surface antigen      Type & Screen      Rubella antibody, IgG      Urine culture      CBC auto differential      US MFM Procedure ([facility])      TSH      C. trachomatis/N. gonorrhoeae by AMP DNA      CBC auto differential      US OB/GYN Procedure ([facility]) - Extended List - Future      CANCELED: Type & Screen - Ob Profile   2. Absent menses  N91.2 626.0 POCT urine pregnancy       No follow-ups on file.    Counselin. Patient was counseled today on proper weight gain based on the [facility]'s recommendations based on her pre-pregnancy weight.   2. Discussed the need for a repeat ultrasound   3. Discussed prenatal vitamin options (i.e. stool softener, DHA).   4. Optional genetic testing discussed.   5. Safety of exercise discussed with patient, and continued active lifestyle " encouraged.  6. corona precautions for both patient and her spouse.  7. Normal course of prenatal care reviewed.  8. Medications safe in pregnancy discussed and list provided.    20 minutes of face-to-face discussion occurred during today's visit.     Use of the [facility] Patient Portal discussed and encouraged during today's visit.     One week follow up

## 2020-11-02 ENCOUNTER — PATIENT MESSAGE (OUTPATIENT)
Dept: MATERNAL FETAL MEDICINE | Facility: CLINIC | Age: 40
End: 2020-11-02

## 2020-11-02 ENCOUNTER — TELEPHONE (OUTPATIENT)
Dept: OBSTETRICS AND GYNECOLOGY | Facility: CLINIC | Age: 40
End: 2020-11-02

## 2020-11-02 NOTE — TELEPHONE ENCOUNTER
I spoke with patient , She can come In on Friday. I offered appointment tomorrow but patient can not come in due to . ED precautions given again.

## 2020-11-06 ENCOUNTER — PROCEDURE VISIT (OUTPATIENT)
Dept: OBSTETRICS AND GYNECOLOGY | Facility: CLINIC | Age: 40
End: 2020-11-06
Attending: OBSTETRICS & GYNECOLOGY
Payer: COMMERCIAL

## 2020-11-06 VITALS
BODY MASS INDEX: 25.16 KG/M2 | SYSTOLIC BLOOD PRESSURE: 100 MMHG | DIASTOLIC BLOOD PRESSURE: 64 MMHG | WEIGHT: 133.19 LBS

## 2020-11-06 DIAGNOSIS — O03.9 SAB (SPONTANEOUS ABORTION): Primary | ICD-10-CM

## 2020-11-06 DIAGNOSIS — O02.1 MISSED ABORTION: ICD-10-CM

## 2020-11-06 DIAGNOSIS — Z34.90 PREGNANCY, UNSPECIFIED GESTATIONAL AGE: ICD-10-CM

## 2020-11-06 PROCEDURE — 99999 PR PBB SHADOW E&M-EST. PATIENT-LVL III: CPT | Mod: PBBFAC,,, | Performed by: OBSTETRICS & GYNECOLOGY

## 2020-11-06 PROCEDURE — 3008F PR BODY MASS INDEX (BMI) DOCUMENTED: ICD-10-PCS | Mod: CPTII,S$GLB,, | Performed by: OBSTETRICS & GYNECOLOGY

## 2020-11-06 PROCEDURE — 76817 PR US, OB, TRANSVAG APPROACH: ICD-10-PCS | Mod: S$GLB,,, | Performed by: OBSTETRICS & GYNECOLOGY

## 2020-11-06 PROCEDURE — 99213 PR OFFICE/OUTPT VISIT, EST, LEVL III, 20-29 MIN: ICD-10-PCS | Mod: S$GLB,,, | Performed by: OBSTETRICS & GYNECOLOGY

## 2020-11-06 PROCEDURE — 99999 PR PBB SHADOW E&M-EST. PATIENT-LVL III: ICD-10-PCS | Mod: PBBFAC,,, | Performed by: OBSTETRICS & GYNECOLOGY

## 2020-11-06 PROCEDURE — 99213 OFFICE O/P EST LOW 20 MIN: CPT | Mod: S$GLB,,, | Performed by: OBSTETRICS & GYNECOLOGY

## 2020-11-06 PROCEDURE — 3008F BODY MASS INDEX DOCD: CPT | Mod: CPTII,S$GLB,, | Performed by: OBSTETRICS & GYNECOLOGY

## 2020-11-06 PROCEDURE — 76817 TRANSVAGINAL US OBSTETRIC: CPT | Mod: S$GLB,,, | Performed by: OBSTETRICS & GYNECOLOGY

## 2020-11-06 RX ORDER — MISOPROSTOL 200 UG/1
200 TABLET ORAL
Qty: 8 TABLET | Refills: 0 | Status: SHIPPED | OUTPATIENT
Start: 2020-11-09 | End: 2020-11-11 | Stop reason: SDUPTHER

## 2020-11-06 RX ORDER — OXYCODONE AND ACETAMINOPHEN 5; 325 MG/1; MG/1
1 TABLET ORAL EVERY 4 HOURS PRN
Qty: 5 TABLET | Refills: 0 | Status: SHIPPED | OUTPATIENT
Start: 2020-11-06 | End: 2021-03-10 | Stop reason: ALTCHOICE

## 2020-11-06 NOTE — PROGRESS NOTES
sab noted   Ultrasound reviewed no spotting and intermittent cramping.    Discussed all options for management and recommend Cytotec or D and c   Patient not interested in surgical management discussed if medical management does not work we will need a surgical intervention  No current bleeding or pain   RH + not anemic  15 minute counseling appointment

## 2020-11-11 ENCOUNTER — ROUTINE PRENATAL (OUTPATIENT)
Dept: OBSTETRICS AND GYNECOLOGY | Facility: CLINIC | Age: 40
End: 2020-11-11
Attending: OBSTETRICS & GYNECOLOGY
Payer: COMMERCIAL

## 2020-11-11 ENCOUNTER — LAB VISIT (OUTPATIENT)
Dept: LAB | Facility: OTHER | Age: 40
End: 2020-11-11
Attending: OBSTETRICS & GYNECOLOGY
Payer: COMMERCIAL

## 2020-11-11 VITALS
WEIGHT: 138.75 LBS | SYSTOLIC BLOOD PRESSURE: 100 MMHG | BODY MASS INDEX: 26.22 KG/M2 | DIASTOLIC BLOOD PRESSURE: 76 MMHG

## 2020-11-11 DIAGNOSIS — O03.9 SAB (SPONTANEOUS ABORTION): Primary | ICD-10-CM

## 2020-11-11 DIAGNOSIS — O03.9 SAB (SPONTANEOUS ABORTION): ICD-10-CM

## 2020-11-11 LAB
BASOPHILS # BLD AUTO: 0.05 K/UL (ref 0–0.2)
BASOPHILS NFR BLD: 0.6 % (ref 0–1.9)
DIFFERENTIAL METHOD: ABNORMAL
EOSINOPHIL # BLD AUTO: 0.4 K/UL (ref 0–0.5)
EOSINOPHIL NFR BLD: 5.5 % (ref 0–8)
ERYTHROCYTE [DISTWIDTH] IN BLOOD BY AUTOMATED COUNT: 13.6 % (ref 11.5–14.5)
HCG INTACT+B SERPL-ACNC: 217 MIU/ML
HCT VFR BLD AUTO: 42.4 % (ref 37–48.5)
HGB BLD-MCNC: 13.4 G/DL (ref 12–16)
IMM GRANULOCYTES # BLD AUTO: 0.01 K/UL (ref 0–0.04)
IMM GRANULOCYTES NFR BLD AUTO: 0.1 % (ref 0–0.5)
LYMPHOCYTES # BLD AUTO: 2.7 K/UL (ref 1–4.8)
LYMPHOCYTES NFR BLD: 33.6 % (ref 18–48)
MCH RBC QN AUTO: 27.9 PG (ref 27–31)
MCHC RBC AUTO-ENTMCNC: 31.6 G/DL (ref 32–36)
MCV RBC AUTO: 88 FL (ref 82–98)
MONOCYTES # BLD AUTO: 0.5 K/UL (ref 0.3–1)
MONOCYTES NFR BLD: 6.4 % (ref 4–15)
NEUTROPHILS # BLD AUTO: 4.3 K/UL (ref 1.8–7.7)
NEUTROPHILS NFR BLD: 53.8 % (ref 38–73)
NRBC BLD-RTO: 0 /100 WBC
PLATELET # BLD AUTO: 243 K/UL (ref 150–350)
PMV BLD AUTO: 11.7 FL (ref 9.2–12.9)
RBC # BLD AUTO: 4.8 M/UL (ref 4–5.4)
WBC # BLD AUTO: 7.97 K/UL (ref 3.9–12.7)

## 2020-11-11 PROCEDURE — 76817 PR US, OB, TRANSVAG APPROACH: ICD-10-PCS | Mod: S$GLB,,, | Performed by: OBSTETRICS & GYNECOLOGY

## 2020-11-11 PROCEDURE — 99213 PR OFFICE/OUTPT VISIT, EST, LEVL III, 20-29 MIN: ICD-10-PCS | Mod: S$GLB,,, | Performed by: OBSTETRICS & GYNECOLOGY

## 2020-11-11 PROCEDURE — 76817 TRANSVAGINAL US OBSTETRIC: CPT | Mod: S$GLB,,, | Performed by: OBSTETRICS & GYNECOLOGY

## 2020-11-11 PROCEDURE — 85025 COMPLETE CBC W/AUTO DIFF WBC: CPT

## 2020-11-11 PROCEDURE — 36415 COLL VENOUS BLD VENIPUNCTURE: CPT

## 2020-11-11 PROCEDURE — 99213 OFFICE O/P EST LOW 20 MIN: CPT | Mod: S$GLB,,, | Performed by: OBSTETRICS & GYNECOLOGY

## 2020-11-11 PROCEDURE — 99999 PR PBB SHADOW E&M-EST. PATIENT-LVL III: CPT | Mod: PBBFAC,,, | Performed by: OBSTETRICS & GYNECOLOGY

## 2020-11-11 PROCEDURE — 84702 CHORIONIC GONADOTROPIN TEST: CPT

## 2020-11-11 PROCEDURE — 99999 PR PBB SHADOW E&M-EST. PATIENT-LVL III: ICD-10-PCS | Mod: PBBFAC,,, | Performed by: OBSTETRICS & GYNECOLOGY

## 2020-11-11 RX ORDER — MISOPROSTOL 200 UG/1
200 TABLET ORAL
Qty: 8 TABLET | Refills: 0 | Status: SHIPPED | OUTPATIENT
Start: 2020-11-12 | End: 2021-03-10 | Stop reason: ALTCHOICE

## 2020-11-12 ENCOUNTER — PATIENT MESSAGE (OUTPATIENT)
Dept: OBSTETRICS AND GYNECOLOGY | Facility: CLINIC | Age: 40
End: 2020-11-12

## 2020-11-12 NOTE — PROGRESS NOTES
sab treated with cytotec times two doses   Reports cramping and passing clots the bleeding has since slowed to spotting and pain is gone.  Ultrasound reviewed and gestational sac is gone the lining is 8-9 mm with small amount of blood flow noted.  Findings reviewed with patient and partner and we will trend b hcg and take cytotec tonight.  Patient really hoping for medical management.  Discussed repeating ultrasound to make sure lining is thin.  No current pain cervix soft but closed  15 minute discussion about options declined d and c

## 2021-01-04 ENCOUNTER — PATIENT MESSAGE (OUTPATIENT)
Dept: ADMINISTRATIVE | Facility: HOSPITAL | Age: 41
End: 2021-01-04

## 2021-03-10 ENCOUNTER — OFFICE VISIT (OUTPATIENT)
Dept: FAMILY MEDICINE | Facility: CLINIC | Age: 41
End: 2021-03-10
Payer: COMMERCIAL

## 2021-03-10 VITALS
BODY MASS INDEX: 25.81 KG/M2 | OXYGEN SATURATION: 99 % | WEIGHT: 136.69 LBS | HEIGHT: 61 IN | TEMPERATURE: 99 F | DIASTOLIC BLOOD PRESSURE: 69 MMHG | HEART RATE: 72 BPM | SYSTOLIC BLOOD PRESSURE: 98 MMHG

## 2021-03-10 DIAGNOSIS — R09.82 POST-NASAL DRAINAGE: ICD-10-CM

## 2021-03-10 DIAGNOSIS — E04.1 RIGHT THYROID NODULE: ICD-10-CM

## 2021-03-10 DIAGNOSIS — Z79.899 ENCOUNTER FOR MONITORING LONG-TERM PROTON PUMP INHIBITOR THERAPY: ICD-10-CM

## 2021-03-10 DIAGNOSIS — R10.13 EPIGASTRIC PAIN: ICD-10-CM

## 2021-03-10 DIAGNOSIS — Z79.899 MEDICATION MANAGEMENT: ICD-10-CM

## 2021-03-10 DIAGNOSIS — R05.3 CHRONIC COUGH: Primary | ICD-10-CM

## 2021-03-10 DIAGNOSIS — Z13.1 DIABETES MELLITUS SCREENING: ICD-10-CM

## 2021-03-10 DIAGNOSIS — L08.9 INFECTED CYST OF SKIN: ICD-10-CM

## 2021-03-10 DIAGNOSIS — Z13.220 SCREENING, LIPID: ICD-10-CM

## 2021-03-10 DIAGNOSIS — L72.0 EPIDERMOID CYST OF SKIN OF CHEST: ICD-10-CM

## 2021-03-10 DIAGNOSIS — G89.29 CHRONIC BILATERAL LOW BACK PAIN WITHOUT SCIATICA: ICD-10-CM

## 2021-03-10 DIAGNOSIS — J30.89 NON-SEASONAL ALLERGIC RHINITIS, UNSPECIFIED TRIGGER: ICD-10-CM

## 2021-03-10 DIAGNOSIS — E55.9 VITAMIN D DEFICIENCY: ICD-10-CM

## 2021-03-10 DIAGNOSIS — N92.1 MENORRHAGIA WITH IRREGULAR CYCLE: ICD-10-CM

## 2021-03-10 DIAGNOSIS — M54.50 CHRONIC BILATERAL LOW BACK PAIN WITHOUT SCIATICA: ICD-10-CM

## 2021-03-10 DIAGNOSIS — Z01.84 ENCOUNTER FOR ANTIBODY RESPONSE EXAMINATION: ICD-10-CM

## 2021-03-10 DIAGNOSIS — L72.9 INFECTED CYST OF SKIN: ICD-10-CM

## 2021-03-10 DIAGNOSIS — K21.9 GASTROESOPHAGEAL REFLUX DISEASE, UNSPECIFIED WHETHER ESOPHAGITIS PRESENT: ICD-10-CM

## 2021-03-10 DIAGNOSIS — Z51.81 ENCOUNTER FOR MONITORING LONG-TERM PROTON PUMP INHIBITOR THERAPY: ICD-10-CM

## 2021-03-10 PROCEDURE — 99214 PR OFFICE/OUTPT VISIT, EST, LEVL IV, 30-39 MIN: ICD-10-PCS | Mod: S$GLB,CS,, | Performed by: FAMILY MEDICINE

## 2021-03-10 PROCEDURE — 3008F PR BODY MASS INDEX (BMI) DOCUMENTED: ICD-10-PCS | Mod: CPTII,S$GLB,, | Performed by: FAMILY MEDICINE

## 2021-03-10 PROCEDURE — 99999 PR PBB SHADOW E&M-EST. PATIENT-LVL IV: ICD-10-PCS | Mod: PBBFAC,,, | Performed by: FAMILY MEDICINE

## 2021-03-10 PROCEDURE — 99214 OFFICE O/P EST MOD 30 MIN: CPT | Mod: S$GLB,CS,, | Performed by: FAMILY MEDICINE

## 2021-03-10 PROCEDURE — 3008F BODY MASS INDEX DOCD: CPT | Mod: CPTII,S$GLB,, | Performed by: FAMILY MEDICINE

## 2021-03-10 PROCEDURE — 99999 PR PBB SHADOW E&M-EST. PATIENT-LVL IV: CPT | Mod: PBBFAC,,, | Performed by: FAMILY MEDICINE

## 2021-03-10 RX ORDER — PANTOPRAZOLE SODIUM 40 MG/1
40 TABLET, DELAYED RELEASE ORAL
Qty: 60 TABLET | Refills: 11 | Status: SHIPPED | OUTPATIENT
Start: 2021-03-10 | End: 2022-10-21 | Stop reason: SDUPTHER

## 2021-03-10 RX ORDER — LEVOCETIRIZINE DIHYDROCHLORIDE 5 MG/1
5 TABLET, FILM COATED ORAL NIGHTLY
Qty: 90 TABLET | Refills: 4 | Status: SHIPPED | OUTPATIENT
Start: 2021-03-10 | End: 2021-03-17 | Stop reason: SDUPTHER

## 2021-03-11 ENCOUNTER — LAB VISIT (OUTPATIENT)
Dept: LAB | Facility: HOSPITAL | Age: 41
End: 2021-03-11
Attending: FAMILY MEDICINE
Payer: COMMERCIAL

## 2021-03-11 DIAGNOSIS — Z13.1 DIABETES MELLITUS SCREENING: ICD-10-CM

## 2021-03-11 DIAGNOSIS — E04.1 RIGHT THYROID NODULE: ICD-10-CM

## 2021-03-11 DIAGNOSIS — E55.9 VITAMIN D DEFICIENCY: ICD-10-CM

## 2021-03-11 DIAGNOSIS — R10.13 EPIGASTRIC PAIN: ICD-10-CM

## 2021-03-11 DIAGNOSIS — Z51.81 ENCOUNTER FOR MONITORING LONG-TERM PROTON PUMP INHIBITOR THERAPY: ICD-10-CM

## 2021-03-11 DIAGNOSIS — Z01.84 ENCOUNTER FOR ANTIBODY RESPONSE EXAMINATION: ICD-10-CM

## 2021-03-11 DIAGNOSIS — Z79.899 MEDICATION MANAGEMENT: ICD-10-CM

## 2021-03-11 DIAGNOSIS — Z79.899 ENCOUNTER FOR MONITORING LONG-TERM PROTON PUMP INHIBITOR THERAPY: ICD-10-CM

## 2021-03-11 DIAGNOSIS — N92.1 MENORRHAGIA WITH IRREGULAR CYCLE: ICD-10-CM

## 2021-03-11 DIAGNOSIS — Z13.220 SCREENING, LIPID: ICD-10-CM

## 2021-03-11 PROBLEM — R10.9 ABDOMINAL PAIN: Status: ACTIVE | Noted: 2021-03-11

## 2021-03-11 LAB
ALBUMIN SERPL BCP-MCNC: 4.1 G/DL (ref 3.5–5.2)
ALP SERPL-CCNC: 65 U/L (ref 55–135)
ALT SERPL W/O P-5'-P-CCNC: 20 U/L (ref 10–44)
ANION GAP SERPL CALC-SCNC: 7 MMOL/L (ref 8–16)
AST SERPL-CCNC: 22 U/L (ref 10–40)
BASOPHILS # BLD AUTO: 0.04 K/UL (ref 0–0.2)
BASOPHILS NFR BLD: 0.5 % (ref 0–1.9)
BILIRUB SERPL-MCNC: 0.5 MG/DL (ref 0.1–1)
BUN SERPL-MCNC: 12 MG/DL (ref 6–20)
CALCIUM SERPL-MCNC: 9.4 MG/DL (ref 8.7–10.5)
CHLORIDE SERPL-SCNC: 109 MMOL/L (ref 95–110)
CHOLEST SERPL-MCNC: 176 MG/DL (ref 120–199)
CHOLEST/HDLC SERPL: 3.2 {RATIO} (ref 2–5)
CO2 SERPL-SCNC: 26 MMOL/L (ref 23–29)
CREAT SERPL-MCNC: 0.6 MG/DL (ref 0.5–1.4)
DIFFERENTIAL METHOD: ABNORMAL
EOSINOPHIL # BLD AUTO: 0.3 K/UL (ref 0–0.5)
EOSINOPHIL NFR BLD: 4.1 % (ref 0–8)
ERYTHROCYTE [DISTWIDTH] IN BLOOD BY AUTOMATED COUNT: 13.8 % (ref 11.5–14.5)
EST. GFR  (AFRICAN AMERICAN): >60 ML/MIN/1.73 M^2
EST. GFR  (NON AFRICAN AMERICAN): >60 ML/MIN/1.73 M^2
FERRITIN SERPL-MCNC: 37 NG/ML (ref 20–300)
GLUCOSE SERPL-MCNC: 90 MG/DL (ref 70–110)
HCT VFR BLD AUTO: 43.3 % (ref 37–48.5)
HDLC SERPL-MCNC: 55 MG/DL (ref 40–75)
HDLC SERPL: 31.3 % (ref 20–50)
HGB BLD-MCNC: 13.7 G/DL (ref 12–16)
IMM GRANULOCYTES # BLD AUTO: 0.01 K/UL (ref 0–0.04)
IMM GRANULOCYTES NFR BLD AUTO: 0.1 % (ref 0–0.5)
IRON SERPL-MCNC: 80 UG/DL (ref 30–160)
LDLC SERPL CALC-MCNC: 107 MG/DL (ref 63–159)
LYMPHOCYTES # BLD AUTO: 2.8 K/UL (ref 1–4.8)
LYMPHOCYTES NFR BLD: 37.2 % (ref 18–48)
MAGNESIUM SERPL-MCNC: 1.9 MG/DL (ref 1.6–2.6)
MCH RBC QN AUTO: 27.8 PG (ref 27–31)
MCHC RBC AUTO-ENTMCNC: 31.6 G/DL (ref 32–36)
MCV RBC AUTO: 88 FL (ref 82–98)
MONOCYTES # BLD AUTO: 0.5 K/UL (ref 0.3–1)
MONOCYTES NFR BLD: 6 % (ref 4–15)
NEUTROPHILS # BLD AUTO: 3.9 K/UL (ref 1.8–7.7)
NEUTROPHILS NFR BLD: 52.1 % (ref 38–73)
NONHDLC SERPL-MCNC: 121 MG/DL
NRBC BLD-RTO: 0 /100 WBC
PLATELET # BLD AUTO: 232 K/UL (ref 150–350)
PMV BLD AUTO: 12.1 FL (ref 9.2–12.9)
POTASSIUM SERPL-SCNC: 4.1 MMOL/L (ref 3.5–5.1)
PROT SERPL-MCNC: 7.4 G/DL (ref 6–8.4)
RBC # BLD AUTO: 4.93 M/UL (ref 4–5.4)
SATURATED IRON: 19 % (ref 20–50)
SODIUM SERPL-SCNC: 142 MMOL/L (ref 136–145)
TOTAL IRON BINDING CAPACITY: 426 UG/DL (ref 250–450)
TRANSFERRIN SERPL-MCNC: 288 MG/DL (ref 200–375)
TRIGL SERPL-MCNC: 70 MG/DL (ref 30–150)
TSH SERPL DL<=0.005 MIU/L-ACNC: 2.95 UIU/ML (ref 0.4–4)
WBC # BLD AUTO: 7.49 K/UL (ref 3.9–12.7)

## 2021-03-11 PROCEDURE — 86769 SARS-COV-2 COVID-19 ANTIBODY: CPT | Performed by: FAMILY MEDICINE

## 2021-03-11 PROCEDURE — 83690 ASSAY OF LIPASE: CPT | Performed by: FAMILY MEDICINE

## 2021-03-11 PROCEDURE — 85025 COMPLETE CBC W/AUTO DIFF WBC: CPT | Performed by: FAMILY MEDICINE

## 2021-03-11 PROCEDURE — 80061 LIPID PANEL: CPT | Performed by: FAMILY MEDICINE

## 2021-03-11 PROCEDURE — 82728 ASSAY OF FERRITIN: CPT | Performed by: FAMILY MEDICINE

## 2021-03-11 PROCEDURE — 83540 ASSAY OF IRON: CPT | Performed by: FAMILY MEDICINE

## 2021-03-11 PROCEDURE — 83036 HEMOGLOBIN GLYCOSYLATED A1C: CPT | Performed by: FAMILY MEDICINE

## 2021-03-11 PROCEDURE — 36415 COLL VENOUS BLD VENIPUNCTURE: CPT | Mod: PO | Performed by: FAMILY MEDICINE

## 2021-03-11 PROCEDURE — 82306 VITAMIN D 25 HYDROXY: CPT | Performed by: FAMILY MEDICINE

## 2021-03-11 PROCEDURE — 82607 VITAMIN B-12: CPT | Performed by: FAMILY MEDICINE

## 2021-03-11 PROCEDURE — 83735 ASSAY OF MAGNESIUM: CPT | Performed by: FAMILY MEDICINE

## 2021-03-11 PROCEDURE — 84443 ASSAY THYROID STIM HORMONE: CPT | Performed by: FAMILY MEDICINE

## 2021-03-11 PROCEDURE — 80053 COMPREHEN METABOLIC PANEL: CPT | Performed by: FAMILY MEDICINE

## 2021-03-12 LAB
25(OH)D3+25(OH)D2 SERPL-MCNC: 28 NG/ML (ref 30–96)
ESTIMATED AVG GLUCOSE: 100 MG/DL (ref 68–131)
HBA1C MFR BLD: 5.1 % (ref 4–5.6)
LIPASE SERPL-CCNC: 21 U/L (ref 4–60)
SARS-COV-2 IGG SERPLBLD QL IA.RAPID: NEGATIVE
VIT B12 SERPL-MCNC: 460 PG/ML (ref 210–950)

## 2021-03-17 ENCOUNTER — TELEPHONE (OUTPATIENT)
Dept: FAMILY MEDICINE | Facility: CLINIC | Age: 41
End: 2021-03-17

## 2021-03-17 ENCOUNTER — PATIENT OUTREACH (OUTPATIENT)
Dept: ADMINISTRATIVE | Facility: OTHER | Age: 41
End: 2021-03-17

## 2021-03-17 DIAGNOSIS — J30.89 NON-SEASONAL ALLERGIC RHINITIS, UNSPECIFIED TRIGGER: ICD-10-CM

## 2021-03-17 DIAGNOSIS — R09.82 POST-NASAL DRAINAGE: ICD-10-CM

## 2021-03-17 DIAGNOSIS — R05.3 CHRONIC COUGH: Primary | ICD-10-CM

## 2021-03-17 RX ORDER — LEVOCETIRIZINE DIHYDROCHLORIDE 5 MG/1
5 TABLET, FILM COATED ORAL NIGHTLY
Qty: 90 TABLET | Refills: 4 | Status: SHIPPED | OUTPATIENT
Start: 2021-03-17 | End: 2021-04-27

## 2021-03-17 RX ORDER — PROMETHAZINE HYDROCHLORIDE AND DEXTROMETHORPHAN HYDROBROMIDE 6.25; 15 MG/5ML; MG/5ML
5 SYRUP ORAL EVERY 6 HOURS PRN
Qty: 240 ML | Refills: 0 | Status: SHIPPED | OUTPATIENT
Start: 2021-03-17 | End: 2021-03-17 | Stop reason: SDUPTHER

## 2021-03-17 RX ORDER — PROMETHAZINE HYDROCHLORIDE AND DEXTROMETHORPHAN HYDROBROMIDE 6.25; 15 MG/5ML; MG/5ML
5 SYRUP ORAL EVERY 6 HOURS PRN
Qty: 240 ML | Refills: 0 | Status: SHIPPED | OUTPATIENT
Start: 2021-03-17 | End: 2021-05-07 | Stop reason: ALTCHOICE

## 2021-03-19 ENCOUNTER — IMMUNIZATION (OUTPATIENT)
Dept: FAMILY MEDICINE | Facility: CLINIC | Age: 41
End: 2021-03-19
Payer: COMMERCIAL

## 2021-03-19 DIAGNOSIS — Z23 NEED FOR VACCINATION: Primary | ICD-10-CM

## 2021-03-19 PROCEDURE — 91300 COVID-19, MRNA, LNP-S, PF, 30 MCG/0.3 ML DOSE VACCINE: CPT | Mod: PBBFAC | Performed by: FAMILY MEDICINE

## 2021-04-01 ENCOUNTER — PATIENT MESSAGE (OUTPATIENT)
Dept: FAMILY MEDICINE | Facility: CLINIC | Age: 41
End: 2021-04-01

## 2021-04-01 ENCOUNTER — PATIENT MESSAGE (OUTPATIENT)
Dept: ADMINISTRATIVE | Facility: HOSPITAL | Age: 41
End: 2021-04-01

## 2021-04-01 ENCOUNTER — TELEPHONE (OUTPATIENT)
Dept: FAMILY MEDICINE | Facility: CLINIC | Age: 41
End: 2021-04-01

## 2021-04-01 DIAGNOSIS — L02.219 ABSCESS, TRUNK: Primary | ICD-10-CM

## 2021-04-01 RX ORDER — DOXYCYCLINE 100 MG/1
100 CAPSULE ORAL 2 TIMES DAILY
Qty: 14 CAPSULE | Refills: 0 | Status: SHIPPED | OUTPATIENT
Start: 2021-04-01 | End: 2021-04-08

## 2021-04-06 ENCOUNTER — OFFICE VISIT (OUTPATIENT)
Dept: ALLERGY | Facility: CLINIC | Age: 41
End: 2021-04-06
Payer: COMMERCIAL

## 2021-04-06 ENCOUNTER — LAB VISIT (OUTPATIENT)
Dept: LAB | Facility: HOSPITAL | Age: 41
End: 2021-04-06
Attending: ALLERGY & IMMUNOLOGY
Payer: COMMERCIAL

## 2021-04-06 VITALS — BODY MASS INDEX: 26.89 KG/M2 | WEIGHT: 142.44 LBS | HEIGHT: 61 IN

## 2021-04-06 DIAGNOSIS — J31.0 CHRONIC RHINITIS: Primary | ICD-10-CM

## 2021-04-06 DIAGNOSIS — R05.3 CHRONIC COUGH: ICD-10-CM

## 2021-04-06 DIAGNOSIS — J31.0 CHRONIC RHINITIS: ICD-10-CM

## 2021-04-06 PROCEDURE — 3008F PR BODY MASS INDEX (BMI) DOCUMENTED: ICD-10-PCS | Mod: CPTII,S$GLB,, | Performed by: ALLERGY & IMMUNOLOGY

## 2021-04-06 PROCEDURE — 1126F AMNT PAIN NOTED NONE PRSNT: CPT | Mod: S$GLB,,, | Performed by: ALLERGY & IMMUNOLOGY

## 2021-04-06 PROCEDURE — 3008F BODY MASS INDEX DOCD: CPT | Mod: CPTII,S$GLB,, | Performed by: ALLERGY & IMMUNOLOGY

## 2021-04-06 PROCEDURE — 99204 PR OFFICE/OUTPT VISIT, NEW, LEVL IV, 45-59 MIN: ICD-10-PCS | Mod: S$GLB,,, | Performed by: ALLERGY & IMMUNOLOGY

## 2021-04-06 PROCEDURE — 1126F PR PAIN SEVERITY QUANTIFIED, NO PAIN PRESENT: ICD-10-PCS | Mod: S$GLB,,, | Performed by: ALLERGY & IMMUNOLOGY

## 2021-04-06 PROCEDURE — 99204 OFFICE O/P NEW MOD 45 MIN: CPT | Mod: S$GLB,,, | Performed by: ALLERGY & IMMUNOLOGY

## 2021-04-06 PROCEDURE — 99999 PR PBB SHADOW E&M-EST. PATIENT-LVL III: ICD-10-PCS | Mod: PBBFAC,,, | Performed by: ALLERGY & IMMUNOLOGY

## 2021-04-06 PROCEDURE — 86003 ALLG SPEC IGE CRUDE XTRC EA: CPT | Mod: 59 | Performed by: ALLERGY & IMMUNOLOGY

## 2021-04-06 PROCEDURE — 36415 COLL VENOUS BLD VENIPUNCTURE: CPT | Mod: PO | Performed by: ALLERGY & IMMUNOLOGY

## 2021-04-06 PROCEDURE — 86003 ALLG SPEC IGE CRUDE XTRC EA: CPT | Performed by: ALLERGY & IMMUNOLOGY

## 2021-04-06 PROCEDURE — 99999 PR PBB SHADOW E&M-EST. PATIENT-LVL III: CPT | Mod: PBBFAC,,, | Performed by: ALLERGY & IMMUNOLOGY

## 2021-04-09 ENCOUNTER — IMMUNIZATION (OUTPATIENT)
Dept: FAMILY MEDICINE | Facility: CLINIC | Age: 41
End: 2021-04-09
Payer: COMMERCIAL

## 2021-04-09 ENCOUNTER — PATIENT MESSAGE (OUTPATIENT)
Dept: FAMILY MEDICINE | Facility: CLINIC | Age: 41
End: 2021-04-09

## 2021-04-09 DIAGNOSIS — Z23 NEED FOR VACCINATION: Primary | ICD-10-CM

## 2021-04-09 DIAGNOSIS — L02.219 CELLULITIS AND ABSCESS OF TRUNK: Primary | ICD-10-CM

## 2021-04-09 DIAGNOSIS — L03.319 CELLULITIS AND ABSCESS OF TRUNK: Primary | ICD-10-CM

## 2021-04-09 LAB
A ALTERNATA IGE QN: <0.1 KU/L
A FUMIGATUS IGE QN: <0.1 KU/L
ALLERGEN CHAETOMIUM GLOBOSUM IGE: <0.1 KU/L
ALLERGEN MELON IGE: <0.1 KU/L
ALLERGEN WALNUT TREE IGE: <0.1 KU/L
ALLERGEN WHITE PINE TREE IGE: <0.1 KU/L
APPLE IGE QN: <0.1 KU/L
APRICOT IGE QN: <0.1 KU/L
BAHIA GRASS IGE QN: <0.1 KU/L
BALD CYPRESS IGE QN: <0.1 KU/L
BANANA IGE QN: <0.1 KU/L
BERMUDA GRASS IGE QN: <0.1 KU/L
C HERBARUM IGE QN: <0.1 KU/L
C LUNATA IGE QN: <0.1 KU/L
CAT DANDER IGE QN: <0.1 KU/L
CHAETOMIUM GLOB. CLASS: NORMAL
COMMON RAGWEED IGE QN: <0.1 KU/L
COTTONWOOD IGE QN: <0.1 KU/L
COW MILK IGE QN: <0.1 KU/L
D FARINAE IGE QN: 0.33 KU/L
D PTERONYSS IGE QN: 0.4 KU/L
DEPRECATED A ALTERNATA IGE RAST QL: NORMAL
DEPRECATED A FUMIGATUS IGE RAST QL: NORMAL
DEPRECATED APPLE IGE RAST QL: NORMAL
DEPRECATED APRICOT IGE RAST QL: NORMAL
DEPRECATED BAHIA GRASS IGE RAST QL: NORMAL
DEPRECATED BALD CYPRESS IGE RAST QL: NORMAL
DEPRECATED BANANA IGE RAST QL: NORMAL
DEPRECATED BERMUDA GRASS IGE RAST QL: NORMAL
DEPRECATED C HERBARUM IGE RAST QL: NORMAL
DEPRECATED C LUNATA IGE RAST QL: NORMAL
DEPRECATED CAT DANDER IGE RAST QL: NORMAL
DEPRECATED COMMON RAGWEED IGE RAST QL: NORMAL
DEPRECATED COTTONWOOD IGE RAST QL: NORMAL
DEPRECATED COW MILK IGE RAST QL: NORMAL
DEPRECATED D FARINAE IGE RAST QL: ABNORMAL
DEPRECATED D PTERONYSS IGE RAST QL: ABNORMAL
DEPRECATED DOG DANDER IGE RAST QL: NORMAL
DEPRECATED EGG WHITE IGE RAST QL: NORMAL
DEPRECATED ELDER IGE RAST QL: NORMAL
DEPRECATED ENGL PLANTAIN IGE RAST QL: NORMAL
DEPRECATED GRAPEFRUIT IGE RAST QL: NORMAL
DEPRECATED HORSE DANDER IGE RAST QL: NORMAL
DEPRECATED JOHNSON GRASS IGE RAST QL: NORMAL
DEPRECATED LEMON IGE RAST QL: NORMAL
DEPRECATED LIME IGE RAST QL: NORMAL
DEPRECATED LONDON PLANE IGE RAST QL: NORMAL
DEPRECATED MANGO IGE RAST QL: NORMAL
DEPRECATED MUGWORT IGE RAST QL: NORMAL
DEPRECATED OAT IGE RAST QL: NORMAL
DEPRECATED ORANGE IGE RAST QL: NORMAL
DEPRECATED P NOTATUM IGE RAST QL: NORMAL
DEPRECATED PEACH IGE RAST QL: NORMAL
DEPRECATED PEANUT IGE RAST QL: NORMAL
DEPRECATED PEAR IGE RAST QL: NORMAL
DEPRECATED PECAN/HICK NUT IGE RAST QL: NORMAL
DEPRECATED PECAN/HICK TREE IGE RAST QL: NORMAL
DEPRECATED PINEAPPLE IGE RAST QL: NORMAL
DEPRECATED ROACH IGE RAST QL: ABNORMAL
DEPRECATED S ROSTRATA IGE RAST QL: NORMAL
DEPRECATED SALTWORT IGE RAST QL: NORMAL
DEPRECATED SESAME SEED IGE RAST QL: NORMAL
DEPRECATED SILVER BIRCH IGE RAST QL: NORMAL
DEPRECATED SOYBEAN IGE RAST QL: NORMAL
DEPRECATED STRAWBERRY IGE RAST QL: NORMAL
DEPRECATED SUNFLOWER SEED IGE RAST QL: NORMAL
DEPRECATED TIMOTHY IGE RAST QL: NORMAL
DEPRECATED TOMATO IGE RAST QL: NORMAL
DEPRECATED WEST RAGWEED IGE RAST QL: ABNORMAL
DEPRECATED WHEAT IGE RAST QL: NORMAL
DEPRECATED WHITE OAK IGE RAST QL: NORMAL
DEPRECATED WILLOW IGE RAST QL: NORMAL
DOG DANDER IGE QN: <0.1 KU/L
EGG WHITE IGE QN: <0.1 KU/L
ELDER IGE QN: <0.1 KU/L
ENGL PLANTAIN IGE QN: <0.1 KU/L
GRAPEFRUIT IGE QN: <0.1 KU/L
HORSE DANDER IGE QN: <0.1 KU/L
JOHNSON GRASS IGE QN: <0.1 KU/L
LEMON IGE QN: <0.1 KU/L
LIME IGE QN: <0.1 KU/L
LONDON PLANE IGE QN: <0.1 KU/L
MANGO IGE QN: <0.1 KU/L
MELON CLASS: NORMAL
MUGWORT IGE QN: <0.1 KU/L
OAT IGE QN: <0.1 KU/L
ORANGE IGE QN: <0.1 KU/L
P NOTATUM IGE QN: <0.1 KU/L
PEACH IGE QN: <0.1 KU/L
PEANUT IGE QN: <0.1 KU/L
PEAR IGE QN: <0.1 KU/L
PECAN/HICK NUT IGE QN: <0.1 KU/L
PECAN/HICK TREE IGE QN: <0.1 KU/L
PINEAPPLE IGE QN: <0.1 KU/L
ROACH IGE QN: 0.23 KU/L
S ROSTRATA IGE QN: <0.1 KU/L
SALTWORT IGE QN: <0.1 KU/L
SESAME SEED IGE QN: <0.1 KU/L
SILVER BIRCH IGE QN: <0.1 KU/L
SOYBEAN IGE QN: <0.1 KU/L
STRAWBERRY IGE QN: <0.1 KU/L
SUNFLOWER SEED IGE QN: <0.1 KU/L
TIMOTHY IGE QN: <0.1 KU/L
TOMATO IGE QN: <0.1 KU/L
WALNUT TREE CLASS: NORMAL
WEST RAGWEED IGE QN: 0.14 KU/L
WHEAT IGE QN: <0.1 KU/L
WHITE OAK IGE QN: <0.1 KU/L
WHITE PINE CLASS: NORMAL
WILLOW IGE QN: <0.1 KU/L

## 2021-04-09 PROCEDURE — 91300 COVID-19, MRNA, LNP-S, PF, 30 MCG/0.3 ML DOSE VACCINE: CPT | Mod: S$GLB,,, | Performed by: FAMILY MEDICINE

## 2021-04-09 PROCEDURE — 0002A COVID-19, MRNA, LNP-S, PF, 30 MCG/0.3 ML DOSE VACCINE: ICD-10-PCS | Mod: CV19,S$GLB,, | Performed by: FAMILY MEDICINE

## 2021-04-09 PROCEDURE — 0002A COVID-19, MRNA, LNP-S, PF, 30 MCG/0.3 ML DOSE VACCINE: CPT | Mod: CV19,S$GLB,, | Performed by: FAMILY MEDICINE

## 2021-04-09 PROCEDURE — 91300 COVID-19, MRNA, LNP-S, PF, 30 MCG/0.3 ML DOSE VACCINE: ICD-10-PCS | Mod: S$GLB,,, | Performed by: FAMILY MEDICINE

## 2021-04-10 ENCOUNTER — PATIENT MESSAGE (OUTPATIENT)
Dept: FAMILY MEDICINE | Facility: CLINIC | Age: 41
End: 2021-04-10

## 2021-04-10 RX ORDER — AMOXICILLIN AND CLAVULANATE POTASSIUM 875; 125 MG/1; MG/1
1 TABLET, FILM COATED ORAL EVERY 12 HOURS
Qty: 14 TABLET | Refills: 0 | Status: SHIPPED | OUTPATIENT
Start: 2021-04-10 | End: 2021-04-21 | Stop reason: ALTCHOICE

## 2021-04-12 LAB
DEPRECATED WATERMELON IGE RAST QL: NORMAL
WATERMELON IGE QN: <0.1 KU/L

## 2021-04-13 ENCOUNTER — PATIENT MESSAGE (OUTPATIENT)
Dept: ALLERGY | Facility: CLINIC | Age: 41
End: 2021-04-13

## 2021-04-14 ENCOUNTER — PATIENT MESSAGE (OUTPATIENT)
Dept: FAMILY MEDICINE | Facility: CLINIC | Age: 41
End: 2021-04-14

## 2021-04-19 ENCOUNTER — PATIENT MESSAGE (OUTPATIENT)
Dept: FAMILY MEDICINE | Facility: CLINIC | Age: 41
End: 2021-04-19

## 2021-04-21 ENCOUNTER — OFFICE VISIT (OUTPATIENT)
Dept: FAMILY MEDICINE | Facility: CLINIC | Age: 41
End: 2021-04-21
Payer: COMMERCIAL

## 2021-04-21 VITALS
WEIGHT: 142.19 LBS | HEART RATE: 89 BPM | HEIGHT: 61 IN | BODY MASS INDEX: 26.85 KG/M2 | DIASTOLIC BLOOD PRESSURE: 68 MMHG | SYSTOLIC BLOOD PRESSURE: 124 MMHG | OXYGEN SATURATION: 98 %

## 2021-04-21 DIAGNOSIS — K21.9 GASTROESOPHAGEAL REFLUX DISEASE, UNSPECIFIED WHETHER ESOPHAGITIS PRESENT: ICD-10-CM

## 2021-04-21 DIAGNOSIS — Z98.890 H/O EXCISION OF EPIDERMAL INCLUSION CYST: ICD-10-CM

## 2021-04-21 DIAGNOSIS — R05.3 CHRONIC COUGH: Primary | ICD-10-CM

## 2021-04-21 DIAGNOSIS — Z87.2 H/O EXCISION OF EPIDERMAL INCLUSION CYST: ICD-10-CM

## 2021-04-21 PROCEDURE — 3008F PR BODY MASS INDEX (BMI) DOCUMENTED: ICD-10-PCS | Mod: CPTII,S$GLB,, | Performed by: FAMILY MEDICINE

## 2021-04-21 PROCEDURE — 99213 PR OFFICE/OUTPT VISIT, EST, LEVL III, 20-29 MIN: ICD-10-PCS | Mod: S$GLB,,, | Performed by: FAMILY MEDICINE

## 2021-04-21 PROCEDURE — 1126F PR PAIN SEVERITY QUANTIFIED, NO PAIN PRESENT: ICD-10-PCS | Mod: S$GLB,,, | Performed by: FAMILY MEDICINE

## 2021-04-21 PROCEDURE — 99999 PR PBB SHADOW E&M-EST. PATIENT-LVL III: ICD-10-PCS | Mod: PBBFAC,,, | Performed by: FAMILY MEDICINE

## 2021-04-21 PROCEDURE — 99213 OFFICE O/P EST LOW 20 MIN: CPT | Mod: S$GLB,,, | Performed by: FAMILY MEDICINE

## 2021-04-21 PROCEDURE — 1126F AMNT PAIN NOTED NONE PRSNT: CPT | Mod: S$GLB,,, | Performed by: FAMILY MEDICINE

## 2021-04-21 PROCEDURE — 3008F BODY MASS INDEX DOCD: CPT | Mod: CPTII,S$GLB,, | Performed by: FAMILY MEDICINE

## 2021-04-21 PROCEDURE — 99999 PR PBB SHADOW E&M-EST. PATIENT-LVL III: CPT | Mod: PBBFAC,,, | Performed by: FAMILY MEDICINE

## 2021-04-21 RX ORDER — MAG CARB/ALUMINUM HYDROX/ALGIN 358-95/15
SUSPENSION, ORAL (FINAL DOSE FORM) ORAL
Qty: 355 ML | Refills: 0 | Status: SHIPPED | OUTPATIENT
Start: 2021-04-21 | End: 2021-05-07 | Stop reason: ALTCHOICE

## 2021-05-07 ENCOUNTER — OFFICE VISIT (OUTPATIENT)
Dept: FAMILY MEDICINE | Facility: CLINIC | Age: 41
End: 2021-05-07
Payer: COMMERCIAL

## 2021-05-07 VITALS
SYSTOLIC BLOOD PRESSURE: 122 MMHG | HEART RATE: 98 BPM | OXYGEN SATURATION: 99 % | BODY MASS INDEX: 26.43 KG/M2 | HEIGHT: 61 IN | DIASTOLIC BLOOD PRESSURE: 78 MMHG | WEIGHT: 140 LBS

## 2021-05-07 DIAGNOSIS — Z32.01 PREGNANCY TEST-POSITIVE: ICD-10-CM

## 2021-05-07 DIAGNOSIS — J30.2 SEASONAL ALLERGIC RHINITIS, UNSPECIFIED TRIGGER: ICD-10-CM

## 2021-05-07 DIAGNOSIS — Z12.31 ENCOUNTER FOR SCREENING MAMMOGRAM FOR BREAST CANCER: ICD-10-CM

## 2021-05-07 DIAGNOSIS — Z51.81 ENCOUNTER FOR MONITORING LONG-TERM PROTON PUMP INHIBITOR THERAPY: ICD-10-CM

## 2021-05-07 DIAGNOSIS — K21.9 GASTROESOPHAGEAL REFLUX DISEASE, UNSPECIFIED WHETHER ESOPHAGITIS PRESENT: ICD-10-CM

## 2021-05-07 DIAGNOSIS — E55.9 VITAMIN D DEFICIENCY: ICD-10-CM

## 2021-05-07 DIAGNOSIS — Z79.899 ENCOUNTER FOR MONITORING LONG-TERM PROTON PUMP INHIBITOR THERAPY: ICD-10-CM

## 2021-05-07 DIAGNOSIS — K21.9 LARYNGOPHARYNGEAL REFLUX (LPR): ICD-10-CM

## 2021-05-07 DIAGNOSIS — R05.3 CHRONIC COUGH: ICD-10-CM

## 2021-05-07 DIAGNOSIS — O21.0 MORNING SICKNESS: ICD-10-CM

## 2021-05-07 DIAGNOSIS — Z00.00 ROUTINE GENERAL MEDICAL EXAMINATION AT A HEALTH CARE FACILITY: Primary | ICD-10-CM

## 2021-05-07 PROCEDURE — 99396 PREV VISIT EST AGE 40-64: CPT | Mod: S$GLB,,, | Performed by: FAMILY MEDICINE

## 2021-05-07 PROCEDURE — 3008F BODY MASS INDEX DOCD: CPT | Mod: CPTII,S$GLB,, | Performed by: FAMILY MEDICINE

## 2021-05-07 PROCEDURE — 99999 PR PBB SHADOW E&M-EST. PATIENT-LVL III: CPT | Mod: PBBFAC,,, | Performed by: FAMILY MEDICINE

## 2021-05-07 PROCEDURE — 1126F AMNT PAIN NOTED NONE PRSNT: CPT | Mod: S$GLB,,, | Performed by: FAMILY MEDICINE

## 2021-05-07 PROCEDURE — 1126F PR PAIN SEVERITY QUANTIFIED, NO PAIN PRESENT: ICD-10-PCS | Mod: S$GLB,,, | Performed by: FAMILY MEDICINE

## 2021-05-07 PROCEDURE — 99396 PR PREVENTIVE VISIT,EST,40-64: ICD-10-PCS | Mod: S$GLB,,, | Performed by: FAMILY MEDICINE

## 2021-05-07 PROCEDURE — 3008F PR BODY MASS INDEX (BMI) DOCUMENTED: ICD-10-PCS | Mod: CPTII,S$GLB,, | Performed by: FAMILY MEDICINE

## 2021-05-07 PROCEDURE — 99999 PR PBB SHADOW E&M-EST. PATIENT-LVL III: ICD-10-PCS | Mod: PBBFAC,,, | Performed by: FAMILY MEDICINE

## 2021-05-07 RX ORDER — PROMETHAZINE HYDROCHLORIDE AND DEXTROMETHORPHAN HYDROBROMIDE 6.25; 15 MG/5ML; MG/5ML
5 SYRUP ORAL EVERY 4 HOURS PRN
Qty: 240 ML | Refills: 3 | Status: SHIPPED | OUTPATIENT
Start: 2021-05-07 | End: 2021-05-17

## 2021-05-07 RX ORDER — GUAIFENESIN/DEXTROMETHORPHAN 100-10MG/5
5 SYRUP ORAL EVERY 6 HOURS PRN
Qty: 236 ML | Refills: 11 | Status: SHIPPED | OUTPATIENT
Start: 2021-05-07 | End: 2021-05-17

## 2021-05-07 NOTE — TELEPHONE ENCOUNTER
Patient: Jon Rothman Sr. Date: 5/7/2021   YOB: 1948 Attending: Christiano Rooney MD   72 year old male Hospital Day: 5     Vascular Surgery    Reason for follow-up: right great toe pain    Operation/Procedure: 5/6/2021 - Lower extremity angiogram, severe tibial disease bilaterally, right AT and PT angioplasty     Subjective:   Patient seen resting in bed comfortably. Pain is controlled - had some throbbing of the right great toe earlier, though this was improved with Tylenol. No pain of the left groin site - groin is soft, no hematoma. Agreeable to LLE angiogram as an outpatient on Monday, would prefer to discharge home in between.     Allergies:  ALLERGIES:   Allergen Reactions    Sulfa Antibiotics Other (See Comments)     Unsure of reaction        Reviewed: Allergies, Medical History, Surgical History and Medications    Vital Last Value 24 Hour Range   Temperature 97.5 °F (36.4 °C) (05/07/21 0737) Temp  Min: 97.5 °F (36.4 °C)  Max: 98.7 °F (37.1 °C)   Pulse 66 (05/07/21 0737) Pulse  Min: 61  Max: 81   Respiratory 18 (05/07/21 0737) Resp  Min: 12  Max: 19   Non-Invasive  Blood Pressure (!) 166/73 (05/07/21 0737) BP  Min: 101/55  Max: 189/64   Arterial   Blood Pressure 116/60 (05/06/21 2245) Arterial Line BP  Min: 116/60  Max: 200/86   Pulse Oximetry 98 % (05/07/21 0737) SpO2  Min: 86 %  Max: 100 %     Vital Today Admitted   Weight 95.9 kg (05/03/21 2233) Weight: 95.9 kg (05/03/21 2233)   Height N/A Height: 5' 8\" (172.7 cm) (05/03/21 2233)   BMI (body mass index) N/A BMI (Calculated): 32.15 (05/03/21 2233)     Weight over the past 48 Hours:  No data found.     Hemodynamics:      Last Value 24 Hour Range   CVP   No data recorded   PAS/PAD   No data recorded   PCWP   No data recorded   CO   No data recorded   CI   No data recorded   SV02   No data recorded       Intake/Output:    Last stool occurrence: 1 (05/06/21 1430)    I/O last 3 completed shifts:  In: 920 [P.O.:920]  Out: 5336  Pharmacy got in touch and stated that esomeprazole dr 40mg is not covered by insurance, can you send an alternative?   [Urine:2660]      Intake/Output Summary (Last 24 hours) at 5/7/2021 1200  Last data filed at 5/7/2021 0353  Gross per 24 hour   Intake 920 ml   Output 2660 ml   Net -1740 ml       Rhythm: NSR    PHYSICAL EXAM:  General appearance: awake, alert, cooperative, resting in bed comfortably, no acute distress   Head: Normocephalic, atraumatic   Nose: Nares normal. No drainage.   Neck: supple, symmetrical, trachea midline  Lungs: clear though diminished, respirations unlabored on room air, no wheeze  Heart: RRR, normal S1S2  Abdomen: rounded, soft, non-tender, non-distended, + bowel sounds, no overt pulsation   Extremities:   Right groin healed scar from hernia repair  RLE warm to touch. Calf soft, supple, non-tender. Right lower leg with some superficial abrasions to shin. Right great toe with ischemic appearance of distal great toe and ulceration under nail - remains dry; appears to be demarcating. Improved rubor of foot. Stable + DP/PT doppler signals. + neuropathy.  LLE warm to touch throughout. Left groin puncture site CDI. Groin soft, no hematoma appreciated. Calf soft, supple, non-tender. S/p left great toe amp, incision CDI and closed with sutures. Stable discoloration of remaining toe and dorsal foot.   Left foot (photo from 5/4):              Right foot (photo from 5/4):     Skin: Skin color, texture, turgor normal. No rashes or lesions with exceptions noted above  Neurologic: Alert and oriented X 3, normal strength and tone. Normal coordination and mentation  Pulses:    Femoral pulse palpable bilaterally   Right DP: + doppler  Right PT: + doppler  Left DP: + doppler  Left PT: + doppler        Laboratory Results:   Recent Labs   Lab 05/07/21  0418 05/06/21  0415 05/05/21  1846 05/04/21  0345 05/03/21  1526   SODIUM 135 139 136 137 135   POTASSIUM 4.2 3.7 4.1 3.9 4.3   CHLORIDE 103 104 101 106 101   CO2 28 30 30 26 28   BUN 18 22* 17 24* 27*   CREATININE 1.07 1.29* 1.11 1.09 1.35*   GLUCOSE 212* 85 166* 127* 140*    MG 2.4 2.3  --   --  2.2   WBC 7.8 8.7  --  8.2 10.4   HGB 12.3* 12.4*  --  12.1* 13.9   HCT 39.1 38.4*  --  38.8* 43.6    265  --  226 314   PT  --   --   --   --  10.9   INR  --   --   --   --  1.0   PTT  --   --   --   --  26   BILIRUBIN  --   --   --   --  0.8   ALKPT  --   --   --   --  152*   AST  --   --   --   --  18   GPT  --   --   --   --  28     Imagin2021 - Lower extremity angiogram  FINDINGS:  No significant aortoiliac, femoral or popliteal disease.  Severe tibial disease bilaterally, only peroneal artery runoff.  Anterior tibial artery and posterior tibial artery had multiple high-grade stenoses and a few short segment occlusions.  Right side proximal anterior tibial artery and posterior tibial artery were treated.  I was not able to obtain desired result because of upper and over access and inability to push the balloon through the lesions that I was able to cross through the wire.     He will benefit from an antegrade angio on the left side to treat the left foot, and also another antegrade access on the right side to treat the right foot again.        DESCRIPTION OF PROCEDURE:  The left common femoral artery was accessed with micropuncture needle and wire under external ultrasound guidance.  Ultrasound was used to assess the vessel patency, localizing needle inside the blood vessel.  Vessel was patent, permanent recording for patient's chart was made.  A 4-Urdu sheath was inserted.  Omniflush catheter was advanced into the aorta and aortogram and bilateral lower extremity angiogram was performed.  Findings were as above.  I decided to intervene, therefore, a 590 sheath was advanced up to the right popliteal artery and then anterior tibial artery lesions were crossed down into the foot.  I used a 3.0-2.5 tapered balloon, but that would not go; therefore, a 2.0x80 balloon was used to predilate the vessel and then angioplastied with a 3.0-2.5 balloon, which was able to be crossed  down to the proximal two-thirds of the vessel, and the angioplasty was performed.  Completion angiogram showed excellent result, complete resolution of the stenosis in the proximal area, and non-flow limiting dissection in the midsegment, but distally it was not able to push the balloon down to the foot because of the upper and over access and loss of pushability.     The posterior tibial artery was successfully crossed down to the ankle and then angioplastied, and the result was good, almost complete resolution of the stenosis and much better flow in the right foot.     He will get another angio antegrade on the left side to treat the left tibial disease.  Sheath was exchanged for a short 5-Congolese sheath, which will be removed once anticoagulation is reversed.  The patient was transferred to the floor for further care.    5/4/2021 - CTA Chest Abdomen Pelvis  IMPRESSION:  1.  Negative for embolic source in the chest, abdomen/pelvis.  2.  Mild atherosclerosis in the aorta and iliofemoral arteries without  significant luminal stenosis or obstruction.  3.  No acute aortic abnormality.        Diagnosis/Comorbidities/Complications:  - PAD  - 5/6/2021: Lower extremity angiogram, severe tibial disease bilaterally, right AT and PT angioplasty  - Non-healing right great toe ulceration  - Non-healing left great toe ulcer s/p partial amp 4/26/2021  - CKD  - HTN  - HLD  - DMII  - GERD  - Lumbosacral spondylosis w/ chronic back pain  - Cutaneous T-cell lymphoma   - Left CNIII palsy  - Mixed hearing loss  - Venous insufficiency  - Asthma    Plans/Recommendations:  Vascular surgery following for evaluation of PAD with right great toe ulceration.     - s/p angiogram 5/6 as above. Findings of severe tibial disease bilaterally. He underwent RLE AT and PT angioplasty, though does have some residual stenosis which could be treated again with antegrade right side access if wound fails to heal.   - Patient also will benefit from LLE  angiogram to optimize arterial disease to heal prior left great toe amp, via left antegrade approach.   - Scheduled as an outpatient for LLE angiogram on Monday 5/10/2021 at 7:00AM.    - Provided patient with instruction folder for procedure  - Updated COVID test ordered, to be completed prior to DC for procedure on Monday (will be within 72 hours)  - Continue local wound care of bilateral great toe sites in the interim. Monitor ischemic demarcation.    - Podiatry team is on consult, previously performed L partial toe amp 4/26/2021. Plan for outpatient follow-up in 1 week for suture removal.   - ID consulted for antibiotic recommendations - being observed off of abx therapy.   - Appreciate additional medical management per attending team.     Stable to discharge from a vascular surgery perspective.     Please complete updated COVID test prior to discharge.     Discharge Plan:    Pending     Manuelito Blanc PA-C  [phone]    Doing well  Groin no hematoma  I have seen and examined the patient personally.  I performed the history, physical examination and medical decision making & reviewed lab results and imaging for this encounter and agree with the above note by my physician assistant.     I have updated the assessment and plan as appropriate.    Bin Henderson  Vascular Surgeon  Pager [phone]

## 2021-05-19 DIAGNOSIS — Z12.31 OTHER SCREENING MAMMOGRAM: ICD-10-CM

## 2021-06-02 ENCOUNTER — TELEPHONE (OUTPATIENT)
Dept: OBSTETRICS AND GYNECOLOGY | Facility: CLINIC | Age: 41
End: 2021-06-02

## 2021-06-02 DIAGNOSIS — Z34.90 PREGNANCY: Primary | ICD-10-CM

## 2021-06-22 ENCOUNTER — OFFICE VISIT (OUTPATIENT)
Dept: OBSTETRICS AND GYNECOLOGY | Facility: CLINIC | Age: 41
End: 2021-06-22
Payer: COMMERCIAL

## 2021-06-22 ENCOUNTER — PROCEDURE VISIT (OUTPATIENT)
Dept: OBSTETRICS AND GYNECOLOGY | Facility: CLINIC | Age: 41
End: 2021-06-22
Attending: OBSTETRICS & GYNECOLOGY
Payer: COMMERCIAL

## 2021-06-22 ENCOUNTER — LAB VISIT (OUTPATIENT)
Dept: LAB | Facility: OTHER | Age: 41
End: 2021-06-22
Attending: ADVANCED PRACTICE MIDWIFE
Payer: COMMERCIAL

## 2021-06-22 VITALS
WEIGHT: 149.06 LBS | SYSTOLIC BLOOD PRESSURE: 100 MMHG | BODY MASS INDEX: 28.14 KG/M2 | HEIGHT: 61 IN | DIASTOLIC BLOOD PRESSURE: 58 MMHG

## 2021-06-22 DIAGNOSIS — Z32.01 POSITIVE PREGNANCY TEST: ICD-10-CM

## 2021-06-22 DIAGNOSIS — Z36.89 ESTABLISH GESTATIONAL AGE, ULTRASOUND: ICD-10-CM

## 2021-06-22 DIAGNOSIS — Z34.82 ENCOUNTER FOR SUPERVISION OF OTHER NORMAL PREGNANCY IN SECOND TRIMESTER: ICD-10-CM

## 2021-06-22 DIAGNOSIS — Z32.01 POSITIVE PREGNANCY TEST: Primary | ICD-10-CM

## 2021-06-22 DIAGNOSIS — Z34.90 PREGNANCY: ICD-10-CM

## 2021-06-22 LAB
ABO + RH BLD: NORMAL
ANION GAP SERPL CALC-SCNC: 11 MMOL/L (ref 8–16)
B-HCG UR QL: POSITIVE
BASOPHILS # BLD AUTO: 0.03 K/UL (ref 0–0.2)
BASOPHILS NFR BLD: 0.3 % (ref 0–1.9)
BLD GP AB SCN CELLS X3 SERPL QL: NORMAL
BUN SERPL-MCNC: 11 MG/DL (ref 6–20)
CALCIUM SERPL-MCNC: 9.8 MG/DL (ref 8.7–10.5)
CHLORIDE SERPL-SCNC: 107 MMOL/L (ref 95–110)
CO2 SERPL-SCNC: 18 MMOL/L (ref 23–29)
CREAT SERPL-MCNC: 0.6 MG/DL (ref 0.5–1.4)
CTP QC/QA: YES
DIFFERENTIAL METHOD: ABNORMAL
EOSINOPHIL # BLD AUTO: 0.3 K/UL (ref 0–0.5)
EOSINOPHIL NFR BLD: 2.6 % (ref 0–8)
ERYTHROCYTE [DISTWIDTH] IN BLOOD BY AUTOMATED COUNT: 14.9 % (ref 11.5–14.5)
EST. GFR  (AFRICAN AMERICAN): >60 ML/MIN/1.73 M^2
EST. GFR  (NON AFRICAN AMERICAN): >60 ML/MIN/1.73 M^2
GLUCOSE SERPL-MCNC: 87 MG/DL (ref 70–110)
HCT VFR BLD AUTO: 39 % (ref 37–48.5)
HGB BLD-MCNC: 12.8 G/DL (ref 12–16)
IMM GRANULOCYTES # BLD AUTO: 0.07 K/UL (ref 0–0.04)
IMM GRANULOCYTES NFR BLD AUTO: 0.6 % (ref 0–0.5)
LYMPHOCYTES # BLD AUTO: 2.1 K/UL (ref 1–4.8)
LYMPHOCYTES NFR BLD: 19.5 % (ref 18–48)
MCH RBC QN AUTO: 29.5 PG (ref 27–31)
MCHC RBC AUTO-ENTMCNC: 32.8 G/DL (ref 32–36)
MCV RBC AUTO: 90 FL (ref 82–98)
MONOCYTES # BLD AUTO: 0.7 K/UL (ref 0.3–1)
MONOCYTES NFR BLD: 6.3 % (ref 4–15)
NEUTROPHILS # BLD AUTO: 7.8 K/UL (ref 1.8–7.7)
NEUTROPHILS NFR BLD: 70.7 % (ref 38–73)
NRBC BLD-RTO: 0 /100 WBC
PLATELET # BLD AUTO: 195 K/UL (ref 150–450)
PMV BLD AUTO: 11.8 FL (ref 9.2–12.9)
POTASSIUM SERPL-SCNC: 3.7 MMOL/L (ref 3.5–5.1)
RBC # BLD AUTO: 4.34 M/UL (ref 4–5.4)
SODIUM SERPL-SCNC: 136 MMOL/L (ref 136–145)
WBC # BLD AUTO: 10.98 K/UL (ref 3.9–12.7)

## 2021-06-22 PROCEDURE — 81025 URINE PREGNANCY TEST: CPT | Mod: S$GLB,,, | Performed by: ADVANCED PRACTICE MIDWIFE

## 2021-06-22 PROCEDURE — 36415 COLL VENOUS BLD VENIPUNCTURE: CPT | Performed by: ADVANCED PRACTICE MIDWIFE

## 2021-06-22 PROCEDURE — 86592 SYPHILIS TEST NON-TREP QUAL: CPT | Performed by: ADVANCED PRACTICE MIDWIFE

## 2021-06-22 PROCEDURE — 1126F AMNT PAIN NOTED NONE PRSNT: CPT | Mod: S$GLB,,, | Performed by: ADVANCED PRACTICE MIDWIFE

## 2021-06-22 PROCEDURE — 87340 HEPATITIS B SURFACE AG IA: CPT | Performed by: ADVANCED PRACTICE MIDWIFE

## 2021-06-22 PROCEDURE — 80048 BASIC METABOLIC PNL TOTAL CA: CPT | Performed by: ADVANCED PRACTICE MIDWIFE

## 2021-06-22 PROCEDURE — 87591 N.GONORRHOEAE DNA AMP PROB: CPT | Performed by: ADVANCED PRACTICE MIDWIFE

## 2021-06-22 PROCEDURE — 76816 OB US FOLLOW-UP PER FETUS: CPT | Mod: S$GLB,,, | Performed by: OBSTETRICS & GYNECOLOGY

## 2021-06-22 PROCEDURE — 81025 POCT URINE PREGNANCY: ICD-10-PCS | Mod: S$GLB,,, | Performed by: ADVANCED PRACTICE MIDWIFE

## 2021-06-22 PROCEDURE — 76816 PR  US,PREGNANT UTERUS,F/U,TRANSABD APP: ICD-10-PCS | Mod: S$GLB,,, | Performed by: OBSTETRICS & GYNECOLOGY

## 2021-06-22 PROCEDURE — 3008F BODY MASS INDEX DOCD: CPT | Mod: CPTII,S$GLB,, | Performed by: ADVANCED PRACTICE MIDWIFE

## 2021-06-22 PROCEDURE — 87491 CHLMYD TRACH DNA AMP PROBE: CPT | Performed by: ADVANCED PRACTICE MIDWIFE

## 2021-06-22 PROCEDURE — 86762 RUBELLA ANTIBODY: CPT | Performed by: ADVANCED PRACTICE MIDWIFE

## 2021-06-22 PROCEDURE — 85025 COMPLETE CBC W/AUTO DIFF WBC: CPT | Performed by: ADVANCED PRACTICE MIDWIFE

## 2021-06-22 PROCEDURE — 3008F PR BODY MASS INDEX (BMI) DOCUMENTED: ICD-10-PCS | Mod: CPTII,S$GLB,, | Performed by: ADVANCED PRACTICE MIDWIFE

## 2021-06-22 PROCEDURE — 99213 PR OFFICE/OUTPT VISIT, EST, LEVL III, 20-29 MIN: ICD-10-PCS | Mod: 25,S$GLB,, | Performed by: ADVANCED PRACTICE MIDWIFE

## 2021-06-22 PROCEDURE — 86900 BLOOD TYPING SEROLOGIC ABO: CPT | Performed by: ADVANCED PRACTICE MIDWIFE

## 2021-06-22 PROCEDURE — 99999 PR PBB SHADOW E&M-EST. PATIENT-LVL III: CPT | Mod: PBBFAC,,, | Performed by: ADVANCED PRACTICE MIDWIFE

## 2021-06-22 PROCEDURE — 86703 HIV-1/HIV-2 1 RESULT ANTBDY: CPT | Performed by: ADVANCED PRACTICE MIDWIFE

## 2021-06-22 PROCEDURE — 99999 PR PBB SHADOW E&M-EST. PATIENT-LVL III: ICD-10-PCS | Mod: PBBFAC,,, | Performed by: ADVANCED PRACTICE MIDWIFE

## 2021-06-22 PROCEDURE — 99213 OFFICE O/P EST LOW 20 MIN: CPT | Mod: 25,S$GLB,, | Performed by: ADVANCED PRACTICE MIDWIFE

## 2021-06-22 PROCEDURE — 1126F PR PAIN SEVERITY QUANTIFIED, NO PAIN PRESENT: ICD-10-PCS | Mod: S$GLB,,, | Performed by: ADVANCED PRACTICE MIDWIFE

## 2021-06-23 LAB
HBV SURFACE AG SERPL QL IA: NEGATIVE
HIV 1+2 AB+HIV1 P24 AG SERPL QL IA: NEGATIVE
RPR SER QL: NORMAL
RUBV IGG SER-ACNC: 113 IU/ML
RUBV IGG SER-IMP: REACTIVE

## 2021-06-24 LAB
C TRACH DNA SPEC QL NAA+PROBE: NOT DETECTED
N GONORRHOEA DNA SPEC QL NAA+PROBE: NOT DETECTED

## 2021-07-06 ENCOUNTER — PATIENT MESSAGE (OUTPATIENT)
Dept: ADMINISTRATIVE | Facility: HOSPITAL | Age: 41
End: 2021-07-06

## 2021-07-29 ENCOUNTER — INITIAL PRENATAL (OUTPATIENT)
Dept: OBSTETRICS AND GYNECOLOGY | Facility: CLINIC | Age: 41
End: 2021-07-29
Attending: OBSTETRICS & GYNECOLOGY
Payer: COMMERCIAL

## 2021-07-29 VITALS
BODY MASS INDEX: 28.41 KG/M2 | SYSTOLIC BLOOD PRESSURE: 100 MMHG | DIASTOLIC BLOOD PRESSURE: 60 MMHG | WEIGHT: 150.38 LBS

## 2021-07-29 DIAGNOSIS — R42 DIZZINESS: ICD-10-CM

## 2021-07-29 DIAGNOSIS — Z86.39 HISTORY OF THYROID NODULE: ICD-10-CM

## 2021-07-29 DIAGNOSIS — E55.9 VITAMIN D DEFICIENCY: ICD-10-CM

## 2021-07-29 DIAGNOSIS — N64.4 NIPPLE PAIN: ICD-10-CM

## 2021-07-29 DIAGNOSIS — O09.529 ADVANCED MATERNAL AGE IN MULTIGRAVIDA, UNSPECIFIED TRIMESTER: Primary | ICD-10-CM

## 2021-07-29 PROCEDURE — 0502F PR SUBSEQUENT PRENATAL CARE: ICD-10-PCS | Mod: CPTII,S$GLB,, | Performed by: OBSTETRICS & GYNECOLOGY

## 2021-07-29 PROCEDURE — 99999 PR PBB SHADOW E&M-EST. PATIENT-LVL II: CPT | Mod: PBBFAC,,, | Performed by: OBSTETRICS & GYNECOLOGY

## 2021-07-29 PROCEDURE — 99999 PR PBB SHADOW E&M-EST. PATIENT-LVL II: ICD-10-PCS | Mod: PBBFAC,,, | Performed by: OBSTETRICS & GYNECOLOGY

## 2021-07-29 PROCEDURE — 0502F SUBSEQUENT PRENATAL CARE: CPT | Mod: CPTII,S$GLB,, | Performed by: OBSTETRICS & GYNECOLOGY

## 2021-07-29 RX ORDER — PROMETHAZINE HYDROCHLORIDE AND DEXTROMETHORPHAN HYDROBROMIDE 6.25; 15 MG/5ML; MG/5ML
SYRUP ORAL
Status: ON HOLD | COMMUNITY
Start: 2021-06-28 | End: 2021-11-06 | Stop reason: HOSPADM

## 2021-07-29 RX ORDER — NAPROXEN SODIUM 220 MG/1
81 TABLET, FILM COATED ORAL DAILY
Qty: 90 TABLET | Refills: 3 | Status: SHIPPED | OUTPATIENT
Start: 2021-07-29 | End: 2021-11-29 | Stop reason: ALTCHOICE

## 2021-08-11 ENCOUNTER — PATIENT MESSAGE (OUTPATIENT)
Dept: MATERNAL FETAL MEDICINE | Facility: CLINIC | Age: 41
End: 2021-08-11

## 2021-08-12 ENCOUNTER — PROCEDURE VISIT (OUTPATIENT)
Dept: MATERNAL FETAL MEDICINE | Facility: CLINIC | Age: 41
End: 2021-08-12
Attending: OBSTETRICS & GYNECOLOGY
Payer: COMMERCIAL

## 2021-08-12 DIAGNOSIS — Z36.89 ENCOUNTER FOR FETAL ANATOMIC SURVEY: ICD-10-CM

## 2021-08-12 DIAGNOSIS — O09.529 ADVANCED MATERNAL AGE IN MULTIGRAVIDA, UNSPECIFIED TRIMESTER: ICD-10-CM

## 2021-08-12 PROCEDURE — 76811 PR US, OB FETAL EVAL & EXAM, TRANSABDOM,FIRST GESTATION: ICD-10-PCS | Mod: S$GLB,,, | Performed by: OBSTETRICS & GYNECOLOGY

## 2021-08-12 PROCEDURE — 76811 OB US DETAILED SNGL FETUS: CPT | Mod: S$GLB,,, | Performed by: OBSTETRICS & GYNECOLOGY

## 2021-08-16 DIAGNOSIS — Z36.89 ENCOUNTER FOR ULTRASOUND TO ASSESS FETAL GROWTH: Primary | ICD-10-CM

## 2021-08-16 DIAGNOSIS — O09.529 ADVANCED MATERNAL AGE IN MULTIGRAVIDA, UNSPECIFIED TRIMESTER: ICD-10-CM

## 2021-08-24 ENCOUNTER — LAB VISIT (OUTPATIENT)
Dept: LAB | Facility: OTHER | Age: 41
End: 2021-08-24
Attending: OBSTETRICS & GYNECOLOGY
Payer: COMMERCIAL

## 2021-08-24 ENCOUNTER — ROUTINE PRENATAL (OUTPATIENT)
Dept: OBSTETRICS AND GYNECOLOGY | Facility: CLINIC | Age: 41
End: 2021-08-24
Attending: OBSTETRICS & GYNECOLOGY
Payer: COMMERCIAL

## 2021-08-24 VITALS — BODY MASS INDEX: 29.2 KG/M2 | DIASTOLIC BLOOD PRESSURE: 60 MMHG | WEIGHT: 154.56 LBS | SYSTOLIC BLOOD PRESSURE: 112 MMHG

## 2021-08-24 DIAGNOSIS — E55.9 VITAMIN D DEFICIENCY: Primary | ICD-10-CM

## 2021-08-24 DIAGNOSIS — O09.529 HIGH-RISK PREGNANCY, MULTIGRAVIDA OF ADVANCED MATERNAL AGE, ANTEPARTUM: ICD-10-CM

## 2021-08-24 DIAGNOSIS — E55.9 VITAMIN D DEFICIENCY: ICD-10-CM

## 2021-08-24 DIAGNOSIS — O09.529 ADVANCED MATERNAL AGE IN MULTIGRAVIDA, UNSPECIFIED TRIMESTER: ICD-10-CM

## 2021-08-24 DIAGNOSIS — Z86.39 HISTORY OF THYROID NODULE: ICD-10-CM

## 2021-08-24 LAB
25(OH)D3+25(OH)D2 SERPL-MCNC: 33 NG/ML (ref 30–96)
BASOPHILS # BLD AUTO: 0.02 K/UL (ref 0–0.2)
BASOPHILS NFR BLD: 0.2 % (ref 0–1.9)
DIFFERENTIAL METHOD: ABNORMAL
EOSINOPHIL # BLD AUTO: 0.2 K/UL (ref 0–0.5)
EOSINOPHIL NFR BLD: 2.1 % (ref 0–8)
ERYTHROCYTE [DISTWIDTH] IN BLOOD BY AUTOMATED COUNT: 13.6 % (ref 11.5–14.5)
GLUCOSE SERPL-MCNC: 135 MG/DL (ref 70–140)
HCT VFR BLD AUTO: 33.9 % (ref 37–48.5)
HGB BLD-MCNC: 11.2 G/DL (ref 12–16)
IMM GRANULOCYTES # BLD AUTO: 0.07 K/UL (ref 0–0.04)
IMM GRANULOCYTES NFR BLD AUTO: 0.7 % (ref 0–0.5)
LYMPHOCYTES # BLD AUTO: 1.9 K/UL (ref 1–4.8)
LYMPHOCYTES NFR BLD: 19.3 % (ref 18–48)
MCH RBC QN AUTO: 29.9 PG (ref 27–31)
MCHC RBC AUTO-ENTMCNC: 33 G/DL (ref 32–36)
MCV RBC AUTO: 90 FL (ref 82–98)
MONOCYTES # BLD AUTO: 0.6 K/UL (ref 0.3–1)
MONOCYTES NFR BLD: 6.3 % (ref 4–15)
NEUTROPHILS # BLD AUTO: 6.9 K/UL (ref 1.8–7.7)
NEUTROPHILS NFR BLD: 71.4 % (ref 38–73)
NRBC BLD-RTO: 0 /100 WBC
PLATELET # BLD AUTO: 178 K/UL (ref 150–450)
PMV BLD AUTO: 11.7 FL (ref 9.2–12.9)
RBC # BLD AUTO: 3.75 M/UL (ref 4–5.4)
TSH SERPL DL<=0.005 MIU/L-ACNC: 2.07 UIU/ML (ref 0.4–4)
WBC # BLD AUTO: 9.62 K/UL (ref 3.9–12.7)

## 2021-08-24 PROCEDURE — 36415 COLL VENOUS BLD VENIPUNCTURE: CPT | Performed by: OBSTETRICS & GYNECOLOGY

## 2021-08-24 PROCEDURE — 0502F PR SUBSEQUENT PRENATAL CARE: ICD-10-PCS | Mod: CPTII,S$GLB,, | Performed by: OBSTETRICS & GYNECOLOGY

## 2021-08-24 PROCEDURE — 84443 ASSAY THYROID STIM HORMONE: CPT | Performed by: OBSTETRICS & GYNECOLOGY

## 2021-08-24 PROCEDURE — 0502F SUBSEQUENT PRENATAL CARE: CPT | Mod: CPTII,S$GLB,, | Performed by: OBSTETRICS & GYNECOLOGY

## 2021-08-24 PROCEDURE — 99999 PR PBB SHADOW E&M-EST. PATIENT-LVL II: ICD-10-PCS | Mod: PBBFAC,,, | Performed by: OBSTETRICS & GYNECOLOGY

## 2021-08-24 PROCEDURE — 99999 PR PBB SHADOW E&M-EST. PATIENT-LVL II: CPT | Mod: PBBFAC,,, | Performed by: OBSTETRICS & GYNECOLOGY

## 2021-08-24 PROCEDURE — 82950 GLUCOSE TEST: CPT | Performed by: OBSTETRICS & GYNECOLOGY

## 2021-08-24 PROCEDURE — 85025 COMPLETE CBC W/AUTO DIFF WBC: CPT | Performed by: OBSTETRICS & GYNECOLOGY

## 2021-08-24 PROCEDURE — 82306 VITAMIN D 25 HYDROXY: CPT | Performed by: OBSTETRICS & GYNECOLOGY

## 2021-09-21 ENCOUNTER — ROUTINE PRENATAL (OUTPATIENT)
Dept: OBSTETRICS AND GYNECOLOGY | Facility: CLINIC | Age: 41
End: 2021-09-21
Attending: OBSTETRICS & GYNECOLOGY
Payer: COMMERCIAL

## 2021-09-21 VITALS
SYSTOLIC BLOOD PRESSURE: 106 MMHG | DIASTOLIC BLOOD PRESSURE: 60 MMHG | BODY MASS INDEX: 29.24 KG/M2 | WEIGHT: 154.75 LBS

## 2021-09-21 DIAGNOSIS — O09.523 HIGH RISK PREGNANCY, MULTIGRAVIDA OF ADVANCED MATERNAL AGE IN THIRD TRIMESTER: Primary | ICD-10-CM

## 2021-09-21 PROCEDURE — 99999 PR PBB SHADOW E&M-EST. PATIENT-LVL II: ICD-10-PCS | Mod: PBBFAC,,, | Performed by: OBSTETRICS & GYNECOLOGY

## 2021-09-21 PROCEDURE — 0502F PR SUBSEQUENT PRENATAL CARE: ICD-10-PCS | Mod: CPTII,S$GLB,, | Performed by: OBSTETRICS & GYNECOLOGY

## 2021-09-21 PROCEDURE — 99999 PR PBB SHADOW E&M-EST. PATIENT-LVL II: CPT | Mod: PBBFAC,,, | Performed by: OBSTETRICS & GYNECOLOGY

## 2021-09-21 PROCEDURE — 0502F SUBSEQUENT PRENATAL CARE: CPT | Mod: CPTII,S$GLB,, | Performed by: OBSTETRICS & GYNECOLOGY

## 2021-10-04 ENCOUNTER — PATIENT MESSAGE (OUTPATIENT)
Dept: ADMINISTRATIVE | Facility: HOSPITAL | Age: 41
End: 2021-10-04

## 2021-10-05 ENCOUNTER — ROUTINE PRENATAL (OUTPATIENT)
Dept: OBSTETRICS AND GYNECOLOGY | Facility: CLINIC | Age: 41
End: 2021-10-05
Attending: OBSTETRICS & GYNECOLOGY
Payer: COMMERCIAL

## 2021-10-05 VITALS
BODY MASS INDEX: 29.87 KG/M2 | SYSTOLIC BLOOD PRESSURE: 112 MMHG | WEIGHT: 158.06 LBS | DIASTOLIC BLOOD PRESSURE: 52 MMHG

## 2021-10-05 DIAGNOSIS — O09.523 HIGH RISK PREGNANCY, MULTIGRAVIDA OF ADVANCED MATERNAL AGE IN THIRD TRIMESTER: Primary | ICD-10-CM

## 2021-10-05 DIAGNOSIS — O09.523 HIGH RISK PREGNANCY, MULTIGRAVIDA OF ADVANCED MATERNAL AGE IN THIRD TRIMESTER: ICD-10-CM

## 2021-10-05 DIAGNOSIS — Z23 NEED FOR DIPHTHERIA-TETANUS-PERTUSSIS (TDAP) VACCINE: ICD-10-CM

## 2021-10-05 DIAGNOSIS — Z23 FLU VACCINE NEED: Primary | ICD-10-CM

## 2021-10-05 DIAGNOSIS — O09.529 HIGH-RISK PREGNANCY, MULTIGRAVIDA OF ADVANCED MATERNAL AGE, ANTEPARTUM: ICD-10-CM

## 2021-10-05 PROCEDURE — 90471 TDAP VACCINE GREATER THAN OR EQUAL TO 7YO IM: ICD-10-PCS | Mod: S$GLB,,, | Performed by: OBSTETRICS & GYNECOLOGY

## 2021-10-05 PROCEDURE — 90471 IMMUNIZATION ADMIN: CPT | Mod: S$GLB,,, | Performed by: OBSTETRICS & GYNECOLOGY

## 2021-10-05 PROCEDURE — 90686 FLU VACCINE (QUAD) GREATER THAN OR EQUAL TO 3YO PRESERVATIVE FREE IM: ICD-10-PCS | Mod: S$GLB,,, | Performed by: OBSTETRICS & GYNECOLOGY

## 2021-10-05 PROCEDURE — 0502F PR SUBSEQUENT PRENATAL CARE: ICD-10-PCS | Mod: CPTII,S$GLB,, | Performed by: OBSTETRICS & GYNECOLOGY

## 2021-10-05 PROCEDURE — 90686 IIV4 VACC NO PRSV 0.5 ML IM: CPT | Mod: S$GLB,,, | Performed by: OBSTETRICS & GYNECOLOGY

## 2021-10-05 PROCEDURE — 90715 TDAP VACCINE GREATER THAN OR EQUAL TO 7YO IM: ICD-10-PCS | Mod: S$GLB,,, | Performed by: OBSTETRICS & GYNECOLOGY

## 2021-10-05 PROCEDURE — 0502F SUBSEQUENT PRENATAL CARE: CPT | Mod: CPTII,S$GLB,, | Performed by: OBSTETRICS & GYNECOLOGY

## 2021-10-05 PROCEDURE — 90472 IMMUNIZATION ADMIN EACH ADD: CPT | Mod: S$GLB,,, | Performed by: OBSTETRICS & GYNECOLOGY

## 2021-10-05 PROCEDURE — 90472 FLU VACCINE (QUAD) GREATER THAN OR EQUAL TO 3YO PRESERVATIVE FREE IM: ICD-10-PCS | Mod: S$GLB,,, | Performed by: OBSTETRICS & GYNECOLOGY

## 2021-10-05 PROCEDURE — 99999 PR PBB SHADOW E&M-EST. PATIENT-LVL I: ICD-10-PCS | Mod: PBBFAC,,, | Performed by: OBSTETRICS & GYNECOLOGY

## 2021-10-05 PROCEDURE — 90715 TDAP VACCINE 7 YRS/> IM: CPT | Mod: S$GLB,,, | Performed by: OBSTETRICS & GYNECOLOGY

## 2021-10-05 PROCEDURE — 99999 PR PBB SHADOW E&M-EST. PATIENT-LVL I: CPT | Mod: PBBFAC,,, | Performed by: OBSTETRICS & GYNECOLOGY

## 2021-10-18 ENCOUNTER — PROCEDURE VISIT (OUTPATIENT)
Dept: MATERNAL FETAL MEDICINE | Facility: CLINIC | Age: 41
End: 2021-10-18
Payer: COMMERCIAL

## 2021-10-18 ENCOUNTER — ROUTINE PRENATAL (OUTPATIENT)
Dept: OBSTETRICS AND GYNECOLOGY | Facility: CLINIC | Age: 41
End: 2021-10-18
Attending: OBSTETRICS & GYNECOLOGY
Payer: COMMERCIAL

## 2021-10-18 VITALS
WEIGHT: 158.75 LBS | DIASTOLIC BLOOD PRESSURE: 60 MMHG | SYSTOLIC BLOOD PRESSURE: 112 MMHG | BODY MASS INDEX: 29.99 KG/M2

## 2021-10-18 DIAGNOSIS — Z36.89 ENCOUNTER FOR ULTRASOUND TO ASSESS FETAL GROWTH: ICD-10-CM

## 2021-10-18 DIAGNOSIS — O09.529 HIGH-RISK PREGNANCY, MULTIGRAVIDA OF ADVANCED MATERNAL AGE, ANTEPARTUM: Primary | ICD-10-CM

## 2021-10-18 DIAGNOSIS — O09.529 ADVANCED MATERNAL AGE IN MULTIGRAVIDA, UNSPECIFIED TRIMESTER: ICD-10-CM

## 2021-10-18 PROCEDURE — 99999 PR PBB SHADOW E&M-EST. PATIENT-LVL II: CPT | Mod: PBBFAC,,, | Performed by: OBSTETRICS & GYNECOLOGY

## 2021-10-18 PROCEDURE — 0502F SUBSEQUENT PRENATAL CARE: CPT | Mod: CPTII,S$GLB,, | Performed by: OBSTETRICS & GYNECOLOGY

## 2021-10-18 PROCEDURE — 99999 PR PBB SHADOW E&M-EST. PATIENT-LVL II: ICD-10-PCS | Mod: PBBFAC,,, | Performed by: OBSTETRICS & GYNECOLOGY

## 2021-10-18 PROCEDURE — 76816 OB US FOLLOW-UP PER FETUS: CPT | Mod: PBBFAC | Performed by: OBSTETRICS & GYNECOLOGY

## 2021-10-18 PROCEDURE — 0502F PR SUBSEQUENT PRENATAL CARE: ICD-10-PCS | Mod: CPTII,S$GLB,, | Performed by: OBSTETRICS & GYNECOLOGY

## 2021-10-18 RX ORDER — PNV,CALCIUM 72/IRON/FOLIC ACID 27 MG-1 MG
1 TABLET ORAL DAILY
COMMUNITY
Start: 2021-08-24 | End: 2022-09-12

## 2021-10-25 ENCOUNTER — HOSPITAL ENCOUNTER (OUTPATIENT)
Dept: PERINATAL CARE | Facility: OTHER | Age: 41
Discharge: HOME | DRG: 833 | End: 2021-10-25
Attending: OBSTETRICS & GYNECOLOGY
Payer: COMMERCIAL

## 2021-10-25 ENCOUNTER — ANESTHESIA (OUTPATIENT)
Dept: OBSTETRICS AND GYNECOLOGY | Facility: OTHER | Age: 41
End: 2021-10-25

## 2021-10-25 ENCOUNTER — HOSPITAL ENCOUNTER (INPATIENT)
Facility: OTHER | Age: 41
LOS: 1 days | Discharge: HOME | DRG: 833 | End: 2021-10-26
Attending: OBSTETRICS & GYNECOLOGY | Admitting: OBSTETRICS & GYNECOLOGY
Payer: COMMERCIAL

## 2021-10-25 ENCOUNTER — ANESTHESIA EVENT (OUTPATIENT)
Dept: OBSTETRICS AND GYNECOLOGY | Facility: OTHER | Age: 41
End: 2021-10-25

## 2021-10-25 DIAGNOSIS — O09.523 MULTIGRAVIDA OF ADVANCED MATERNAL AGE IN THIRD TRIMESTER: ICD-10-CM

## 2021-10-25 DIAGNOSIS — Z3A.33 33 WEEKS GESTATION OF PREGNANCY: ICD-10-CM

## 2021-10-25 DIAGNOSIS — O36.8390 NON-REASSURING ELECTRONIC FETAL MONITORING TRACING: Primary | ICD-10-CM

## 2021-10-25 DIAGNOSIS — O09.523 HIGH RISK PREGNANCY, MULTIGRAVIDA OF ADVANCED MATERNAL AGE IN THIRD TRIMESTER: ICD-10-CM

## 2021-10-25 DIAGNOSIS — O28.8 NON-REACTIVE NST (NON-STRESS TEST): ICD-10-CM

## 2021-10-25 LAB
ABO + RH BLD: NORMAL
BASOPHILS # BLD AUTO: 0.02 K/UL (ref 0–0.2)
BASOPHILS NFR BLD: 0.2 % (ref 0–1.9)
BLD GP AB SCN CELLS X3 SERPL QL: NORMAL
DIFFERENTIAL METHOD: ABNORMAL
EOSINOPHIL # BLD AUTO: 0.2 K/UL (ref 0–0.5)
EOSINOPHIL NFR BLD: 2.2 % (ref 0–8)
ERYTHROCYTE [DISTWIDTH] IN BLOOD BY AUTOMATED COUNT: 14.1 % (ref 11.5–14.5)
HCT VFR BLD AUTO: 34.8 % (ref 37–48.5)
HGB BLD-MCNC: 11.3 G/DL (ref 12–16)
HIV 1+2 AB+HIV1 P24 AG SERPL QL IA: NEGATIVE
IMM GRANULOCYTES # BLD AUTO: 0.06 K/UL (ref 0–0.04)
IMM GRANULOCYTES NFR BLD AUTO: 0.7 % (ref 0–0.5)
LYMPHOCYTES # BLD AUTO: 2 K/UL (ref 1–4.8)
LYMPHOCYTES NFR BLD: 23.6 % (ref 18–48)
MCH RBC QN AUTO: 28.7 PG (ref 27–31)
MCHC RBC AUTO-ENTMCNC: 32.5 G/DL (ref 32–36)
MCV RBC AUTO: 88 FL (ref 82–98)
MONOCYTES # BLD AUTO: 0.7 K/UL (ref 0.3–1)
MONOCYTES NFR BLD: 8 % (ref 4–15)
NEUTROPHILS # BLD AUTO: 5.7 K/UL (ref 1.8–7.7)
NEUTROPHILS NFR BLD: 65.3 % (ref 38–73)
NRBC BLD-RTO: 0 /100 WBC
PLATELET # BLD AUTO: 169 K/UL (ref 150–450)
PMV BLD AUTO: 12.6 FL (ref 9.2–12.9)
RBC # BLD AUTO: 3.94 M/UL (ref 4–5.4)
WBC # BLD AUTO: 8.66 K/UL (ref 3.9–12.7)

## 2021-10-25 PROCEDURE — 59025 FETAL NON-STRESS TEST: CPT | Mod: 26,,, | Performed by: OBSTETRICS & GYNECOLOGY

## 2021-10-25 PROCEDURE — 96372 THER/PROPH/DIAG INJ SC/IM: CPT

## 2021-10-25 PROCEDURE — 99283 PR EMERGENCY DEPT VISIT,LEVEL III: ICD-10-PCS | Mod: 25,,, | Performed by: OBSTETRICS & GYNECOLOGY

## 2021-10-25 PROCEDURE — 63600175 PHARM REV CODE 636 W HCPCS: Performed by: STUDENT IN AN ORGANIZED HEALTH CARE EDUCATION/TRAINING PROGRAM

## 2021-10-25 PROCEDURE — 59025 FETAL NON-STRESS TEST: CPT

## 2021-10-25 PROCEDURE — 25000003 PHARM REV CODE 250: Performed by: STUDENT IN AN ORGANIZED HEALTH CARE EDUCATION/TRAINING PROGRAM

## 2021-10-25 PROCEDURE — 59025 PR FETAL 2N-STRESS TEST: ICD-10-PCS | Mod: 26,,, | Performed by: OBSTETRICS & GYNECOLOGY

## 2021-10-25 PROCEDURE — G0378 HOSPITAL OBSERVATION PER HR: HCPCS

## 2021-10-25 PROCEDURE — 99283 EMERGENCY DEPT VISIT LOW MDM: CPT | Mod: 25,,, | Performed by: OBSTETRICS & GYNECOLOGY

## 2021-10-25 PROCEDURE — 99285 EMERGENCY DEPT VISIT HI MDM: CPT | Mod: 25

## 2021-10-25 PROCEDURE — 87081 CULTURE SCREEN ONLY: CPT | Performed by: STUDENT IN AN ORGANIZED HEALTH CARE EDUCATION/TRAINING PROGRAM

## 2021-10-25 PROCEDURE — 76818 FETAL BIOPHYS PROFILE W/NST: CPT

## 2021-10-25 PROCEDURE — 96361 HYDRATE IV INFUSION ADD-ON: CPT

## 2021-10-25 PROCEDURE — 87389 HIV-1 AG W/HIV-1&-2 AB AG IA: CPT | Performed by: OBSTETRICS & GYNECOLOGY

## 2021-10-25 PROCEDURE — 85025 COMPLETE CBC W/AUTO DIFF WBC: CPT | Performed by: STUDENT IN AN ORGANIZED HEALTH CARE EDUCATION/TRAINING PROGRAM

## 2021-10-25 PROCEDURE — 86900 BLOOD TYPING SEROLOGIC ABO: CPT | Performed by: STUDENT IN AN ORGANIZED HEALTH CARE EDUCATION/TRAINING PROGRAM

## 2021-10-25 PROCEDURE — 86592 SYPHILIS TEST NON-TREP QUAL: CPT | Performed by: STUDENT IN AN ORGANIZED HEALTH CARE EDUCATION/TRAINING PROGRAM

## 2021-10-25 PROCEDURE — 99223 PR INITIAL HOSPITAL CARE,LEVL III: ICD-10-PCS | Mod: 25,,, | Performed by: OBSTETRICS & GYNECOLOGY

## 2021-10-25 PROCEDURE — 99223 1ST HOSP IP/OBS HIGH 75: CPT | Mod: 25,,, | Performed by: OBSTETRICS & GYNECOLOGY

## 2021-10-25 PROCEDURE — 96360 HYDRATION IV INFUSION INIT: CPT

## 2021-10-25 RX ORDER — DIPHENHYDRAMINE HCL 25 MG
25 CAPSULE ORAL EVERY 4 HOURS PRN
Status: DISCONTINUED | OUTPATIENT
Start: 2021-10-25 | End: 2021-10-26 | Stop reason: HOSPADM

## 2021-10-25 RX ORDER — ONDANSETRON 8 MG/1
8 TABLET, ORALLY DISINTEGRATING ORAL EVERY 8 HOURS PRN
Status: DISCONTINUED | OUTPATIENT
Start: 2021-10-25 | End: 2021-10-26 | Stop reason: HOSPADM

## 2021-10-25 RX ORDER — AMOXICILLIN 250 MG
1 CAPSULE ORAL NIGHTLY PRN
Status: DISCONTINUED | OUTPATIENT
Start: 2021-10-25 | End: 2021-10-26 | Stop reason: HOSPADM

## 2021-10-25 RX ORDER — BETAMETHASONE SODIUM PHOSPHATE AND BETAMETHASONE ACETATE 3; 3 MG/ML; MG/ML
12 INJECTION, SUSPENSION INTRA-ARTICULAR; INTRALESIONAL; INTRAMUSCULAR; SOFT TISSUE EVERY 24 HOURS
Status: COMPLETED | OUTPATIENT
Start: 2021-10-25 | End: 2021-10-26

## 2021-10-25 RX ORDER — SODIUM CHLORIDE 0.9 % (FLUSH) 0.9 %
10 SYRINGE (ML) INJECTION
Status: DISCONTINUED | OUTPATIENT
Start: 2021-10-25 | End: 2021-10-26 | Stop reason: HOSPADM

## 2021-10-25 RX ORDER — SODIUM CHLORIDE 9 MG/ML
INJECTION, SOLUTION INTRAVENOUS CONTINUOUS
Status: DISCONTINUED | OUTPATIENT
Start: 2021-10-25 | End: 2021-10-26 | Stop reason: HOSPADM

## 2021-10-25 RX ORDER — ACETAMINOPHEN 325 MG/1
650 TABLET ORAL EVERY 6 HOURS PRN
Status: DISCONTINUED | OUTPATIENT
Start: 2021-10-25 | End: 2021-10-26 | Stop reason: HOSPADM

## 2021-10-25 RX ORDER — DIPHENHYDRAMINE HYDROCHLORIDE 50 MG/ML
25 INJECTION INTRAMUSCULAR; INTRAVENOUS EVERY 4 HOURS PRN
Status: DISCONTINUED | OUTPATIENT
Start: 2021-10-25 | End: 2021-10-26 | Stop reason: HOSPADM

## 2021-10-25 RX ORDER — SIMETHICONE 80 MG
1 TABLET,CHEWABLE ORAL EVERY 6 HOURS PRN
Status: DISCONTINUED | OUTPATIENT
Start: 2021-10-25 | End: 2021-10-26 | Stop reason: HOSPADM

## 2021-10-25 RX ORDER — PRENATAL WITH FERROUS FUM AND FOLIC ACID 3080; 920; 120; 400; 22; 1.84; 3; 20; 10; 1; 12; 200; 27; 25; 2 [IU]/1; [IU]/1; MG/1; [IU]/1; MG/1; MG/1; MG/1; MG/1; MG/1; MG/1; UG/1; MG/1; MG/1; MG/1; MG/1
1 TABLET ORAL DAILY
Status: DISCONTINUED | OUTPATIENT
Start: 2021-10-26 | End: 2021-10-26 | Stop reason: HOSPADM

## 2021-10-25 RX ORDER — PROCHLORPERAZINE EDISYLATE 5 MG/ML
5 INJECTION INTRAMUSCULAR; INTRAVENOUS EVERY 6 HOURS PRN
Status: DISCONTINUED | OUTPATIENT
Start: 2021-10-25 | End: 2021-10-26 | Stop reason: HOSPADM

## 2021-10-25 RX ADMIN — BETAMETHASONE SODIUM PHOSPHATE AND BETAMETHASONE ACETATE 12 MG: 3; 3 INJECTION, SUSPENSION INTRA-ARTICULAR; INTRALESIONAL; INTRAMUSCULAR at 05:10

## 2021-10-25 RX ADMIN — SODIUM CHLORIDE: 0.9 INJECTION, SOLUTION INTRAVENOUS at 09:10

## 2021-10-26 VITALS
TEMPERATURE: 97 F | SYSTOLIC BLOOD PRESSURE: 112 MMHG | RESPIRATION RATE: 20 BRPM | DIASTOLIC BLOOD PRESSURE: 61 MMHG | OXYGEN SATURATION: 100 % | HEART RATE: 96 BPM

## 2021-10-26 PROBLEM — O28.8 NON-REACTIVE NST (NON-STRESS TEST): Status: ACTIVE | Noted: 2021-10-26

## 2021-10-26 LAB — RPR SER QL: NORMAL

## 2021-10-26 PROCEDURE — 76818 FETAL BIOPHYS PROFILE W/NST: CPT | Mod: 26,,, | Performed by: OBSTETRICS & GYNECOLOGY

## 2021-10-26 PROCEDURE — 99238 PR HOSPITAL DISCHARGE DAY,<30 MIN: ICD-10-PCS | Mod: 25,,, | Performed by: OBSTETRICS & GYNECOLOGY

## 2021-10-26 PROCEDURE — 96372 THER/PROPH/DIAG INJ SC/IM: CPT | Mod: 59

## 2021-10-26 PROCEDURE — 76819 FETAL BIOPHYS PROFIL W/O NST: CPT

## 2021-10-26 PROCEDURE — 25000003 PHARM REV CODE 250: Performed by: STUDENT IN AN ORGANIZED HEALTH CARE EDUCATION/TRAINING PROGRAM

## 2021-10-26 PROCEDURE — 11000001 HC ACUTE MED/SURG PRIVATE ROOM

## 2021-10-26 PROCEDURE — 63600175 PHARM REV CODE 636 W HCPCS: Performed by: STUDENT IN AN ORGANIZED HEALTH CARE EDUCATION/TRAINING PROGRAM

## 2021-10-26 PROCEDURE — 99238 HOSP IP/OBS DSCHRG MGMT 30/<: CPT | Mod: 25,,, | Performed by: OBSTETRICS & GYNECOLOGY

## 2021-10-26 PROCEDURE — 76818 PR US, OB, FETAL BIOPHYSICAL, W/NST: ICD-10-PCS | Mod: 26,,, | Performed by: OBSTETRICS & GYNECOLOGY

## 2021-10-26 PROCEDURE — 96361 HYDRATE IV INFUSION ADD-ON: CPT

## 2021-10-26 RX ADMIN — SODIUM CHLORIDE: 0.9 INJECTION, SOLUTION INTRAVENOUS at 06:10

## 2021-10-26 RX ADMIN — PRENATAL VIT W/ FE FUMARATE-FA TAB 27-0.8 MG 1 TABLET: 27-0.8 TAB at 09:10

## 2021-10-26 RX ADMIN — BETAMETHASONE SODIUM PHOSPHATE AND BETAMETHASONE ACETATE 12 MG: 3; 3 INJECTION, SUSPENSION INTRA-ARTICULAR; INTRALESIONAL; INTRAMUSCULAR at 09:10

## 2021-10-28 LAB — BACTERIA SPEC AEROBE CULT: NORMAL

## 2021-11-02 ENCOUNTER — ANESTHESIA (OUTPATIENT)
Dept: OBSTETRICS AND GYNECOLOGY | Facility: OTHER | Age: 41
End: 2021-11-02
Payer: COMMERCIAL

## 2021-11-02 ENCOUNTER — ANESTHESIA EVENT (OUTPATIENT)
Dept: OBSTETRICS AND GYNECOLOGY | Facility: OTHER | Age: 41
End: 2021-11-02
Payer: COMMERCIAL

## 2021-11-02 ENCOUNTER — HOSPITAL ENCOUNTER (INPATIENT)
Facility: OTHER | Age: 41
LOS: 4 days | Discharge: HOME | End: 2021-11-06
Attending: OBSTETRICS & GYNECOLOGY | Admitting: OBSTETRICS & GYNECOLOGY
Payer: COMMERCIAL

## 2021-11-02 ENCOUNTER — HOSPITAL ENCOUNTER (OUTPATIENT)
Dept: PERINATAL CARE | Facility: OTHER | Age: 41
Discharge: HOME | End: 2021-11-02
Attending: OBSTETRICS & GYNECOLOGY
Payer: COMMERCIAL

## 2021-11-02 DIAGNOSIS — N64.4 NIPPLE PAIN: ICD-10-CM

## 2021-11-02 DIAGNOSIS — Z92.89 H/O FETAL BIOPHYSICAL PROFILE: ICD-10-CM

## 2021-11-02 DIAGNOSIS — O28.8 NON-REACTIVE NST (NON-STRESS TEST): Primary | ICD-10-CM

## 2021-11-02 DIAGNOSIS — Z3A.34 34 WEEKS GESTATION OF PREGNANCY: ICD-10-CM

## 2021-11-02 DIAGNOSIS — O09.523 HIGH RISK PREGNANCY, MULTIGRAVIDA OF ADVANCED MATERNAL AGE IN THIRD TRIMESTER: ICD-10-CM

## 2021-11-02 LAB
ABO + RH BLD: NORMAL
ALLENS TEST: ABNORMAL
BASOPHILS # BLD AUTO: 0.02 K/UL (ref 0–0.2)
BASOPHILS NFR BLD: 0.2 % (ref 0–1.9)
BLD GP AB SCN CELLS X3 SERPL QL: NORMAL
DELSYS: ABNORMAL
DIFFERENTIAL METHOD: ABNORMAL
EOSINOPHIL # BLD AUTO: 0.1 K/UL (ref 0–0.5)
EOSINOPHIL NFR BLD: 0.8 % (ref 0–8)
ERYTHROCYTE [DISTWIDTH] IN BLOOD BY AUTOMATED COUNT: 14.3 % (ref 11.5–14.5)
HCO3 UR-SCNC: 21.5 MMOL/L (ref 24–28)
HCT VFR BLD AUTO: 38.2 % (ref 37–48.5)
HCT VFR BLD CALC: 64 %PCV (ref 36–54)
HGB BLD-MCNC: 12.4 G/DL (ref 12–16)
HGB BLD-MCNC: 22 G/DL
IMM GRANULOCYTES # BLD AUTO: 0.08 K/UL (ref 0–0.04)
IMM GRANULOCYTES NFR BLD AUTO: 0.6 % (ref 0–0.5)
LYMPHOCYTES # BLD AUTO: 2.8 K/UL (ref 1–4.8)
LYMPHOCYTES NFR BLD: 22.6 % (ref 18–48)
MCH RBC QN AUTO: 28.4 PG (ref 27–31)
MCHC RBC AUTO-ENTMCNC: 32.5 G/DL (ref 32–36)
MCV RBC AUTO: 87 FL (ref 82–98)
MODE: ABNORMAL
MONOCYTES # BLD AUTO: 0.9 K/UL (ref 0.3–1)
MONOCYTES NFR BLD: 7.1 % (ref 4–15)
NEUTROPHILS # BLD AUTO: 8.5 K/UL (ref 1.8–7.7)
NEUTROPHILS NFR BLD: 68.7 % (ref 38–73)
NRBC BLD-RTO: 0 /100 WBC
PCO2 BLDA: 46.6 MMHG (ref 35–45)
PH SMN: 7.27 [PH] (ref 7.35–7.45)
PLATELET # BLD AUTO: 170 K/UL (ref 150–450)
PMV BLD AUTO: 13.3 FL (ref 9.2–12.9)
PO2 BLDA: 29 MMHG (ref 80–100)
POC BE: -5 MMOL/L
POC IONIZED CALCIUM: 1.49 MMOL/L (ref 1.06–1.42)
POC SATURATED O2: 47 % (ref 95–100)
POC TCO2: 23 MMOL/L (ref 23–27)
POTASSIUM BLD-SCNC: 4.2 MMOL/L (ref 3.5–5.1)
RBC # BLD AUTO: 4.37 M/UL (ref 4–5.4)
SAMPLE: ABNORMAL
SITE: ABNORMAL
SODIUM BLD-SCNC: 134 MMOL/L (ref 136–145)
WBC # BLD AUTO: 12.37 K/UL (ref 3.9–12.7)

## 2021-11-02 PROCEDURE — 85025 COMPLETE CBC W/AUTO DIFF WBC: CPT

## 2021-11-02 PROCEDURE — 25000003 PHARM REV CODE 250

## 2021-11-02 PROCEDURE — 88307 TISSUE EXAM BY PATHOLOGIST: CPT | Performed by: PATHOLOGY

## 2021-11-02 PROCEDURE — 86592 SYPHILIS TEST NON-TREP QUAL: CPT

## 2021-11-02 PROCEDURE — 99900035 HC TECH TIME PER 15 MIN (STAT)

## 2021-11-02 PROCEDURE — 59510 CESAREAN DELIVERY: CPT | Mod: ,,, | Performed by: ANESTHESIOLOGY

## 2021-11-02 PROCEDURE — 27200691 HC TRAY, EPIDURAL-SINGLE SHOT: Performed by: ANESTHESIOLOGY

## 2021-11-02 PROCEDURE — 82803 BLOOD GASES ANY COMBINATION: CPT

## 2021-11-02 PROCEDURE — 36415 COLL VENOUS BLD VENIPUNCTURE: CPT

## 2021-11-02 PROCEDURE — 76818 FETAL BIOPHYS PROFILE W/NST: CPT | Mod: 26,,, | Performed by: OBSTETRICS & GYNECOLOGY

## 2021-11-02 PROCEDURE — 99285 EMERGENCY DEPT VISIT HI MDM: CPT | Mod: 25

## 2021-11-02 PROCEDURE — 59025 FETAL NON-STRESS TEST: CPT

## 2021-11-02 PROCEDURE — 25000003 PHARM REV CODE 250: Performed by: ANESTHESIOLOGY

## 2021-11-02 PROCEDURE — 11000001 HC ACUTE MED/SURG PRIVATE ROOM

## 2021-11-02 PROCEDURE — 63600175 PHARM REV CODE 636 W HCPCS: Performed by: STUDENT IN AN ORGANIZED HEALTH CARE EDUCATION/TRAINING PROGRAM

## 2021-11-02 PROCEDURE — 59510 CESAREAN DELIVERY: CPT | Mod: AT,,, | Performed by: OBSTETRICS & GYNECOLOGY

## 2021-11-02 PROCEDURE — 99284 EMERGENCY DEPT VISIT MOD MDM: CPT | Mod: 25,,, | Performed by: OBSTETRICS & GYNECOLOGY

## 2021-11-02 PROCEDURE — 86900 BLOOD TYPING SEROLOGIC ABO: CPT

## 2021-11-02 PROCEDURE — 76818 PR US, OB, FETAL BIOPHYSICAL, W/NST: ICD-10-PCS | Mod: 26,,, | Performed by: OBSTETRICS & GYNECOLOGY

## 2021-11-02 PROCEDURE — 99284 PR EMERGENCY DEPT VISIT,LEVEL IV: ICD-10-PCS | Mod: 25,,, | Performed by: OBSTETRICS & GYNECOLOGY

## 2021-11-02 PROCEDURE — 36004725 HC OB OR TIME LEV III - EA ADD 15 MIN: Performed by: OBSTETRICS & GYNECOLOGY

## 2021-11-02 PROCEDURE — 59025 FETAL NON-STRESS TEST: CPT | Mod: 76

## 2021-11-02 PROCEDURE — 71000039 HC RECOVERY, EACH ADD'L HOUR: Performed by: OBSTETRICS & GYNECOLOGY

## 2021-11-02 PROCEDURE — 59510 PR FULL ROUT OBSTE CARE,CESAREAN DELIV: ICD-10-PCS | Mod: AT,,, | Performed by: OBSTETRICS & GYNECOLOGY

## 2021-11-02 PROCEDURE — 88307 TISSUE EXAM BY PATHOLOGIST: CPT | Mod: 26,,, | Performed by: PATHOLOGY

## 2021-11-02 PROCEDURE — 88307 PR  SURG PATH,LEVEL V: ICD-10-PCS | Mod: 26,,, | Performed by: PATHOLOGY

## 2021-11-02 PROCEDURE — 63600175 PHARM REV CODE 636 W HCPCS

## 2021-11-02 PROCEDURE — 37000009 HC ANESTHESIA EA ADD 15 MINS: Performed by: OBSTETRICS & GYNECOLOGY

## 2021-11-02 PROCEDURE — 36004724 HC OB OR TIME LEV III - 1ST 15 MIN: Performed by: OBSTETRICS & GYNECOLOGY

## 2021-11-02 PROCEDURE — 59510 PRA FULL ROUT OBSTE CARE,CESAREAN DELIV: ICD-10-PCS | Mod: ,,, | Performed by: ANESTHESIOLOGY

## 2021-11-02 PROCEDURE — 63600175 PHARM REV CODE 636 W HCPCS: Performed by: ANESTHESIOLOGY

## 2021-11-02 PROCEDURE — 37000008 HC ANESTHESIA 1ST 15 MINUTES: Performed by: OBSTETRICS & GYNECOLOGY

## 2021-11-02 PROCEDURE — 71000033 HC RECOVERY, INTIAL HOUR: Performed by: OBSTETRICS & GYNECOLOGY

## 2021-11-02 RX ORDER — FAMOTIDINE 10 MG/ML
20 INJECTION INTRAVENOUS
Status: DISCONTINUED | OUTPATIENT
Start: 2021-11-02 | End: 2021-11-02

## 2021-11-02 RX ORDER — ONDANSETRON 2 MG/ML
INJECTION INTRAMUSCULAR; INTRAVENOUS
Status: DISCONTINUED | OUTPATIENT
Start: 2021-11-02 | End: 2021-11-02

## 2021-11-02 RX ORDER — PROCHLORPERAZINE EDISYLATE 5 MG/ML
5 INJECTION INTRAMUSCULAR; INTRAVENOUS EVERY 6 HOURS PRN
Status: DISCONTINUED | OUTPATIENT
Start: 2021-11-02 | End: 2021-11-02

## 2021-11-02 RX ORDER — OXYTOCIN/RINGER'S LACTATE 30/500 ML
334 PLASTIC BAG, INJECTION (ML) INTRAVENOUS ONCE
Status: DISCONTINUED | OUTPATIENT
Start: 2021-11-02 | End: 2021-11-06 | Stop reason: HOSPADM

## 2021-11-02 RX ORDER — IBUPROFEN 400 MG/1
800 TABLET ORAL
Status: DISCONTINUED | OUTPATIENT
Start: 2021-11-03 | End: 2021-11-06 | Stop reason: HOSPADM

## 2021-11-02 RX ORDER — CHLOROPROCAINE HYDROCHLORIDE 20 MG/ML
INJECTION, SOLUTION EPIDURAL; INFILTRATION; INTRACAUDAL; PERINEURAL
Status: DISCONTINUED | OUTPATIENT
Start: 2021-11-02 | End: 2021-11-02

## 2021-11-02 RX ORDER — METHYLERGONOVINE MALEATE 0.2 MG/ML
200 INJECTION INTRAVENOUS ONCE AS NEEDED
Status: DISCONTINUED | OUTPATIENT
Start: 2021-11-02 | End: 2021-11-06 | Stop reason: HOSPADM

## 2021-11-02 RX ORDER — OXYTOCIN/RINGER'S LACTATE 30/500 ML
95 PLASTIC BAG, INJECTION (ML) INTRAVENOUS CONTINUOUS
Status: DISCONTINUED | OUTPATIENT
Start: 2021-11-02 | End: 2021-11-06 | Stop reason: HOSPADM

## 2021-11-02 RX ORDER — TRANEXAMIC ACID 100 MG/ML
1000 INJECTION, SOLUTION INTRAVENOUS ONCE AS NEEDED
Status: DISCONTINUED | OUTPATIENT
Start: 2021-11-02 | End: 2021-11-06 | Stop reason: HOSPADM

## 2021-11-02 RX ORDER — OXYCODONE HYDROCHLORIDE 5 MG/1
10 TABLET ORAL EVERY 4 HOURS PRN
Status: DISCONTINUED | OUTPATIENT
Start: 2021-11-02 | End: 2021-11-02

## 2021-11-02 RX ORDER — METHYLERGONOVINE MALEATE 0.2 MG/ML
200 INJECTION INTRAVENOUS
Status: DISCONTINUED | OUTPATIENT
Start: 2021-11-02 | End: 2021-11-06 | Stop reason: HOSPADM

## 2021-11-02 RX ORDER — SODIUM CHLORIDE, SODIUM LACTATE, POTASSIUM CHLORIDE, CALCIUM CHLORIDE 600; 310; 30; 20 MG/100ML; MG/100ML; MG/100ML; MG/100ML
INJECTION, SOLUTION INTRAVENOUS CONTINUOUS
Status: DISCONTINUED | OUTPATIENT
Start: 2021-11-02 | End: 2021-11-02

## 2021-11-02 RX ORDER — AMOXICILLIN 250 MG
1 CAPSULE ORAL NIGHTLY PRN
Status: DISCONTINUED | OUTPATIENT
Start: 2021-11-02 | End: 2021-11-06 | Stop reason: HOSPADM

## 2021-11-02 RX ORDER — SODIUM CITRATE AND CITRIC ACID MONOHYDRATE 334; 500 MG/5ML; MG/5ML
30 SOLUTION ORAL
Status: DISCONTINUED | OUTPATIENT
Start: 2021-11-02 | End: 2021-11-06 | Stop reason: HOSPADM

## 2021-11-02 RX ORDER — ENOXAPARIN SODIUM 100 MG/ML
40 INJECTION SUBCUTANEOUS EVERY 24 HOURS
Status: DISCONTINUED | OUTPATIENT
Start: 2021-11-03 | End: 2021-11-06 | Stop reason: HOSPADM

## 2021-11-02 RX ORDER — DOCUSATE SODIUM 100 MG/1
200 CAPSULE, LIQUID FILLED ORAL 2 TIMES DAILY
Status: DISCONTINUED | OUTPATIENT
Start: 2021-11-02 | End: 2021-11-06 | Stop reason: HOSPADM

## 2021-11-02 RX ORDER — DEXAMETHASONE SODIUM PHOSPHATE 4 MG/ML
INJECTION, SOLUTION INTRA-ARTICULAR; INTRALESIONAL; INTRAMUSCULAR; INTRAVENOUS; SOFT TISSUE
Status: DISCONTINUED | OUTPATIENT
Start: 2021-11-02 | End: 2021-11-02

## 2021-11-02 RX ORDER — CEFAZOLIN SODIUM 2 G/50ML
2 SOLUTION INTRAVENOUS
Status: DISCONTINUED | OUTPATIENT
Start: 2021-11-02 | End: 2021-11-02

## 2021-11-02 RX ORDER — CEFAZOLIN SODIUM 1 G/3ML
INJECTION, POWDER, FOR SOLUTION INTRAMUSCULAR; INTRAVENOUS
Status: DISCONTINUED | OUTPATIENT
Start: 2021-11-02 | End: 2021-11-02

## 2021-11-02 RX ORDER — PRENATAL WITH FERROUS FUM AND FOLIC ACID 3080; 920; 120; 400; 22; 1.84; 3; 20; 10; 1; 12; 200; 27; 25; 2 [IU]/1; [IU]/1; MG/1; [IU]/1; MG/1; MG/1; MG/1; MG/1; MG/1; MG/1; UG/1; MG/1; MG/1; MG/1; MG/1
1 TABLET ORAL DAILY
Status: DISCONTINUED | OUTPATIENT
Start: 2021-11-03 | End: 2021-11-06 | Stop reason: HOSPADM

## 2021-11-02 RX ORDER — MISOPROSTOL 200 UG/1
800 TABLET ORAL ONCE AS NEEDED
Status: DISCONTINUED | OUTPATIENT
Start: 2021-11-02 | End: 2021-11-06 | Stop reason: HOSPADM

## 2021-11-02 RX ORDER — HYDROCORTISONE 25 MG/G
CREAM TOPICAL 3 TIMES DAILY PRN
Status: DISCONTINUED | OUTPATIENT
Start: 2021-11-02 | End: 2021-11-06 | Stop reason: HOSPADM

## 2021-11-02 RX ORDER — KETOROLAC TROMETHAMINE 30 MG/ML
30 INJECTION, SOLUTION INTRAMUSCULAR; INTRAVENOUS
Status: COMPLETED | OUTPATIENT
Start: 2021-11-02 | End: 2021-11-03

## 2021-11-02 RX ORDER — DIPHENHYDRAMINE HCL 25 MG
25 CAPSULE ORAL EVERY 6 HOURS PRN
Status: DISCONTINUED | OUTPATIENT
Start: 2021-11-02 | End: 2021-11-06 | Stop reason: HOSPADM

## 2021-11-02 RX ORDER — CARBOPROST TROMETHAMINE 250 UG/ML
250 INJECTION, SOLUTION INTRAMUSCULAR
Status: DISCONTINUED | OUTPATIENT
Start: 2021-11-02 | End: 2021-11-06 | Stop reason: HOSPADM

## 2021-11-02 RX ORDER — CEFAZOLIN SODIUM 2 G/50ML
2 SOLUTION INTRAVENOUS
Status: DISCONTINUED | OUTPATIENT
Start: 2021-11-02 | End: 2021-11-06 | Stop reason: HOSPADM

## 2021-11-02 RX ORDER — ACETAMINOPHEN 325 MG/1
650 TABLET ORAL EVERY 6 HOURS
Status: DISPENSED | OUTPATIENT
Start: 2021-11-03 | End: 2021-11-03

## 2021-11-02 RX ORDER — OXYTOCIN/RINGER'S LACTATE 30/500 ML
95 PLASTIC BAG, INJECTION (ML) INTRAVENOUS ONCE
Status: COMPLETED | OUTPATIENT
Start: 2021-11-02 | End: 2021-11-02

## 2021-11-02 RX ORDER — FAMOTIDINE 10 MG/ML
20 INJECTION INTRAVENOUS
Status: DISCONTINUED | OUTPATIENT
Start: 2021-11-02 | End: 2021-11-06 | Stop reason: HOSPADM

## 2021-11-02 RX ORDER — BUPIVACAINE HYDROCHLORIDE 7.5 MG/ML
INJECTION, SOLUTION INTRASPINAL
Status: DISCONTINUED | OUTPATIENT
Start: 2021-11-02 | End: 2021-11-02

## 2021-11-02 RX ORDER — SIMETHICONE 80 MG
1 TABLET,CHEWABLE ORAL EVERY 6 HOURS PRN
Status: DISCONTINUED | OUTPATIENT
Start: 2021-11-02 | End: 2021-11-06 | Stop reason: HOSPADM

## 2021-11-02 RX ORDER — NALBUPHINE HYDROCHLORIDE 10 MG/ML
5 INJECTION, SOLUTION INTRAMUSCULAR; INTRAVENOUS; SUBCUTANEOUS ONCE AS NEEDED
Status: DISCONTINUED | OUTPATIENT
Start: 2021-11-02 | End: 2021-11-06 | Stop reason: HOSPADM

## 2021-11-02 RX ORDER — SODIUM CITRATE AND CITRIC ACID MONOHYDRATE 334; 500 MG/5ML; MG/5ML
30 SOLUTION ORAL
Status: DISCONTINUED | OUTPATIENT
Start: 2021-11-02 | End: 2021-11-02

## 2021-11-02 RX ORDER — OXYCODONE HYDROCHLORIDE 5 MG/1
10 TABLET ORAL EVERY 4 HOURS PRN
Status: DISPENSED | OUTPATIENT
Start: 2021-11-02 | End: 2021-11-03

## 2021-11-02 RX ORDER — ONDANSETRON 8 MG/1
8 TABLET, ORALLY DISINTEGRATING ORAL EVERY 8 HOURS PRN
Status: DISCONTINUED | OUTPATIENT
Start: 2021-11-02 | End: 2021-11-06 | Stop reason: HOSPADM

## 2021-11-02 RX ORDER — OXYTOCIN 10 [USP'U]/ML
INJECTION, SOLUTION INTRAMUSCULAR; INTRAVENOUS
Status: DISCONTINUED | OUTPATIENT
Start: 2021-11-02 | End: 2021-11-02

## 2021-11-02 RX ORDER — ACETAMINOPHEN 325 MG/1
650 TABLET ORAL
Status: DISCONTINUED | OUTPATIENT
Start: 2021-11-02 | End: 2021-11-06 | Stop reason: HOSPADM

## 2021-11-02 RX ORDER — ONDANSETRON 2 MG/ML
4 INJECTION INTRAMUSCULAR; INTRAVENOUS EVERY 6 HOURS PRN
Status: DISCONTINUED | OUTPATIENT
Start: 2021-11-02 | End: 2021-11-02

## 2021-11-02 RX ORDER — MORPHINE SULFATE 0.5 MG/ML
INJECTION, SOLUTION EPIDURAL; INTRATHECAL; INTRAVENOUS
Status: DISCONTINUED | OUTPATIENT
Start: 2021-11-02 | End: 2021-11-02

## 2021-11-02 RX ORDER — OXYCODONE HYDROCHLORIDE 5 MG/1
5 TABLET ORAL EVERY 4 HOURS PRN
Status: DISPENSED | OUTPATIENT
Start: 2021-11-02 | End: 2021-11-03

## 2021-11-02 RX ORDER — PROCHLORPERAZINE EDISYLATE 5 MG/ML
5 INJECTION INTRAMUSCULAR; INTRAVENOUS EVERY 6 HOURS PRN
Status: DISCONTINUED | OUTPATIENT
Start: 2021-11-02 | End: 2021-11-06 | Stop reason: HOSPADM

## 2021-11-02 RX ORDER — ACETAMINOPHEN 500 MG
1000 TABLET ORAL
Status: DISCONTINUED | OUTPATIENT
Start: 2021-11-02 | End: 2021-11-06 | Stop reason: HOSPADM

## 2021-11-02 RX ORDER — MISOPROSTOL 200 UG/1
800 TABLET ORAL
Status: DISCONTINUED | OUTPATIENT
Start: 2021-11-02 | End: 2021-11-06 | Stop reason: HOSPADM

## 2021-11-02 RX ORDER — ACETAMINOPHEN 10 MG/ML
INJECTION, SOLUTION INTRAVENOUS
Status: DISCONTINUED | OUTPATIENT
Start: 2021-11-02 | End: 2021-11-02

## 2021-11-02 RX ORDER — ONDANSETRON 2 MG/ML
4 INJECTION INTRAMUSCULAR; INTRAVENOUS EVERY 6 HOURS PRN
Status: DISCONTINUED | OUTPATIENT
Start: 2021-11-02 | End: 2021-11-06 | Stop reason: HOSPADM

## 2021-11-02 RX ORDER — DIPHENHYDRAMINE HYDROCHLORIDE 50 MG/ML
12.5 INJECTION INTRAMUSCULAR; INTRAVENOUS
Status: DISCONTINUED | OUTPATIENT
Start: 2021-11-02 | End: 2021-11-06 | Stop reason: HOSPADM

## 2021-11-02 RX ORDER — FENTANYL CITRATE 50 UG/ML
INJECTION, SOLUTION INTRAMUSCULAR; INTRAVENOUS
Status: DISCONTINUED | OUTPATIENT
Start: 2021-11-02 | End: 2021-11-02

## 2021-11-02 RX ORDER — OXYCODONE HYDROCHLORIDE 5 MG/1
5 TABLET ORAL EVERY 4 HOURS PRN
Status: DISCONTINUED | OUTPATIENT
Start: 2021-11-02 | End: 2021-11-02

## 2021-11-02 RX ORDER — SODIUM CHLORIDE, SODIUM LACTATE, POTASSIUM CHLORIDE, CALCIUM CHLORIDE 600; 310; 30; 20 MG/100ML; MG/100ML; MG/100ML; MG/100ML
INJECTION, SOLUTION INTRAVENOUS CONTINUOUS PRN
Status: DISCONTINUED | OUTPATIENT
Start: 2021-11-02 | End: 2021-11-02

## 2021-11-02 RX ORDER — KETOROLAC TROMETHAMINE 30 MG/ML
30 INJECTION, SOLUTION INTRAMUSCULAR; INTRAVENOUS EVERY 6 HOURS
Status: DISCONTINUED | OUTPATIENT
Start: 2021-11-03 | End: 2021-11-02

## 2021-11-02 RX ADMIN — OXYTOCIN 6 UNITS: 10 INJECTION, SOLUTION INTRAMUSCULAR; INTRAVENOUS at 06:11

## 2021-11-02 RX ADMIN — ONDANSETRON HYDROCHLORIDE 4 MG: 2 INJECTION INTRAMUSCULAR; INTRAVENOUS at 05:11

## 2021-11-02 RX ADMIN — CEFAZOLIN 2 G: 330 INJECTION, POWDER, FOR SOLUTION INTRAMUSCULAR; INTRAVENOUS at 05:11

## 2021-11-02 RX ADMIN — FENTANYL CITRATE 10 MCG: 50 INJECTION, SOLUTION INTRAMUSCULAR; INTRAVENOUS at 05:11

## 2021-11-02 RX ADMIN — BUPIVACAINE HYDROCHLORIDE IN DEXTROSE 1.6 ML: 7.5 INJECTION, SOLUTION SUBARACHNOID at 05:11

## 2021-11-02 RX ADMIN — OXYCODONE 5 MG: 5 TABLET ORAL at 11:11

## 2021-11-02 RX ADMIN — PHENYLEPHRINE HYDROCHLORIDE 33.33 MCG/MIN: 10 INJECTION INTRAVENOUS at 05:11

## 2021-11-02 RX ADMIN — DEXAMETHASONE SODIUM PHOSPHATE 4 MG: 4 INJECTION, SOLUTION INTRAMUSCULAR; INTRAVENOUS at 05:11

## 2021-11-02 RX ADMIN — OXYTOCIN 6 UNITS: 10 INJECTION, SOLUTION INTRAMUSCULAR; INTRAVENOUS at 05:11

## 2021-11-02 RX ADMIN — Medication 0.1 MG: at 05:11

## 2021-11-02 RX ADMIN — OXYCODONE 5 MG: 5 TABLET ORAL at 09:11

## 2021-11-02 RX ADMIN — CHLOROPROCAINE HYDROCHLORIDE 5 ML: 20 INJECTION, SOLUTION EPIDURAL; INFILTRATION; INTRACAUDAL; PERINEURAL at 05:11

## 2021-11-02 RX ADMIN — SODIUM CHLORIDE, SODIUM LACTATE, POTASSIUM CHLORIDE, AND CALCIUM CHLORIDE: 600; 310; 30; 20 INJECTION, SOLUTION INTRAVENOUS at 05:11

## 2021-11-02 RX ADMIN — SODIUM CITRATE AND CITRIC ACID MONOHYDRATE 30 ML: 500; 334 SOLUTION ORAL at 05:11

## 2021-11-02 RX ADMIN — ACETAMINOPHEN 1000 MG: 10 INJECTION, SOLUTION INTRAVENOUS at 05:11

## 2021-11-02 RX ADMIN — FAMOTIDINE 20 MG: 10 INJECTION INTRAVENOUS at 05:11

## 2021-11-02 RX ADMIN — KETOROLAC TROMETHAMINE 30 MG: 30 INJECTION, SOLUTION INTRAMUSCULAR at 07:11

## 2021-11-02 RX ADMIN — Medication 95 MILLI-UNITS/MIN: at 07:11

## 2021-11-03 LAB
ALLENS TEST: ABNORMAL
BASOPHILS # BLD AUTO: 0.03 K/UL (ref 0–0.2)
BASOPHILS NFR BLD: 0.2 % (ref 0–1.9)
DELSYS: ABNORMAL
DIFFERENTIAL METHOD: ABNORMAL
EOSINOPHIL # BLD AUTO: 0 K/UL (ref 0–0.5)
EOSINOPHIL NFR BLD: 0.1 % (ref 0–8)
ERYTHROCYTE [DISTWIDTH] IN BLOOD BY AUTOMATED COUNT: 14.3 % (ref 11.5–14.5)
HCO3 UR-SCNC: 28 MMOL/L (ref 24–28)
HCT VFR BLD AUTO: 31.9 % (ref 37–48.5)
HCT VFR BLD CALC: 62 %PCV (ref 36–54)
HGB BLD-MCNC: 10.4 G/DL (ref 12–16)
HGB BLD-MCNC: 21 G/DL
IMM GRANULOCYTES # BLD AUTO: 0.07 K/UL (ref 0–0.04)
IMM GRANULOCYTES NFR BLD AUTO: 0.5 % (ref 0–0.5)
LYMPHOCYTES # BLD AUTO: 2.1 K/UL (ref 1–4.8)
LYMPHOCYTES NFR BLD: 13.5 % (ref 18–48)
MCH RBC QN AUTO: 28.4 PG (ref 27–31)
MCHC RBC AUTO-ENTMCNC: 32.6 G/DL (ref 32–36)
MCV RBC AUTO: 87 FL (ref 82–98)
MODE: ABNORMAL
MONOCYTES # BLD AUTO: 0.9 K/UL (ref 0.3–1)
MONOCYTES NFR BLD: 5.6 % (ref 4–15)
NEUTROPHILS # BLD AUTO: 12.3 K/UL (ref 1.8–7.7)
NEUTROPHILS NFR BLD: 80.1 % (ref 38–73)
NRBC BLD-RTO: 0 /100 WBC
PCO2 BLDA: 73.9 MMHG (ref 35–45)
PH SMN: 7.19 [PH] (ref 7.35–7.45)
PLATELET # BLD AUTO: 147 K/UL (ref 150–450)
PMV BLD AUTO: 12.9 FL (ref 9.2–12.9)
PO2 BLDA: 8 MMHG (ref 80–100)
POC BE: 0 MMOL/L
POC IONIZED CALCIUM: 1.43 MMOL/L (ref 1.06–1.42)
POC SATURATED O2: 5 % (ref 95–100)
POC TCO2: 30 MMOL/L (ref 23–27)
POTASSIUM BLD-SCNC: 4.1 MMOL/L (ref 3.5–5.1)
RBC # BLD AUTO: 3.66 M/UL (ref 4–5.4)
RPR SER QL: NORMAL
SAMPLE: ABNORMAL
SITE: ABNORMAL
SODIUM BLD-SCNC: 130 MMOL/L (ref 136–145)
WBC # BLD AUTO: 15.3 K/UL (ref 3.9–12.7)

## 2021-11-03 PROCEDURE — 85025 COMPLETE CBC W/AUTO DIFF WBC: CPT | Performed by: STUDENT IN AN ORGANIZED HEALTH CARE EDUCATION/TRAINING PROGRAM

## 2021-11-03 PROCEDURE — 25000003 PHARM REV CODE 250: Performed by: STUDENT IN AN ORGANIZED HEALTH CARE EDUCATION/TRAINING PROGRAM

## 2021-11-03 PROCEDURE — 63600175 PHARM REV CODE 636 W HCPCS: Performed by: STUDENT IN AN ORGANIZED HEALTH CARE EDUCATION/TRAINING PROGRAM

## 2021-11-03 PROCEDURE — 25000003 PHARM REV CODE 250: Performed by: ANESTHESIOLOGY

## 2021-11-03 PROCEDURE — 11000001 HC ACUTE MED/SURG PRIVATE ROOM

## 2021-11-03 PROCEDURE — 36415 COLL VENOUS BLD VENIPUNCTURE: CPT | Performed by: STUDENT IN AN ORGANIZED HEALTH CARE EDUCATION/TRAINING PROGRAM

## 2021-11-03 RX ORDER — OXYCODONE HYDROCHLORIDE 5 MG/1
5 TABLET ORAL EVERY 4 HOURS PRN
Status: DISPENSED | OUTPATIENT
Start: 2021-11-03 | End: 2021-11-04

## 2021-11-03 RX ORDER — OXYCODONE HYDROCHLORIDE 5 MG/1
10 TABLET ORAL EVERY 4 HOURS PRN
Status: ACTIVE | OUTPATIENT
Start: 2021-11-03 | End: 2021-11-04

## 2021-11-03 RX ADMIN — ENOXAPARIN SODIUM 40 MG: 40 INJECTION SUBCUTANEOUS at 04:11

## 2021-11-03 RX ADMIN — Medication 80 MG: at 04:11

## 2021-11-03 RX ADMIN — PRENATAL VIT W/ FE FUMARATE-FA TAB 27-0.8 MG 1 TABLET: 27-0.8 TAB at 08:11

## 2021-11-03 RX ADMIN — KETOROLAC TROMETHAMINE 30 MG: 30 INJECTION, SOLUTION INTRAMUSCULAR at 07:11

## 2021-11-03 RX ADMIN — KETOROLAC TROMETHAMINE 30 MG: 30 INJECTION, SOLUTION INTRAMUSCULAR at 01:11

## 2021-11-03 RX ADMIN — ACETAMINOPHEN 650 MG: 325 TABLET, FILM COATED ORAL at 01:11

## 2021-11-03 RX ADMIN — ACETAMINOPHEN 650 MG: 325 TABLET, FILM COATED ORAL at 12:11

## 2021-11-03 RX ADMIN — OXYCODONE 10 MG: 5 TABLET ORAL at 10:11

## 2021-11-03 RX ADMIN — OXYCODONE 5 MG: 5 TABLET ORAL at 10:11

## 2021-11-03 RX ADMIN — DOCUSATE SODIUM 200 MG: 100 CAPSULE, LIQUID FILLED ORAL at 08:11

## 2021-11-03 RX ADMIN — DOCUSATE SODIUM 200 MG: 100 CAPSULE, LIQUID FILLED ORAL at 07:11

## 2021-11-03 RX ADMIN — ACETAMINOPHEN 650 MG: 325 TABLET, FILM COATED ORAL at 06:11

## 2021-11-03 RX ADMIN — SODIUM CHLORIDE, SODIUM LACTATE, POTASSIUM CHLORIDE, AND CALCIUM CHLORIDE 1000 ML: .6; .31; .03; .02 INJECTION, SOLUTION INTRAVENOUS at 04:11

## 2021-11-03 RX ADMIN — IBUPROFEN 800 MG: 400 TABLET, FILM COATED ORAL at 06:11

## 2021-11-04 PROCEDURE — 11000001 HC ACUTE MED/SURG PRIVATE ROOM

## 2021-11-04 PROCEDURE — 63600175 PHARM REV CODE 636 W HCPCS: Performed by: STUDENT IN AN ORGANIZED HEALTH CARE EDUCATION/TRAINING PROGRAM

## 2021-11-04 PROCEDURE — 25000003 PHARM REV CODE 250: Performed by: STUDENT IN AN ORGANIZED HEALTH CARE EDUCATION/TRAINING PROGRAM

## 2021-11-04 RX ADMIN — ACETAMINOPHEN 650 MG: 325 TABLET, FILM COATED ORAL at 10:11

## 2021-11-04 RX ADMIN — ACETAMINOPHEN 650 MG: 325 TABLET, FILM COATED ORAL at 12:11

## 2021-11-04 RX ADMIN — IBUPROFEN 800 MG: 400 TABLET, FILM COATED ORAL at 02:11

## 2021-11-04 RX ADMIN — ACETAMINOPHEN 650 MG: 325 TABLET, FILM COATED ORAL at 07:11

## 2021-11-04 RX ADMIN — IBUPROFEN 800 MG: 400 TABLET, FILM COATED ORAL at 10:11

## 2021-11-04 RX ADMIN — ACETAMINOPHEN 650 MG: 325 TABLET, FILM COATED ORAL at 06:11

## 2021-11-04 RX ADMIN — DOCUSATE SODIUM 200 MG: 100 CAPSULE, LIQUID FILLED ORAL at 08:11

## 2021-11-04 RX ADMIN — Medication 80 MG: at 12:11

## 2021-11-04 RX ADMIN — ENOXAPARIN SODIUM 40 MG: 40 INJECTION SUBCUTANEOUS at 06:11

## 2021-11-04 RX ADMIN — Medication 80 MG: at 11:11

## 2021-11-04 RX ADMIN — DOCUSATE SODIUM 200 MG: 100 CAPSULE, LIQUID FILLED ORAL at 07:11

## 2021-11-04 RX ADMIN — GUAIFENESIN AND DEXTROMETHORPHAN HYDROBROMIDE 1 TABLET: 600; 30 TABLET, EXTENDED RELEASE ORAL at 01:11

## 2021-11-04 RX ADMIN — PRENATAL VIT W/ FE FUMARATE-FA TAB 27-0.8 MG 1 TABLET: 27-0.8 TAB at 08:11

## 2021-11-04 RX ADMIN — IBUPROFEN 800 MG: 400 TABLET, FILM COATED ORAL at 06:11

## 2021-11-05 PROBLEM — Z98.891 S/P PRIMARY LOW TRANSVERSE C-SECTION: Status: ACTIVE | Noted: 2021-11-05

## 2021-11-05 PROCEDURE — 11000001 HC ACUTE MED/SURG PRIVATE ROOM

## 2021-11-05 PROCEDURE — 25000003 PHARM REV CODE 250

## 2021-11-05 PROCEDURE — 63600175 PHARM REV CODE 636 W HCPCS: Performed by: STUDENT IN AN ORGANIZED HEALTH CARE EDUCATION/TRAINING PROGRAM

## 2021-11-05 PROCEDURE — 25000003 PHARM REV CODE 250: Performed by: STUDENT IN AN ORGANIZED HEALTH CARE EDUCATION/TRAINING PROGRAM

## 2021-11-05 RX ORDER — OXYCODONE HYDROCHLORIDE 5 MG/1
10 TABLET ORAL EVERY 6 HOURS PRN
Status: DISCONTINUED | OUTPATIENT
Start: 2021-11-05 | End: 2021-11-06 | Stop reason: HOSPADM

## 2021-11-05 RX ORDER — POLYETHYLENE GLYCOL 3350 17 G/17G
17 POWDER, FOR SOLUTION ORAL DAILY
Status: DISCONTINUED | OUTPATIENT
Start: 2021-11-05 | End: 2021-11-06 | Stop reason: HOSPADM

## 2021-11-05 RX ORDER — OXYCODONE HYDROCHLORIDE 5 MG/1
5 TABLET ORAL EVERY 6 HOURS PRN
Status: DISCONTINUED | OUTPATIENT
Start: 2021-11-05 | End: 2021-11-06 | Stop reason: HOSPADM

## 2021-11-05 RX ADMIN — ENOXAPARIN SODIUM 40 MG: 40 INJECTION SUBCUTANEOUS at 07:11

## 2021-11-05 RX ADMIN — IBUPROFEN 800 MG: 400 TABLET, FILM COATED ORAL at 10:11

## 2021-11-05 RX ADMIN — OXYCODONE 5 MG: 5 TABLET ORAL at 03:11

## 2021-11-05 RX ADMIN — DOCUSATE SODIUM 200 MG: 100 CAPSULE, LIQUID FILLED ORAL at 09:11

## 2021-11-05 RX ADMIN — DOCUSATE SODIUM 200 MG: 100 CAPSULE, LIQUID FILLED ORAL at 10:11

## 2021-11-05 RX ADMIN — Medication 80 MG: at 03:11

## 2021-11-05 RX ADMIN — ACETAMINOPHEN 650 MG: 325 TABLET, FILM COATED ORAL at 11:11

## 2021-11-05 RX ADMIN — ACETAMINOPHEN 650 MG: 325 TABLET, FILM COATED ORAL at 06:11

## 2021-11-05 RX ADMIN — IBUPROFEN 800 MG: 400 TABLET, FILM COATED ORAL at 07:11

## 2021-11-05 RX ADMIN — ACETAMINOPHEN 650 MG: 325 TABLET, FILM COATED ORAL at 07:11

## 2021-11-05 RX ADMIN — POLYETHYLENE GLYCOL 3350 17 G: 17 POWDER, FOR SOLUTION ORAL at 10:11

## 2021-11-05 RX ADMIN — SENNOSIDES,DOCUSATE SODIUM 1 TABLET: 8.6; 5 TABLET, FILM COATED ORAL at 09:11

## 2021-11-06 VITALS
HEART RATE: 76 BPM | RESPIRATION RATE: 18 BRPM | DIASTOLIC BLOOD PRESSURE: 65 MMHG | SYSTOLIC BLOOD PRESSURE: 116 MMHG | TEMPERATURE: 98 F | OXYGEN SATURATION: 96 %

## 2021-11-06 PROCEDURE — 25000003 PHARM REV CODE 250: Performed by: STUDENT IN AN ORGANIZED HEALTH CARE EDUCATION/TRAINING PROGRAM

## 2021-11-06 RX ORDER — DOCUSATE SODIUM 100 MG/1
100 CAPSULE, LIQUID FILLED ORAL 2 TIMES DAILY PRN
Qty: 30 CAPSULE | Refills: 3 | Status: SHIPPED | OUTPATIENT
Start: 2021-11-06 | End: 2023-06-12 | Stop reason: ALTCHOICE

## 2021-11-06 RX ORDER — CETIRIZINE HYDROCHLORIDE 5 MG/1
5 TABLET ORAL DAILY
Status: DISCONTINUED | OUTPATIENT
Start: 2021-11-06 | End: 2021-11-06 | Stop reason: HOSPADM

## 2021-11-06 RX ORDER — OXYCODONE AND ACETAMINOPHEN 5; 325 MG/1; MG/1
1 TABLET ORAL EVERY 6 HOURS PRN
Qty: 20 TABLET | Refills: 0 | Status: SHIPPED | OUTPATIENT
Start: 2021-11-06 | End: 2021-12-13

## 2021-11-06 RX ORDER — IBUPROFEN 600 MG/1
600 TABLET ORAL EVERY 6 HOURS PRN
Qty: 30 TABLET | Refills: 3 | Status: SHIPPED | OUTPATIENT
Start: 2021-11-06 | End: 2022-03-14 | Stop reason: SDUPTHER

## 2021-11-06 RX ADMIN — IBUPROFEN 800 MG: 400 TABLET, FILM COATED ORAL at 10:11

## 2021-11-06 RX ADMIN — PRENATAL VIT W/ FE FUMARATE-FA TAB 27-0.8 MG 1 TABLET: 27-0.8 TAB at 08:11

## 2021-11-06 RX ADMIN — IBUPROFEN 800 MG: 400 TABLET, FILM COATED ORAL at 03:11

## 2021-11-06 RX ADMIN — ACETAMINOPHEN 650 MG: 325 TABLET, FILM COATED ORAL at 01:11

## 2021-11-06 RX ADMIN — ACETAMINOPHEN 650 MG: 325 TABLET, FILM COATED ORAL at 07:11

## 2021-11-06 RX ADMIN — Medication 80 MG: at 08:11

## 2021-11-06 RX ADMIN — DOCUSATE SODIUM 200 MG: 100 CAPSULE, LIQUID FILLED ORAL at 08:11

## 2021-11-06 RX ADMIN — CETIRIZINE HYDROCHLORIDE 5 MG: 5 TABLET, FILM COATED ORAL at 10:11

## 2021-11-06 RX ADMIN — ACETAMINOPHEN 650 MG: 325 TABLET, FILM COATED ORAL at 12:11

## 2021-11-06 RX ADMIN — POLYETHYLENE GLYCOL 3350 17 G: 17 POWDER, FOR SOLUTION ORAL at 08:11

## 2021-11-08 ENCOUNTER — PATIENT MESSAGE (OUTPATIENT)
Dept: OBSTETRICS AND GYNECOLOGY | Facility: CLINIC | Age: 41
End: 2021-11-08
Payer: COMMERCIAL

## 2021-11-09 LAB
FINAL PATHOLOGIC DIAGNOSIS: NORMAL
Lab: NORMAL

## 2021-11-15 ENCOUNTER — POSTPARTUM VISIT (OUTPATIENT)
Dept: OBSTETRICS AND GYNECOLOGY | Facility: CLINIC | Age: 41
End: 2021-11-15
Attending: OBSTETRICS & GYNECOLOGY
Payer: COMMERCIAL

## 2021-11-15 ENCOUNTER — PATIENT MESSAGE (OUTPATIENT)
Dept: OBSTETRICS AND GYNECOLOGY | Facility: CLINIC | Age: 41
End: 2021-11-15

## 2021-11-15 VITALS — BODY MASS INDEX: 28.24 KG/M2 | WEIGHT: 149.5 LBS

## 2021-11-15 DIAGNOSIS — T81.89XA PROBLEM INVOLVING SURGICAL INCISION: Primary | ICD-10-CM

## 2021-11-15 DIAGNOSIS — O92.29 PAIN OF BREAST DURING BREASTFEEDING: ICD-10-CM

## 2021-11-15 PROCEDURE — 87075 CULTR BACTERIA EXCEPT BLOOD: CPT | Performed by: OBSTETRICS & GYNECOLOGY

## 2021-11-15 PROCEDURE — 87070 CULTURE OTHR SPECIMN AEROBIC: CPT | Performed by: OBSTETRICS & GYNECOLOGY

## 2021-11-15 PROCEDURE — 99213 OFFICE O/P EST LOW 20 MIN: CPT | Mod: 24,S$GLB,, | Performed by: OBSTETRICS & GYNECOLOGY

## 2021-11-15 PROCEDURE — 87077 CULTURE AEROBIC IDENTIFY: CPT | Mod: 59 | Performed by: OBSTETRICS & GYNECOLOGY

## 2021-11-15 PROCEDURE — 99213 PR OFFICE/OUTPT VISIT, EST, LEVL III, 20-29 MIN: ICD-10-PCS | Mod: 24,S$GLB,, | Performed by: OBSTETRICS & GYNECOLOGY

## 2021-11-15 PROCEDURE — 87186 SC STD MICRODIL/AGAR DIL: CPT | Mod: 59 | Performed by: OBSTETRICS & GYNECOLOGY

## 2021-11-15 PROCEDURE — 87076 CULTURE ANAEROBE IDENT EACH: CPT | Performed by: OBSTETRICS & GYNECOLOGY

## 2021-11-15 PROCEDURE — 99999 PR PBB SHADOW E&M-EST. PATIENT-LVL II: ICD-10-PCS | Mod: PBBFAC,,, | Performed by: OBSTETRICS & GYNECOLOGY

## 2021-11-15 PROCEDURE — 99999 PR PBB SHADOW E&M-EST. PATIENT-LVL II: CPT | Mod: PBBFAC,,, | Performed by: OBSTETRICS & GYNECOLOGY

## 2021-11-15 PROCEDURE — 1111F PR DISCHARGE MEDS RECONCILED W/ CURRENT OUTPATIENT MED LIST: ICD-10-PCS | Mod: CPTII,S$GLB,, | Performed by: OBSTETRICS & GYNECOLOGY

## 2021-11-15 PROCEDURE — 1111F DSCHRG MED/CURRENT MED MERGE: CPT | Mod: CPTII,S$GLB,, | Performed by: OBSTETRICS & GYNECOLOGY

## 2021-11-15 RX ORDER — CLINDAMYCIN HYDROCHLORIDE 300 MG/1
300 CAPSULE ORAL EVERY 8 HOURS
Qty: 30 CAPSULE | Refills: 0 | Status: SHIPPED | OUTPATIENT
Start: 2021-11-15 | End: 2021-11-18

## 2021-11-15 RX ORDER — LECITHIN, SOY 518 MG
1 CAPSULE ORAL 3 TIMES DAILY
Qty: 90 EACH | Refills: 3 | Status: SHIPPED | OUTPATIENT
Start: 2021-11-15 | End: 2021-11-29

## 2021-11-15 RX ORDER — SULFAMETHOXAZOLE AND TRIMETHOPRIM 800; 160 MG/1; MG/1
1 TABLET ORAL 2 TIMES DAILY
Qty: 20 TABLET | Refills: 0 | Status: SHIPPED | OUTPATIENT
Start: 2021-11-15 | End: 2021-11-15

## 2021-11-16 ENCOUNTER — TELEPHONE (OUTPATIENT)
Dept: OBSTETRICS AND GYNECOLOGY | Facility: CLINIC | Age: 41
End: 2021-11-16
Payer: COMMERCIAL

## 2021-11-18 ENCOUNTER — POSTPARTUM VISIT (OUTPATIENT)
Dept: OBSTETRICS AND GYNECOLOGY | Facility: CLINIC | Age: 41
End: 2021-11-18
Attending: OBSTETRICS & GYNECOLOGY
Payer: COMMERCIAL

## 2021-11-18 VITALS — DIASTOLIC BLOOD PRESSURE: 60 MMHG | SYSTOLIC BLOOD PRESSURE: 120 MMHG | BODY MASS INDEX: 28.7 KG/M2 | WEIGHT: 151.88 LBS

## 2021-11-18 DIAGNOSIS — T81.41XA SUPERFICIAL INCISIONAL INFECTION OF SURGICAL SITE: Primary | ICD-10-CM

## 2021-11-18 PROBLEM — O28.8 NON-REACTIVE NST (NON-STRESS TEST): Status: RESOLVED | Noted: 2021-10-26 | Resolved: 2021-11-18

## 2021-11-18 PROBLEM — O36.8390 NON-REASSURING ELECTRONIC FETAL MONITORING TRACING: Status: RESOLVED | Noted: 2021-10-25 | Resolved: 2021-11-18

## 2021-11-18 PROCEDURE — 1111F DSCHRG MED/CURRENT MED MERGE: CPT | Mod: CPTII,S$GLB,, | Performed by: OBSTETRICS & GYNECOLOGY

## 2021-11-18 PROCEDURE — 99999 PR PBB SHADOW E&M-EST. PATIENT-LVL III: CPT | Mod: PBBFAC,,, | Performed by: OBSTETRICS & GYNECOLOGY

## 2021-11-18 PROCEDURE — 99213 PR OFFICE/OUTPT VISIT, EST, LEVL III, 20-29 MIN: ICD-10-PCS | Mod: 24,S$GLB,, | Performed by: OBSTETRICS & GYNECOLOGY

## 2021-11-18 PROCEDURE — 99999 PR PBB SHADOW E&M-EST. PATIENT-LVL III: ICD-10-PCS | Mod: PBBFAC,,, | Performed by: OBSTETRICS & GYNECOLOGY

## 2021-11-18 PROCEDURE — 99213 OFFICE O/P EST LOW 20 MIN: CPT | Mod: 24,S$GLB,, | Performed by: OBSTETRICS & GYNECOLOGY

## 2021-11-18 PROCEDURE — 1111F PR DISCHARGE MEDS RECONCILED W/ CURRENT OUTPATIENT MED LIST: ICD-10-PCS | Mod: CPTII,S$GLB,, | Performed by: OBSTETRICS & GYNECOLOGY

## 2021-11-18 RX ORDER — AMOXICILLIN AND CLAVULANATE POTASSIUM 875; 125 MG/1; MG/1
1 TABLET, FILM COATED ORAL 2 TIMES DAILY
Qty: 28 TABLET | Refills: 0 | Status: SHIPPED | OUTPATIENT
Start: 2021-11-18 | End: 2021-11-29 | Stop reason: ALTCHOICE

## 2021-11-20 LAB
BACTERIA SPEC AEROBE CULT: ABNORMAL
BACTERIA SPEC AEROBE CULT: ABNORMAL

## 2021-11-22 ENCOUNTER — DOCUMENTATION ONLY (OUTPATIENT)
Dept: OBSTETRICS AND GYNECOLOGY | Facility: CLINIC | Age: 41
End: 2021-11-22
Payer: COMMERCIAL

## 2021-11-22 DIAGNOSIS — R06.2 WHEEZING: Primary | ICD-10-CM

## 2021-11-22 LAB — BACTERIA SPEC ANAEROBE CULT: ABNORMAL

## 2021-11-22 RX ORDER — ALBUTEROL SULFATE 90 UG/1
2 AEROSOL, METERED RESPIRATORY (INHALATION) EVERY 6 HOURS PRN
Qty: 8.5 G | Refills: 0 | Status: SHIPPED | OUTPATIENT
Start: 2021-11-22 | End: 2021-11-29 | Stop reason: SDUPTHER

## 2021-11-29 ENCOUNTER — POSTPARTUM VISIT (OUTPATIENT)
Dept: OBSTETRICS AND GYNECOLOGY | Facility: CLINIC | Age: 41
End: 2021-11-29
Attending: OBSTETRICS & GYNECOLOGY
Payer: COMMERCIAL

## 2021-11-29 ENCOUNTER — OFFICE VISIT (OUTPATIENT)
Dept: FAMILY MEDICINE | Facility: CLINIC | Age: 41
End: 2021-11-29
Payer: COMMERCIAL

## 2021-11-29 VITALS
BODY MASS INDEX: 28.39 KG/M2 | SYSTOLIC BLOOD PRESSURE: 116 MMHG | WEIGHT: 150.38 LBS | HEIGHT: 61 IN | DIASTOLIC BLOOD PRESSURE: 74 MMHG | HEART RATE: 74 BPM | OXYGEN SATURATION: 98 %

## 2021-11-29 VITALS
HEIGHT: 61 IN | SYSTOLIC BLOOD PRESSURE: 118 MMHG | DIASTOLIC BLOOD PRESSURE: 74 MMHG | BODY MASS INDEX: 28.26 KG/M2 | WEIGHT: 149.69 LBS

## 2021-11-29 DIAGNOSIS — R06.2 WHEEZING: ICD-10-CM

## 2021-11-29 DIAGNOSIS — Z98.891 S/P PRIMARY LOW TRANSVERSE C-SECTION: ICD-10-CM

## 2021-11-29 DIAGNOSIS — R05.8 NOCTURNAL COUGH WITH WHEEZE: Primary | ICD-10-CM

## 2021-11-29 DIAGNOSIS — R06.2 NOCTURNAL COUGH WITH WHEEZE: Primary | ICD-10-CM

## 2021-11-29 DIAGNOSIS — J98.01 COUGH DUE TO BRONCHOSPASM: ICD-10-CM

## 2021-11-29 DIAGNOSIS — J30.89 NON-SEASONAL ALLERGIC RHINITIS, UNSPECIFIED TRIGGER: ICD-10-CM

## 2021-11-29 DIAGNOSIS — K21.9 GASTROESOPHAGEAL REFLUX DISEASE, UNSPECIFIED WHETHER ESOPHAGITIS PRESENT: ICD-10-CM

## 2021-11-29 DIAGNOSIS — E55.9 VITAMIN D DEFICIENCY: ICD-10-CM

## 2021-11-29 PROCEDURE — 99215 PR OFFICE/OUTPT VISIT, EST, LEVL V, 40-54 MIN: ICD-10-PCS | Mod: S$GLB,,, | Performed by: FAMILY MEDICINE

## 2021-11-29 PROCEDURE — 99215 OFFICE O/P EST HI 40 MIN: CPT | Mod: S$GLB,,, | Performed by: FAMILY MEDICINE

## 2021-11-29 PROCEDURE — 99999 PR PBB SHADOW E&M-EST. PATIENT-LVL III: ICD-10-PCS | Mod: PBBFAC,,, | Performed by: OBSTETRICS & GYNECOLOGY

## 2021-11-29 PROCEDURE — 99999 PR PBB SHADOW E&M-EST. PATIENT-LVL III: CPT | Mod: PBBFAC,,, | Performed by: FAMILY MEDICINE

## 2021-11-29 PROCEDURE — 99999 PR PBB SHADOW E&M-EST. PATIENT-LVL III: ICD-10-PCS | Mod: PBBFAC,,, | Performed by: FAMILY MEDICINE

## 2021-11-29 PROCEDURE — 99999 PR PBB SHADOW E&M-EST. PATIENT-LVL III: CPT | Mod: PBBFAC,,, | Performed by: OBSTETRICS & GYNECOLOGY

## 2021-11-29 RX ORDER — ACETAMINOPHEN 500 MG
5000 TABLET ORAL
Qty: 90 TABLET | Refills: 3 | Status: SHIPPED | OUTPATIENT
Start: 2021-11-29 | End: 2022-10-23 | Stop reason: SDUPTHER

## 2021-11-29 RX ORDER — AZELASTINE 1 MG/ML
1 SPRAY, METERED NASAL 2 TIMES DAILY
Qty: 30 ML | Refills: 11 | Status: SHIPPED | OUTPATIENT
Start: 2021-11-29 | End: 2023-06-12

## 2021-11-29 RX ORDER — FAMOTIDINE 40 MG/1
40 TABLET, FILM COATED ORAL NIGHTLY
Qty: 90 TABLET | Refills: 4 | Status: SHIPPED | OUTPATIENT
Start: 2021-11-29 | End: 2022-11-03

## 2021-11-29 RX ORDER — ALBUTEROL SULFATE 90 UG/1
2 AEROSOL, METERED RESPIRATORY (INHALATION) EVERY 6 HOURS PRN
Qty: 8.5 G | Refills: 11 | Status: SHIPPED | OUTPATIENT
Start: 2021-11-29 | End: 2022-02-02 | Stop reason: SDUPTHER

## 2021-12-13 ENCOUNTER — POSTPARTUM VISIT (OUTPATIENT)
Dept: OBSTETRICS AND GYNECOLOGY | Facility: CLINIC | Age: 41
End: 2021-12-13
Attending: OBSTETRICS & GYNECOLOGY
Payer: COMMERCIAL

## 2021-12-13 VITALS
SYSTOLIC BLOOD PRESSURE: 110 MMHG | DIASTOLIC BLOOD PRESSURE: 62 MMHG | BODY MASS INDEX: 27.91 KG/M2 | WEIGHT: 147.69 LBS

## 2021-12-13 PROCEDURE — 0503F PR POSTPARTUM CARE VISIT: ICD-10-PCS | Mod: CPTII,S$GLB,, | Performed by: OBSTETRICS & GYNECOLOGY

## 2021-12-13 PROCEDURE — 99999 PR PBB SHADOW E&M-EST. PATIENT-LVL III: CPT | Mod: PBBFAC,,, | Performed by: OBSTETRICS & GYNECOLOGY

## 2021-12-13 PROCEDURE — 99999 PR PBB SHADOW E&M-EST. PATIENT-LVL III: ICD-10-PCS | Mod: PBBFAC,,, | Performed by: OBSTETRICS & GYNECOLOGY

## 2021-12-13 PROCEDURE — 0503F POSTPARTUM CARE VISIT: CPT | Mod: CPTII,S$GLB,, | Performed by: OBSTETRICS & GYNECOLOGY

## 2021-12-13 NOTE — PROGRESS NOTES
Daya Limon is a 41 y.o.  who presents for a postpartum visit.  She is status post   delivery secondary to NRF status 6 weeks ago.  Her hospitalization was not complicated.  Recovery complicated by superficial incisional cellulitis.  Doing much better.  Pain is improving        Past Medical History:   Diagnosis Date    Body mass index (BMI) 24.0-24.9, adult 2016    GERD (gastroesophageal reflux disease) 2017    Meibomian gland dysfunction (MGD)     S/P primary low transverse  2021    Thyroid nodule     Vitamin D deficiency 2017       Objective:     /62   Wt 67 kg (147 lb 11.3 oz)   Breastfeeding Yes   BMI 27.91 kg/m²     General: healthy, alert, no distress  Abdomen: Normal, benign., Positive findings: incision which is well healed with no drainage or opening. and no masses, hepatosplenomegaly, no hernias  Uterus: normal size, well involuted, firm, non-tender  Adnexa: no masses palpable and nontender  Psych: BEHAVIOR: cooperative, MOOD: appropriate  Assessment:     There are no diagnoses linked to this encounter.    Plan:     1. Return to clinic in 3-6 months for annual exam

## 2022-01-10 ENCOUNTER — PATIENT MESSAGE (OUTPATIENT)
Dept: ADMINISTRATIVE | Facility: HOSPITAL | Age: 42
End: 2022-01-10
Payer: COMMERCIAL

## 2022-02-02 ENCOUNTER — OFFICE VISIT (OUTPATIENT)
Dept: FAMILY MEDICINE | Facility: CLINIC | Age: 42
End: 2022-02-02
Payer: COMMERCIAL

## 2022-02-02 VITALS
DIASTOLIC BLOOD PRESSURE: 61 MMHG | HEIGHT: 61 IN | WEIGHT: 148.81 LBS | HEART RATE: 78 BPM | SYSTOLIC BLOOD PRESSURE: 96 MMHG | OXYGEN SATURATION: 98 % | BODY MASS INDEX: 28.1 KG/M2

## 2022-02-02 DIAGNOSIS — K21.9 GASTROESOPHAGEAL REFLUX DISEASE, UNSPECIFIED WHETHER ESOPHAGITIS PRESENT: ICD-10-CM

## 2022-02-02 DIAGNOSIS — H01.139 ATOPIC DERMATITIS OF EYELID, UNSPECIFIED LATERALITY: ICD-10-CM

## 2022-02-02 DIAGNOSIS — R05.8 NOCTURNAL COUGH WITH WHEEZE: ICD-10-CM

## 2022-02-02 DIAGNOSIS — R06.2 NOCTURNAL COUGH WITH WHEEZE: ICD-10-CM

## 2022-02-02 DIAGNOSIS — L20.9 ATOPIC DERMATITIS, UNSPECIFIED TYPE: ICD-10-CM

## 2022-02-02 DIAGNOSIS — J98.01 COUGH DUE TO BRONCHOSPASM: ICD-10-CM

## 2022-02-02 DIAGNOSIS — E55.9 VITAMIN D DEFICIENCY: ICD-10-CM

## 2022-02-02 DIAGNOSIS — R06.2 WHEEZING: ICD-10-CM

## 2022-02-02 DIAGNOSIS — L30.9 ECZEMA OF LEFT HAND: Primary | ICD-10-CM

## 2022-02-02 PROCEDURE — 3074F SYST BP LT 130 MM HG: CPT | Mod: CPTII,S$GLB,, | Performed by: FAMILY MEDICINE

## 2022-02-02 PROCEDURE — 1159F MED LIST DOCD IN RCRD: CPT | Mod: CPTII,S$GLB,, | Performed by: FAMILY MEDICINE

## 2022-02-02 PROCEDURE — 3078F DIAST BP <80 MM HG: CPT | Mod: CPTII,S$GLB,, | Performed by: FAMILY MEDICINE

## 2022-02-02 PROCEDURE — 3074F PR MOST RECENT SYSTOLIC BLOOD PRESSURE < 130 MM HG: ICD-10-PCS | Mod: CPTII,S$GLB,, | Performed by: FAMILY MEDICINE

## 2022-02-02 PROCEDURE — 3008F BODY MASS INDEX DOCD: CPT | Mod: CPTII,S$GLB,, | Performed by: FAMILY MEDICINE

## 2022-02-02 PROCEDURE — 1159F PR MEDICATION LIST DOCUMENTED IN MEDICAL RECORD: ICD-10-PCS | Mod: CPTII,S$GLB,, | Performed by: FAMILY MEDICINE

## 2022-02-02 PROCEDURE — 99214 PR OFFICE/OUTPT VISIT, EST, LEVL IV, 30-39 MIN: ICD-10-PCS | Mod: S$GLB,,, | Performed by: FAMILY MEDICINE

## 2022-02-02 PROCEDURE — 3008F PR BODY MASS INDEX (BMI) DOCUMENTED: ICD-10-PCS | Mod: CPTII,S$GLB,, | Performed by: FAMILY MEDICINE

## 2022-02-02 PROCEDURE — 1160F RVW MEDS BY RX/DR IN RCRD: CPT | Mod: CPTII,S$GLB,, | Performed by: FAMILY MEDICINE

## 2022-02-02 PROCEDURE — 99214 OFFICE O/P EST MOD 30 MIN: CPT | Mod: S$GLB,,, | Performed by: FAMILY MEDICINE

## 2022-02-02 PROCEDURE — 99999 PR PBB SHADOW E&M-EST. PATIENT-LVL IV: ICD-10-PCS | Mod: PBBFAC,,, | Performed by: FAMILY MEDICINE

## 2022-02-02 PROCEDURE — 1160F PR REVIEW ALL MEDS BY PRESCRIBER/CLIN PHARMACIST DOCUMENTED: ICD-10-PCS | Mod: CPTII,S$GLB,, | Performed by: FAMILY MEDICINE

## 2022-02-02 PROCEDURE — 3078F PR MOST RECENT DIASTOLIC BLOOD PRESSURE < 80 MM HG: ICD-10-PCS | Mod: CPTII,S$GLB,, | Performed by: FAMILY MEDICINE

## 2022-02-02 PROCEDURE — 99999 PR PBB SHADOW E&M-EST. PATIENT-LVL IV: CPT | Mod: PBBFAC,,, | Performed by: FAMILY MEDICINE

## 2022-02-02 RX ORDER — LEVOCETIRIZINE DIHYDROCHLORIDE 5 MG/1
5 TABLET, FILM COATED ORAL NIGHTLY
Qty: 30 TABLET | Refills: 11 | Status: SHIPPED | OUTPATIENT
Start: 2022-02-02 | End: 2022-10-21 | Stop reason: SDUPTHER

## 2022-02-02 RX ORDER — TRIAMCINOLONE ACETONIDE 1 MG/G
OINTMENT TOPICAL 2 TIMES DAILY
Qty: 80 G | Refills: 3 | Status: SHIPPED | OUTPATIENT
Start: 2022-02-02

## 2022-02-02 RX ORDER — ALBUTEROL SULFATE 90 UG/1
2 AEROSOL, METERED RESPIRATORY (INHALATION) EVERY 6 HOURS PRN
Qty: 8.5 G | Refills: 11 | Status: SHIPPED | OUTPATIENT
Start: 2022-02-02 | End: 2022-10-21 | Stop reason: SDUPTHER

## 2022-02-02 NOTE — PATIENT INSTRUCTIONS
USE MILD SOAP LIKE DOVE ON THE BODY AND CERAVE FACIAL CLEANSER    APPLY TOPICAL TRIAMCINOLONE OINTMENT TWICE DAILY TO ITCHY IRRITATED SKIN PATCHES EXCEPT DON'T USE THIS ON THE FACE.     ABOVE THE EYELIDS APPLY VASELINE AFTER CLEANSING

## 2022-02-02 NOTE — PROGRESS NOTES
Office Visit    Patient Name: Daya Limon    : 1980  MRN: [id number]    Subjective:  Daya is a 41 y.o. female who presents today for:    Allergies (Rash on left hand, neck, and face. Very itchy. )    Daya Limon is a 41 y.o.  who is status post C/S  to Non-reassuring fetal status on 21. She is breastfeeding/ pumping.    Had incisional discomfort and odor s/p antibiotics treatment with improvement-- s/p augmentin RX 21.  Still with some incisional pain that is bothered with certain movements and cough.        Most recently seen by me 2021 and advised on adding nightly Pepcid to daily Protonix and use of Astelin nasal spray.  Her cough did most resolve in December but for the last 2 weeks she is back to coughing again and periodically using her albuterol.   Using albuterol about twice weekly and this helps control coughing fits.     In addition to her recurrent cough that is responsive to albuterol (request refill) she wishes to discuss red, dry/itchy patches at multiple sites, but most prominent on her left hand.    She has a history of allergic rhinitis and sensitive skin, and lately due to being busy with a  has not attended to her normal skin care regimen.  She is not applying moisturizer.  She is noticing increased dryness and over the last couple of weeks has started noticing patches of red dry itchy skin.  Her skin seems to be very reactive-when she touches it it will get sensitive and irritated.  She is using an  soap that is not a mild soap.    Has been having some increased allergy symptoms associated with the skin rash and had to resume Xyzal as she was unable to take the Astelin nasal spray consistently-does potentially note a decreased breast milk supply is a side effect of Xyzal.             She has been seen by me for wheezing episodes in the past thought to be related to a combination of reflux and seasonal allergies.   Previously had a chest x-ray as part  "of evaluation for wheezing 04/27/2020 which was unremarkable.  Had pulmonary function tests 08/10/2020 which were negative for obstructive lung disease, with no suggestion of restrictive lung disease, but with suboptimal test performance.   She has seen an allergist and underwent allergy testing 4/6/21. Per message on results per Dr Blackmon 4/13/21: "Your allergy test only shows a low level allergy to dust mites."  She was advised on dust mite home interventions and also advised on trial of Pepcid at night in case her wheezing episodes were reflux related.  She has not undergone laryngoscopy or EGD to evaluate for reflux.               PAST MEDICAL HISTORY, SURGICAL/SOCIAL/FAMILY HISTORY REVIEWED AS PER CHART, WITH PERTINENT FINDINGS INCLUDED IN HISTORY SECTION OF NOTE.     Current Medications    Medication List with Changes/Refills   New Medications    LEVOCETIRIZINE (XYZAL) 5 MG TABLET    Take 1 tablet (5 mg total) by mouth every evening.    TRIAMCINOLONE ACETONIDE 0.1% (KENALOG) 0.1 % OINTMENT    Apply topically 2 (two) times daily.   Current Medications    AZELASTINE (ASTELIN) 137 MCG (0.1 %) NASAL SPRAY    1 spray (137 mcg total) by Nasal route 2 (two) times daily.    CHOLECALCIFEROL, VITAMIN D3, 125 MCG (5,000 UNIT) TAB    Take 1 tablet (5,000 Units total) by mouth daily with breakfast.    DEXTROMETHORPHAN HBR 2.5 MG LOZG    Use ase directed for cough    DOCUSATE SODIUM (COLACE) 100 MG CAPSULE    Take 1 capsule (100 mg total) by mouth 2 (two) times daily as needed for Constipation.    FAMOTIDINE (PEPCID) 40 MG TABLET    Take 1 tablet (40 mg total) by mouth every evening. Take in addition to Protonix in the morning for management of acid reflux    IBUPROFEN (ADVIL,MOTRIN) 600 MG TABLET    Take 1 tablet (600 mg total) by mouth every 6 (six) hours as needed for Pain.    CELESTINE ROMO OINTMENT    Apply topically 3 (three) times daily. Apply after feeding. Do not wash off. This compounded medication expires in 30 " "days.    PANTOPRAZOLE (PROTONIX) 40 MG TABLET    Take 1 tablet (40 mg total) by mouth 2 (two) times daily before meals.    PRENATAL VIT 28-IRON FUM-FOLIC 27 MG IRON- 1 MG TAB    Take 1 tablet by mouth daily with breakfast.    WESTAB PLUS 27 MG IRON- 1 MG TAB    Take 1 tablet by mouth once daily.   Changed and/or Refilled Medications    Modified Medication Previous Medication    ALBUTEROL (VENTOLIN HFA) 90 MCG/ACTUATION INHALER albuterol (VENTOLIN HFA) 90 mcg/actuation inhaler       Inhale 2 puffs into the lungs every 6 (six) hours as needed for Wheezing. Rescue    Inhale 2 puffs into the lungs every 6 (six) hours as needed for Wheezing. Rescue       Allergies   Review of patient's allergies indicates:  No Known Allergies      Review of Systems (Pertinent positives)  Review of Systems   Respiratory: Positive for cough and wheezing.    Skin: Positive for color change and rash.   Allergic/Immunologic: Positive for environmental allergies.       BP 96/61 (BP Location: Left arm, Patient Position: Sitting)   Pulse 78   Ht 5' 1" (1.549 m)   Wt 67.5 kg (148 lb 13 oz)   SpO2 98%   BMI 28.12 kg/m²     Physical Exam  Vitals reviewed.   Constitutional:       General: She is not in acute distress.     Appearance: Normal appearance. She is well-developed.   HENT:      Head: Normocephalic and atraumatic.   Eyes:      Conjunctiva/sclera: Conjunctivae normal.   Cardiovascular:      Rate and Rhythm: Normal rate and regular rhythm.   Pulmonary:      Effort: Pulmonary effort is normal.      Breath sounds: Normal breath sounds.   Musculoskeletal:      Right lower leg: No edema.      Left lower leg: No edema.   Skin:     General: Skin is warm and dry.      Findings: Rash (Eczematous rash consisting of patches of red dry irritated skin.) present.          Neurological:      General: No focal deficit present.      Mental Status: She is alert and oriented to person, place, and time.   Psychiatric:         Mood and Affect: Mood normal.  "        Behavior: Behavior normal.           Assessment/Plan:  Daya Limon is a 41 y.o. female who presents today for :        ICD-10-CM ICD-9-CM    1. Eczema of left hand  L30.9 692.9 triamcinolone acetonide 0.1% (KENALOG) 0.1 % ointment   2. Atopic dermatitis, unspecified type  L20.9 691.8 triamcinolone acetonide 0.1% (KENALOG) 0.1 % ointment      levocetirizine (XYZAL) 5 MG tablet   3. Atopic dermatitis of eyelid, unspecified laterality  H01.139 373.31    4. Gastroesophageal reflux disease, unspecified whether esophagitis present  K21.9 530.81    5. Vitamin D deficiency  E55.9 268.9    6. Nocturnal cough with wheeze  R05.8 786.2 albuterol (VENTOLIN HFA) 90 mcg/actuation inhaler    R06.2 786.07    7. Cough due to bronchospasm  J98.01 519.11 albuterol (VENTOLIN HFA) 90 mcg/actuation inhaler   8. Wheezing  R06.2 786.07 albuterol (VENTOLIN HFA) 90 mcg/actuation inhaler     41-year-old female patient of mine with history of allergies and sensitive skin now with rash consistent with atopic dermatitis.    Discussed the importance of use of a mild soap and daily moisturizer as a daily skin care regimen to decrease sensitivity of the skin.    Has resumed antihistamine and have prescribed steroid ointment to apply on irritated eczema patches.    Advised to avoid applying the steroid ointment on the face, can use Vaseline to the affected irritated areas of the eyelids after gently cleansing with a mild cleanser such as CeraVe.    Has a recurrent bronchospastic type cough that is albuterol responsive.  Discussed the importance of managing underlying cough risk factors including her reflux and allergies-she remains on a PPI and has resumed daily Xyzal.    Patient Instructions   USE MILD SOAP LIKE DOVE ON THE BODY AND CERAVE FACIAL CLEANSER    APPLY TOPICAL TRIAMCINOLONE OINTMENT TWICE DAILY TO ITCHY IRRITATED SKIN PATCHES EXCEPT DON'T USE THIS ON THE FACE.     ABOVE THE EYELIDS APPLY VASELINE AFTER CLEANSING            Follow  up for return as needed for new concerns.

## 2022-02-24 ENCOUNTER — PATIENT MESSAGE (OUTPATIENT)
Dept: FAMILY MEDICINE | Facility: CLINIC | Age: 42
End: 2022-02-24
Payer: COMMERCIAL

## 2022-03-14 ENCOUNTER — OFFICE VISIT (OUTPATIENT)
Dept: FAMILY MEDICINE | Facility: CLINIC | Age: 42
End: 2022-03-14
Payer: COMMERCIAL

## 2022-03-14 VITALS
OXYGEN SATURATION: 100 % | SYSTOLIC BLOOD PRESSURE: 100 MMHG | WEIGHT: 150.56 LBS | DIASTOLIC BLOOD PRESSURE: 82 MMHG | HEART RATE: 68 BPM | BODY MASS INDEX: 28.42 KG/M2 | HEIGHT: 61 IN

## 2022-03-14 DIAGNOSIS — M54.50 CHRONIC BILATERAL LOW BACK PAIN WITHOUT SCIATICA: ICD-10-CM

## 2022-03-14 DIAGNOSIS — Z98.891 S/P PRIMARY LOW TRANSVERSE C-SECTION: ICD-10-CM

## 2022-03-14 DIAGNOSIS — H01.009 SEBORRHEIC BLEPHARITIS, UNSPECIFIED LATERALITY: ICD-10-CM

## 2022-03-14 DIAGNOSIS — G89.29 CHRONIC BILATERAL LOW BACK PAIN WITHOUT SCIATICA: ICD-10-CM

## 2022-03-14 DIAGNOSIS — G89.29 CHRONIC LEFT-SIDED THORACIC BACK PAIN: ICD-10-CM

## 2022-03-14 DIAGNOSIS — M54.6 CHRONIC LEFT-SIDED THORACIC BACK PAIN: ICD-10-CM

## 2022-03-14 DIAGNOSIS — R29.3 POSTURAL IMBALANCE: ICD-10-CM

## 2022-03-14 DIAGNOSIS — L21.9 SEBORRHEIC DERMATITIS: Primary | ICD-10-CM

## 2022-03-14 PROBLEM — O09.529 HIGH-RISK PREGNANCY, MULTIGRAVIDA OF ADVANCED MATERNAL AGE, ANTEPARTUM: Status: RESOLVED | Noted: 2021-08-24 | Resolved: 2022-03-14

## 2022-03-14 PROCEDURE — 99999 PR PBB SHADOW E&M-EST. PATIENT-LVL V: ICD-10-PCS | Mod: PBBFAC,,, | Performed by: FAMILY MEDICINE

## 2022-03-14 PROCEDURE — 3079F DIAST BP 80-89 MM HG: CPT | Mod: CPTII,S$GLB,, | Performed by: FAMILY MEDICINE

## 2022-03-14 PROCEDURE — 1160F PR REVIEW ALL MEDS BY PRESCRIBER/CLIN PHARMACIST DOCUMENTED: ICD-10-PCS | Mod: CPTII,S$GLB,, | Performed by: FAMILY MEDICINE

## 2022-03-14 PROCEDURE — 3079F PR MOST RECENT DIASTOLIC BLOOD PRESSURE 80-89 MM HG: ICD-10-PCS | Mod: CPTII,S$GLB,, | Performed by: FAMILY MEDICINE

## 2022-03-14 PROCEDURE — 1159F MED LIST DOCD IN RCRD: CPT | Mod: CPTII,S$GLB,, | Performed by: FAMILY MEDICINE

## 2022-03-14 PROCEDURE — 99999 PR PBB SHADOW E&M-EST. PATIENT-LVL V: CPT | Mod: PBBFAC,,, | Performed by: FAMILY MEDICINE

## 2022-03-14 PROCEDURE — 1159F PR MEDICATION LIST DOCUMENTED IN MEDICAL RECORD: ICD-10-PCS | Mod: CPTII,S$GLB,, | Performed by: FAMILY MEDICINE

## 2022-03-14 PROCEDURE — 99214 PR OFFICE/OUTPT VISIT, EST, LEVL IV, 30-39 MIN: ICD-10-PCS | Mod: S$GLB,,, | Performed by: FAMILY MEDICINE

## 2022-03-14 PROCEDURE — 3008F BODY MASS INDEX DOCD: CPT | Mod: CPTII,S$GLB,, | Performed by: FAMILY MEDICINE

## 2022-03-14 PROCEDURE — 3008F PR BODY MASS INDEX (BMI) DOCUMENTED: ICD-10-PCS | Mod: CPTII,S$GLB,, | Performed by: FAMILY MEDICINE

## 2022-03-14 PROCEDURE — 3074F SYST BP LT 130 MM HG: CPT | Mod: CPTII,S$GLB,, | Performed by: FAMILY MEDICINE

## 2022-03-14 PROCEDURE — 1160F RVW MEDS BY RX/DR IN RCRD: CPT | Mod: CPTII,S$GLB,, | Performed by: FAMILY MEDICINE

## 2022-03-14 PROCEDURE — 3074F PR MOST RECENT SYSTOLIC BLOOD PRESSURE < 130 MM HG: ICD-10-PCS | Mod: CPTII,S$GLB,, | Performed by: FAMILY MEDICINE

## 2022-03-14 PROCEDURE — 99214 OFFICE O/P EST MOD 30 MIN: CPT | Mod: S$GLB,,, | Performed by: FAMILY MEDICINE

## 2022-03-14 RX ORDER — IBUPROFEN 600 MG/1
600 TABLET ORAL EVERY 6 HOURS PRN
Qty: 30 TABLET | Refills: 3 | Status: SHIPPED | OUTPATIENT
Start: 2022-03-14 | End: 2023-10-27 | Stop reason: ALTCHOICE

## 2022-03-14 RX ORDER — KETOCONAZOLE 20 MG/ML
SHAMPOO, SUSPENSION TOPICAL
Qty: 120 ML | Refills: 11 | Status: SHIPPED | OUTPATIENT
Start: 2022-03-14 | End: 2023-10-14 | Stop reason: SDUPTHER

## 2022-03-14 NOTE — PATIENT INSTRUCTIONS
TRY NIZORAL SHAMPOO FOR FOR SCALP AND EYELID IRRITATION AND SCALING    TRY PHYSICAL THERAPY AND IBUPROFEN AND ROLL ON BIO FREEZE AS NEEDED FOR BACK PAIN

## 2022-03-14 NOTE — PROGRESS NOTES
Office Visit    Patient Name: Daya Limon    : 1980  MRN: [id number]    Subjective:  Daya is a 42 y.o. female who presents today for:    Back Pain and Rash     42-year-old female patient of mine with history of allergies and sensitive skin  recently seen by me 22 for rash consistent with atopic dermatitis of her hands and some scaling of her eyelids.     She has been using Dove moisturizing soap daily, resume daily Xyzal antihistamine, and is using survey moisturizer.  She is applying topical triamcinolone  steroid cream to her hands as needed and reports significant improvements in the eczematous rash of her hands.    She is continuing to have some itching and scaling along with some darkening of her eyelids, particularly the upper eyelid.  Notes some associated scaling and redness of her scalp and sideburn areas of her face.    She has tried applying Vaseline after using a mild face wash/ eyelid scrub  with only limited results.      In addition to her skin issues she complains today of back pain.      She has had longstanding chronic back pain in large part related to her responsibilities as a mom of several children, deconditioning, postural imbalance, and recently course strength weakness related to recent .    Today she complains of left paraspinal thoracic pain, primarily just inside her shoulder blade.  Worse with certain movements.  She generally carries her baby on that side and then uses her right hand to do tasks in the meantime.    Has low back and pelvic pain but no sciatic pain.  Notes groin pain when extending her legs such as to get out of a car.    Has previously done physical therapy with benefit but not recently.       PAST MEDICAL HISTORY, SURGICAL/SOCIAL/FAMILY HISTORY REVIEWED AS PER CHART, WITH PERTINENT FINDINGS INCLUDED IN HISTORY SECTION OF NOTE.     Current Medications    Medication List with Changes/Refills   New Medications    KETOCONAZOLE (NIZORAL) 2 % SHAMPOO     Apply topically twice a week.   Current Medications    ALBUTEROL (VENTOLIN HFA) 90 MCG/ACTUATION INHALER    Inhale 2 puffs into the lungs every 6 (six) hours as needed for Wheezing. Rescue    AZELASTINE (ASTELIN) 137 MCG (0.1 %) NASAL SPRAY    1 spray (137 mcg total) by Nasal route 2 (two) times daily.    CHOLECALCIFEROL, VITAMIN D3, 125 MCG (5,000 UNIT) TAB    Take 1 tablet (5,000 Units total) by mouth daily with breakfast.    DEXTROMETHORPHAN HBR 2.5 MG LOZG    Use ase directed for cough    DOCUSATE SODIUM (COLACE) 100 MG CAPSULE    Take 1 capsule (100 mg total) by mouth 2 (two) times daily as needed for Constipation.    FAMOTIDINE (PEPCID) 40 MG TABLET    Take 1 tablet (40 mg total) by mouth every evening. Take in addition to Protonix in the morning for management of acid reflux    JACK ROMO OINTMENT    Apply topically 3 (three) times daily. Apply after feeding. Do not wash off. This compounded medication expires in 30 days.    LEVOCETIRIZINE (XYZAL) 5 MG TABLET    Take 1 tablet (5 mg total) by mouth every evening.    PANTOPRAZOLE (PROTONIX) 40 MG TABLET    Take 1 tablet (40 mg total) by mouth 2 (two) times daily before meals.    PRENATAL VIT 28-IRON FUM-FOLIC 27 MG IRON- 1 MG TAB    Take 1 tablet by mouth daily with breakfast.    TRIAMCINOLONE ACETONIDE 0.1% (KENALOG) 0.1 % OINTMENT    Apply topically 2 (two) times daily.    WESTAB PLUS 27 MG IRON- 1 MG TAB    Take 1 tablet by mouth once daily.   Changed and/or Refilled Medications    Modified Medication Previous Medication    IBUPROFEN (ADVIL,MOTRIN) 600 MG TABLET ibuprofen (ADVIL,MOTRIN) 600 MG tablet       Take 1 tablet (600 mg total) by mouth every 6 (six) hours as needed for Pain.    Take 1 tablet (600 mg total) by mouth every 6 (six) hours as needed for Pain.       Allergies   Review of patient's allergies indicates:  No Known Allergies      Review of Systems (Pertinent positives)  Review of Systems   Musculoskeletal: Positive for back pain.   Skin:  "Positive for rash.       /82 (BP Location: Left arm, Patient Position: Sitting)   Pulse 68   Ht 5' 1" (1.549 m)   Wt 68.3 kg (150 lb 9.2 oz)   SpO2 100%   BMI 28.45 kg/m²     Physical Exam  Vitals reviewed.   Constitutional:       General: She is not in acute distress.     Appearance: Normal appearance. She is well-developed.   HENT:      Head: Normocephalic and atraumatic.   Eyes:      Conjunctiva/sclera: Conjunctivae normal.      Right eye: Right conjunctiva is not injected. No exudate.     Left eye: Left conjunctiva is not injected. No exudate.    Cardiovascular:      Rate and Rhythm: Normal rate and regular rhythm.   Pulmonary:      Effort: Pulmonary effort is normal.      Breath sounds: Normal breath sounds.   Musculoskeletal:      Thoracic back: Spasms and tenderness present. Decreased range of motion.      Lumbar back: Spasms and tenderness present. Decreased range of motion.      Right lower leg: No edema.      Left lower leg: No edema.   Skin:     General: Skin is warm and dry.   Neurological:      General: No focal deficit present.      Mental Status: She is alert and oriented to person, place, and time.   Psychiatric:         Mood and Affect: Mood normal.         Behavior: Behavior normal.           Assessment/Plan:  Daya Limon is a 42 y.o. female who presents today for :        ICD-10-CM ICD-9-CM    1. Seborrheic dermatitis  L21.9 690.10 ketoconazole (NIZORAL) 2 % shampoo   2. Seborrheic blepharitis, unspecified laterality  H01.009 373.00 ketoconazole (NIZORAL) 2 % shampoo   3. Chronic left-sided thoracic back pain  M54.6 724.1 Ambulatory referral/consult to Physical/Occupational Therapy    G89.29 338.29 ibuprofen (ADVIL,MOTRIN) 600 MG tablet   4. Postural imbalance  R29.3 729.90 Ambulatory referral/consult to Physical/Occupational Therapy   5. Chronic bilateral low back pain without sciatica  M54.50 724.2 Ambulatory referral/consult to Physical/Occupational Therapy    G89.29 338.29 ibuprofen " (ADVIL,MOTRIN) 600 MG tablet   6. S/P primary low transverse   Z98.891 V45.89      Hand eczema improving with regular moisturizing routine and topical triamcinolone p.r.n..    Has symptoms of seborrheic dermatitis of the scalp and upper eyelids/ sideburn areas of the face.    Advised trial of Nizoral shampoo applied to the scalp and affected areas of the face twice weekly in-leave on for about 5 minutes and then rinse.  Advised to use extreme caution using the Nizoral in the eyelid area.    Can still apply Vaseline as needed for scaling.    If the scalp an eyelid irritation do not respond to this will advise dermatology referral.    For her back pain have advised physical therapy referral to focus on core strengthening, especially given her recent , postural re-education and myofascial release along with exercise routine continue improved function/decreased pain.  May take 600 mg ibuprofen p.r.n. since her GERD is better controlled.    Patient Instructions   TRY NIZORAL SHAMPOO FOR FOR SCALP AND EYELID IRRITATION AND SCALING    TRY PHYSICAL THERAPY AND IBUPROFEN AND ROLL ON BIO FREEZE AS NEEDED FOR BACK PAIN        Follow up for return as needed for new concerns.

## 2022-03-15 ENCOUNTER — PATIENT OUTREACH (OUTPATIENT)
Dept: ADMINISTRATIVE | Facility: OTHER | Age: 42
End: 2022-03-15
Payer: COMMERCIAL

## 2022-03-15 ENCOUNTER — OFFICE VISIT (OUTPATIENT)
Dept: OBSTETRICS AND GYNECOLOGY | Facility: CLINIC | Age: 42
End: 2022-03-15
Attending: OBSTETRICS & GYNECOLOGY
Payer: COMMERCIAL

## 2022-03-15 VITALS
HEIGHT: 61 IN | SYSTOLIC BLOOD PRESSURE: 102 MMHG | BODY MASS INDEX: 28.39 KG/M2 | DIASTOLIC BLOOD PRESSURE: 58 MMHG | WEIGHT: 150.38 LBS

## 2022-03-15 DIAGNOSIS — Z12.39 SCREENING BREAST EXAMINATION: ICD-10-CM

## 2022-03-15 DIAGNOSIS — Z12.4 ROUTINE CERVICAL SMEAR: ICD-10-CM

## 2022-03-15 DIAGNOSIS — Z01.419 WELL WOMAN EXAM WITH ROUTINE GYNECOLOGICAL EXAM: Primary | ICD-10-CM

## 2022-03-15 PROCEDURE — 99999 PR PBB SHADOW E&M-EST. PATIENT-LVL III: ICD-10-PCS | Mod: PBBFAC,,, | Performed by: OBSTETRICS & GYNECOLOGY

## 2022-03-15 PROCEDURE — 87624 HPV HI-RISK TYP POOLED RSLT: CPT | Performed by: OBSTETRICS & GYNECOLOGY

## 2022-03-15 PROCEDURE — 3078F PR MOST RECENT DIASTOLIC BLOOD PRESSURE < 80 MM HG: ICD-10-PCS | Mod: CPTII,S$GLB,, | Performed by: OBSTETRICS & GYNECOLOGY

## 2022-03-15 PROCEDURE — 99396 PREV VISIT EST AGE 40-64: CPT | Mod: S$GLB,,, | Performed by: OBSTETRICS & GYNECOLOGY

## 2022-03-15 PROCEDURE — 3078F DIAST BP <80 MM HG: CPT | Mod: CPTII,S$GLB,, | Performed by: OBSTETRICS & GYNECOLOGY

## 2022-03-15 PROCEDURE — 3008F BODY MASS INDEX DOCD: CPT | Mod: CPTII,S$GLB,, | Performed by: OBSTETRICS & GYNECOLOGY

## 2022-03-15 PROCEDURE — 3008F PR BODY MASS INDEX (BMI) DOCUMENTED: ICD-10-PCS | Mod: CPTII,S$GLB,, | Performed by: OBSTETRICS & GYNECOLOGY

## 2022-03-15 PROCEDURE — 1159F MED LIST DOCD IN RCRD: CPT | Mod: CPTII,S$GLB,, | Performed by: OBSTETRICS & GYNECOLOGY

## 2022-03-15 PROCEDURE — 99999 PR PBB SHADOW E&M-EST. PATIENT-LVL III: CPT | Mod: PBBFAC,,, | Performed by: OBSTETRICS & GYNECOLOGY

## 2022-03-15 PROCEDURE — 99396 PR PREVENTIVE VISIT,EST,40-64: ICD-10-PCS | Mod: S$GLB,,, | Performed by: OBSTETRICS & GYNECOLOGY

## 2022-03-15 PROCEDURE — 3074F PR MOST RECENT SYSTOLIC BLOOD PRESSURE < 130 MM HG: ICD-10-PCS | Mod: CPTII,S$GLB,, | Performed by: OBSTETRICS & GYNECOLOGY

## 2022-03-15 PROCEDURE — 3074F SYST BP LT 130 MM HG: CPT | Mod: CPTII,S$GLB,, | Performed by: OBSTETRICS & GYNECOLOGY

## 2022-03-15 PROCEDURE — 88175 CYTOPATH C/V AUTO FLUID REDO: CPT | Performed by: OBSTETRICS & GYNECOLOGY

## 2022-03-15 PROCEDURE — 1159F PR MEDICATION LIST DOCUMENTED IN MEDICAL RECORD: ICD-10-PCS | Mod: CPTII,S$GLB,, | Performed by: OBSTETRICS & GYNECOLOGY

## 2022-03-15 NOTE — PROGRESS NOTES
Care Everywhere:   Immunization:  Health Maintenance: updated  Media Review: review for outside mammogram report   Legacy Review:   DIS:no profile in portal   Order placed:   Upcoming appts:  EFAX:  Task Tickets:Mammogram scheduling ticket sent to patient's portal on 1.10.2022  Referrals:

## 2022-03-15 NOTE — PROGRESS NOTES
CC: Well woman exam    Daya Limon is a 42 y.o. female  presents for well woman exam.  LMP: No LMP recorded..  No issues, problems, or complaints.  Reports sometimes a pulling sensation at the lateral aspects of her incision from , no actual pain. Currently breastfeeding, no menses.  Condoms for contraception.      Past Medical History:   Diagnosis Date    Body mass index (BMI) 24.0-24.9, adult 2016    GERD (gastroesophageal reflux disease) 2017    Meibomian gland dysfunction (MGD)     S/P primary low transverse  2021    Thyroid nodule     Vitamin D deficiency 2017     Past Surgical History:   Procedure Laterality Date     SECTION N/A 2021    Procedure:  SECTION;  Surgeon: Lashanda Liu DO;  [facility];  Service: OB/GYN;  Laterality: N/A;    VAGINAL DELIVERY      X2     Social History     Socioeconomic History    Marital status:     Number of children: 2   Tobacco Use    Smoking status: Never Smoker    Smokeless tobacco: Never Used   Substance and Sexual Activity    Alcohol use: No    Drug use: No    Sexual activity: Yes     Partners: Male     Birth control/protection: None     Social Determinants of Health     Financial Resource Strain: Low Risk     Difficulty of Paying Living Expenses: Not hard at all   Food Insecurity: No Food Insecurity    Worried About Running Out of Food in the Last Year: Never true    Ran Out of Food in the Last Year: Never true   Transportation Needs: No Transportation Needs    Lack of Transportation (Medical): No    Lack of Transportation (Non-Medical): No   Physical Activity: Unknown    Days of Exercise per Week: Patient refused   Stress: No Stress Concern Present    Feeling of Stress : Not at all   Social Connections: Unknown    Frequency of Communication with Friends and Family: More than three times a week    Frequency of Social Gatherings with Friends and Family: Once a week  "   Active Member of Clubs or Organizations: No    Attends Club or Organization Meetings: Never    Marital Status:      Family History   Problem Relation Age of Onset    Diabetes Mother     Diabetes Father     Kidney disease Father     Kidney failure Father     Cataracts Father     No Known Problems Sister     No Known Problems Brother     No Known Problems Maternal Aunt     No Known Problems Maternal Uncle     No Known Problems Paternal Aunt     No Known Problems Paternal Uncle     No Known Problems Maternal Grandmother     No Known Problems Maternal Grandfather     No Known Problems Paternal Grandmother     No Known Problems Paternal Grandfather     Breast cancer Neg Hx     Colon cancer Neg Hx     Ovarian cancer Neg Hx     Hypertension Neg Hx     Stroke Neg Hx     Amblyopia Neg Hx     Blindness Neg Hx     Cancer Neg Hx     Glaucoma Neg Hx     Macular degeneration Neg Hx     Retinal detachment Neg Hx     Strabismus Neg Hx     Thyroid disease Neg Hx     Thyroid nodules Neg Hx     Thyroid cancer Neg Hx      OB History        5    Para   3    Term   3            AB        Living   3       SAB        IAB        Ectopic        Multiple   0    Live Births   3                 BP (!) 102/58   Ht 5' 1" (1.549 m)   Wt 68.2 kg (150 lb 5.7 oz)   Breastfeeding Yes   BMI 28.41 kg/m²       ROS:  GENERAL: Denies weight gain or weight loss. Feeling well overall.   SKIN: Denies rash or lesions.   HEAD: Denies head injury or headache.   NODES: Denies enlarged lymph nodes.   CHEST: Denies chest pain or shortness of breath.   CARDIOVASCULAR: Denies palpitations or left sided chest pain.   ABDOMEN: No abdominal pain, constipation, diarrhea, nausea, vomiting or rectal bleeding.   URINARY: No frequency, dysuria, hematuria, or burning on urination.  REPRODUCTIVE: See HPI.   BREASTS: The patient performs breast self-examination and denies pain, lumps, or nipple discharge. "   HEMATOLOGIC: No easy bruisability or excessive bleeding.   MUSCULOSKELETAL: Denies joint pain or swelling.   NEUROLOGIC: Denies syncope or weakness.   PSYCHIATRIC: Denies depression, anxiety or mood swings.    PHYSICAL EXAM:  APPEARANCE: Well nourished, well developed, in no acute distress.  AFFECT: WNL, alert and oriented x 3  SKIN: No acne or hirsutism  NECK: Neck symmetric without masses or thyromegaly  NODES: No inguinal, cervical, axillary, or femoral lymph node enlargement  CHEST: Good respiratory effect  ABDOMEN: Soft.  No tenderness or masses.  No hepatosplenomegaly.  No hernias.  BREASTS: Symmetrical, no skin changes or visible lesions.  No palpable masses, nipple discharge bilaterally.  PELVIC: Normal external genitalia without lesions.  Normal hair distribution.  Adequate perineal body, normal urethral meatus.  Vagina moist and well rugated without lesions or discharge.  Cervix pink, without lesions, discharge or tenderness.  No significant cystocele or rectocele.  Bimanual exam shows uterus to be normal size, regular, mobile and nontender.  Adnexa without masses or tenderness.    EXTREMITIES: No edema.    Well woman exam with routine gynecological exam  -     Liquid-Based Pap Smear, Screening  -     HPV High Risk Genotypes, PCR    Screening breast examination  -     Mammo Digital Diagnostic Right with Asif; Future; Expected date: 03/15/2022    mammo when finished with breastfeeding        Patient was counseled today on A.C.S. Pap guidelines and recommendations for yearly pelvic exams, mammograms and monthly self breast exams; to see her PCP for other health maintenance.     No follow-ups on file.

## 2022-03-21 LAB
FINAL PATHOLOGIC DIAGNOSIS: NORMAL
Lab: NORMAL

## 2022-03-22 LAB
HPV HR 12 DNA SPEC QL NAA+PROBE: NEGATIVE
HPV16 AG SPEC QL: NEGATIVE
HPV18 DNA SPEC QL NAA+PROBE: NEGATIVE

## 2022-04-04 ENCOUNTER — OFFICE VISIT (OUTPATIENT)
Dept: FAMILY MEDICINE | Facility: CLINIC | Age: 42
End: 2022-04-04
Payer: COMMERCIAL

## 2022-04-04 VITALS
BODY MASS INDEX: 28.35 KG/M2 | TEMPERATURE: 99 F | OXYGEN SATURATION: 97 % | WEIGHT: 150.13 LBS | HEIGHT: 61 IN | HEART RATE: 91 BPM | DIASTOLIC BLOOD PRESSURE: 76 MMHG | SYSTOLIC BLOOD PRESSURE: 113 MMHG

## 2022-04-04 DIAGNOSIS — J30.89 SEASONAL ALLERGIC RHINITIS DUE TO OTHER ALLERGIC TRIGGER: ICD-10-CM

## 2022-04-04 DIAGNOSIS — B34.9 VIRAL SYNDROME: ICD-10-CM

## 2022-04-04 DIAGNOSIS — B34.9 ACUTE BRONCHOSPASM DUE TO VIRAL INFECTION: ICD-10-CM

## 2022-04-04 DIAGNOSIS — J98.01 ACUTE BRONCHOSPASM DUE TO VIRAL INFECTION: ICD-10-CM

## 2022-04-04 DIAGNOSIS — R50.9 FEVER, UNSPECIFIED FEVER CAUSE: Primary | ICD-10-CM

## 2022-04-04 LAB
CTP QC/QA: YES
CTP QC/QA: YES
FLUAV AG NPH QL: NEGATIVE
FLUBV AG NPH QL: NEGATIVE
SARS-COV-2 RDRP RESP QL NAA+PROBE: NEGATIVE

## 2022-04-04 PROCEDURE — 99214 OFFICE O/P EST MOD 30 MIN: CPT | Mod: S$GLB,,, | Performed by: FAMILY MEDICINE

## 2022-04-04 PROCEDURE — 1160F RVW MEDS BY RX/DR IN RCRD: CPT | Mod: CPTII,S$GLB,, | Performed by: FAMILY MEDICINE

## 2022-04-04 PROCEDURE — 3074F PR MOST RECENT SYSTOLIC BLOOD PRESSURE < 130 MM HG: ICD-10-PCS | Mod: CPTII,S$GLB,, | Performed by: FAMILY MEDICINE

## 2022-04-04 PROCEDURE — 3008F BODY MASS INDEX DOCD: CPT | Mod: CPTII,S$GLB,, | Performed by: FAMILY MEDICINE

## 2022-04-04 PROCEDURE — 87804 POCT INFLUENZA A/B: ICD-10-PCS | Mod: QW,S$GLB,, | Performed by: FAMILY MEDICINE

## 2022-04-04 PROCEDURE — 99214 PR OFFICE/OUTPT VISIT, EST, LEVL IV, 30-39 MIN: ICD-10-PCS | Mod: S$GLB,,, | Performed by: FAMILY MEDICINE

## 2022-04-04 PROCEDURE — 3008F PR BODY MASS INDEX (BMI) DOCUMENTED: ICD-10-PCS | Mod: CPTII,S$GLB,, | Performed by: FAMILY MEDICINE

## 2022-04-04 PROCEDURE — 1159F PR MEDICATION LIST DOCUMENTED IN MEDICAL RECORD: ICD-10-PCS | Mod: CPTII,S$GLB,, | Performed by: FAMILY MEDICINE

## 2022-04-04 PROCEDURE — 1160F PR REVIEW ALL MEDS BY PRESCRIBER/CLIN PHARMACIST DOCUMENTED: ICD-10-PCS | Mod: CPTII,S$GLB,, | Performed by: FAMILY MEDICINE

## 2022-04-04 PROCEDURE — U0002: ICD-10-PCS | Mod: QW,S$GLB,, | Performed by: FAMILY MEDICINE

## 2022-04-04 PROCEDURE — 3078F DIAST BP <80 MM HG: CPT | Mod: CPTII,S$GLB,, | Performed by: FAMILY MEDICINE

## 2022-04-04 PROCEDURE — 87804 INFLUENZA ASSAY W/OPTIC: CPT | Mod: QW,S$GLB,, | Performed by: FAMILY MEDICINE

## 2022-04-04 PROCEDURE — 1159F MED LIST DOCD IN RCRD: CPT | Mod: CPTII,S$GLB,, | Performed by: FAMILY MEDICINE

## 2022-04-04 PROCEDURE — 99999 PR PBB SHADOW E&M-EST. PATIENT-LVL IV: ICD-10-PCS | Mod: PBBFAC,,, | Performed by: FAMILY MEDICINE

## 2022-04-04 PROCEDURE — 99999 PR PBB SHADOW E&M-EST. PATIENT-LVL IV: CPT | Mod: PBBFAC,,, | Performed by: FAMILY MEDICINE

## 2022-04-04 PROCEDURE — 3074F SYST BP LT 130 MM HG: CPT | Mod: CPTII,S$GLB,, | Performed by: FAMILY MEDICINE

## 2022-04-04 PROCEDURE — U0002 COVID-19 LAB TEST NON-CDC: HCPCS | Mod: QW,S$GLB,, | Performed by: FAMILY MEDICINE

## 2022-04-04 PROCEDURE — 3078F PR MOST RECENT DIASTOLIC BLOOD PRESSURE < 80 MM HG: ICD-10-PCS | Mod: CPTII,S$GLB,, | Performed by: FAMILY MEDICINE

## 2022-04-04 RX ORDER — FLUTICASONE PROPIONATE 50 MCG
2 SPRAY, SUSPENSION (ML) NASAL DAILY
Qty: 16 G | Refills: 11 | Status: SHIPPED | OUTPATIENT
Start: 2022-04-04

## 2022-04-04 RX ORDER — METHYLPREDNISOLONE 4 MG/1
TABLET ORAL
Qty: 21 EACH | Refills: 0 | Status: SHIPPED | OUTPATIENT
Start: 2022-04-04 | End: 2022-04-25

## 2022-04-04 RX ORDER — TRIAMCINOLONE ACETONIDE 40 MG/ML
80 INJECTION, SUSPENSION INTRA-ARTICULAR; INTRAMUSCULAR
Status: DISCONTINUED | OUTPATIENT
Start: 2022-04-04 | End: 2024-02-26

## 2022-04-04 NOTE — PROGRESS NOTES
Office Visit    Patient Name: Daya Limon    : 1980  MRN: [id number]    Subjective:  Daay is a 42 y.o. female who presents today for:    Fever, Nasal Congestion (Symptoms started about 2 days ago), and Allergies    Day before yesterday she started having a bad headache on the  day of -- did fast     Yesterday she started having fever and chills with a T Max 101.6. Taking tylenol every 4-6 hours.     Last dose of Tylenol was 530 this AM and no recurrent fever.     Can't breath through her nose and having sinus congestion and pressure.    She tried an albuterol inhaler she has due to feeling some heaviness in her chest.     Mild cough productive of some phlegm.     Inhaler did help with with chest heaviness for about 4 hours.     Her daughter and her niece have had some similar symptoms.     PAST MEDICAL HISTORY, SURGICAL/SOCIAL/FAMILY HISTORY REVIEWED AS PER CHART, WITH PERTINENT FINDINGS INCLUDED IN HISTORY SECTION OF NOTE.     Current Medications    Medication List with Changes/Refills   New Medications    FLUTICASONE PROPIONATE (FLONASE) 50 MCG/ACTUATION NASAL SPRAY    2 sprays (100 mcg total) by Each Nostril route once daily. Take daily as needed for nasal congestion    METHYLPREDNISOLONE (MEDROL DOSEPACK) 4 MG TABLET    use as directed   Current Medications    ALBUTEROL (VENTOLIN HFA) 90 MCG/ACTUATION INHALER    Inhale 2 puffs into the lungs every 6 (six) hours as needed for Wheezing. Rescue    AZELASTINE (ASTELIN) 137 MCG (0.1 %) NASAL SPRAY    1 spray (137 mcg total) by Nasal route 2 (two) times daily.    CHOLECALCIFEROL, VITAMIN D3, 125 MCG (5,000 UNIT) TAB    Take 1 tablet (5,000 Units total) by mouth daily with breakfast.    DEXTROMETHORPHAN HBR 2.5 MG LOZG    Use ase directed for cough    DOCUSATE SODIUM (COLACE) 100 MG CAPSULE    Take 1 capsule (100 mg total) by mouth 2 (two) times daily as needed for Constipation.    FAMOTIDINE (PEPCID) 40 MG TABLET    Take 1 tablet (40 mg total) by  "mouth every evening. Take in addition to Protonix in the morning for management of acid reflux    IBUPROFEN (ADVIL,MOTRIN) 600 MG TABLET    Take 1 tablet (600 mg total) by mouth every 6 (six) hours as needed for Pain.    JACK ROMO OINTMENT    Apply topically 3 (three) times daily. Apply after feeding. Do not wash off. This compounded medication expires in 30 days.    KETOCONAZOLE (NIZORAL) 2 % SHAMPOO    Apply topically twice a week.    LEVOCETIRIZINE (XYZAL) 5 MG TABLET    Take 1 tablet (5 mg total) by mouth every evening.    PANTOPRAZOLE (PROTONIX) 40 MG TABLET    Take 1 tablet (40 mg total) by mouth 2 (two) times daily before meals.    PRENATAL VIT 28-IRON FUM-FOLIC 27 MG IRON- 1 MG TAB    Take 1 tablet by mouth daily with breakfast.    TRIAMCINOLONE ACETONIDE 0.1% (KENALOG) 0.1 % OINTMENT    Apply topically 2 (two) times daily.    WESTAB PLUS 27 MG IRON- 1 MG TAB    Take 1 tablet by mouth once daily.       Allergies   Review of patient's allergies indicates:  No Known Allergies      Review of Systems (Pertinent positives)  Review of Systems   Constitutional: Positive for fatigue and fever.   HENT: Positive for congestion, postnasal drip and sinus pressure.    Respiratory: Positive for cough, chest tightness and wheezing.        /76 (BP Location: Right arm, Patient Position: Sitting)   Pulse 91   Temp 98.8 °F (37.1 °C) (Oral)   Ht 5' 1" (1.549 m)   Wt 68.1 kg (150 lb 2.1 oz)   SpO2 97%   BMI 28.37 kg/m²     Physical Exam  Vitals reviewed.   Constitutional:       General: She is not in acute distress.     Appearance: Normal appearance. She is well-developed.   HENT:      Head: Normocephalic and atraumatic.      Right Ear: Tympanic membrane normal.      Left Ear: Tympanic membrane normal.      Nose: Congestion present.      Mouth/Throat:      Mouth: No oral lesions.      Pharynx: Posterior oropharyngeal erythema (with cobblestoning/ clear pnd) present. No oropharyngeal exudate.   Eyes:      " Conjunctiva/sclera: Conjunctivae normal.   Cardiovascular:      Rate and Rhythm: Normal rate and regular rhythm.   Pulmonary:      Effort: Pulmonary effort is normal.      Breath sounds: Normal breath sounds. No wheezing (bronchospastic cough), rhonchi or rales.   Musculoskeletal:      Right lower leg: No edema.      Left lower leg: No edema.   Skin:     General: Skin is warm and dry.   Neurological:      General: No focal deficit present.      Mental Status: She is alert and oriented to person, place, and time.   Psychiatric:         Mood and Affect: Mood normal.         Behavior: Behavior normal.           Assessment/Plan:  Daya Limon is a 42 y.o. female who presents today for :        ICD-10-CM ICD-9-CM    1. Fever, unspecified fever cause  R50.9 780.60 POCT Influenza A/B      POCT COVID-19 Rapid Screening      methylPREDNISolone (MEDROL DOSEPACK) 4 mg tablet      triamcinolone acetonide injection 80 mg      fluticasone propionate (FLONASE) 50 mcg/actuation nasal spray   2. Viral syndrome  B34.9 079.99 POCT Influenza A/B      POCT COVID-19 Rapid Screening      methylPREDNISolone (MEDROL DOSEPACK) 4 mg tablet      triamcinolone acetonide injection 80 mg      fluticasone propionate (FLONASE) 50 mcg/actuation nasal spray   3. Acute bronchospasm due to viral infection  J98.01 519.11     B34.9 079.99    4. Seasonal allergic rhinitis due to other allergic trigger  J30.89 477.8 methylPREDNISolone (MEDROL DOSEPACK) 4 mg tablet      triamcinolone acetonide injection 80 mg      fluticasone propionate (FLONASE) 50 mcg/actuation nasal spray   5. Breast feeding status of mother  Z39.1 V24.1      42-year-old female with history of bronchospasm associated with allergies &/or  viral infection, who presents today with fever and URI/allergy symptoms over the last 48 hours.    She is already on nightly Xyzal and as she is breastfeeding would like to avoid agents that may further affect her breast milk supply as she does find that  the Xyzal decreases it some.    COVID, flu tests negative, suspect combination of allergic rhinitis, acute upper respiratory viral syndrome with bronchospasm..    Cough is responsive to albuterol inhaler, which she should continue.    80 mg Kenalog injection given today, to hopefully decrease allergic rhinitis symptom and sinus congestion, bronchospastic cough.    Advised Flonase nasal spray for congestion.    Could try small dose of Sudafed to see how it affects breast milk production is still having significant congestion.    Advise hydration, rest, continued supportive care and hopefully symptoms will start to improve within 48 hours of the steroid injection.    If not she will follow-up.       There are no Patient Instructions on file for this visit.      Follow up for return as needed for new concerns.

## 2022-04-19 ENCOUNTER — PATIENT OUTREACH (OUTPATIENT)
Dept: ADMINISTRATIVE | Facility: OTHER | Age: 42
End: 2022-04-19
Payer: COMMERCIAL

## 2022-04-20 NOTE — PROGRESS NOTES
Patient's chart was reviewed for overdue VELMA topics.  Health maintenance:updated  Immunizations:reconciled   Legacy:   Media:  Orders placed:  Tasked appts:  Labs Linked:  Upcoming appt:

## 2022-04-21 ENCOUNTER — OFFICE VISIT (OUTPATIENT)
Dept: OPTOMETRY | Facility: CLINIC | Age: 42
End: 2022-04-21
Payer: COMMERCIAL

## 2022-04-21 DIAGNOSIS — R51.9 HEADACHE ABOVE THE EYE REGION: Primary | ICD-10-CM

## 2022-04-21 DIAGNOSIS — H52.203 HYPEROPIC ASTIGMATISM, BILATERAL: ICD-10-CM

## 2022-04-21 PROCEDURE — 99999 PR PBB SHADOW E&M-EST. PATIENT-LVL III: CPT | Mod: PBBFAC,,, | Performed by: OPTOMETRIST

## 2022-04-21 PROCEDURE — 92015 PR REFRACTION: ICD-10-PCS | Mod: S$GLB,,, | Performed by: OPTOMETRIST

## 2022-04-21 PROCEDURE — 99999 PR PBB SHADOW E&M-EST. PATIENT-LVL III: ICD-10-PCS | Mod: PBBFAC,,, | Performed by: OPTOMETRIST

## 2022-04-21 PROCEDURE — 92015 DETERMINE REFRACTIVE STATE: CPT | Mod: S$GLB,,, | Performed by: OPTOMETRIST

## 2022-04-21 PROCEDURE — 92014 PR EYE EXAM, EST PATIENT,COMPREHESV: ICD-10-PCS | Mod: S$GLB,,, | Performed by: OPTOMETRIST

## 2022-04-21 PROCEDURE — 1159F PR MEDICATION LIST DOCUMENTED IN MEDICAL RECORD: ICD-10-PCS | Mod: CPTII,S$GLB,, | Performed by: OPTOMETRIST

## 2022-04-21 PROCEDURE — 1159F MED LIST DOCD IN RCRD: CPT | Mod: CPTII,S$GLB,, | Performed by: OPTOMETRIST

## 2022-04-21 PROCEDURE — 92014 COMPRE OPH EXAM EST PT 1/>: CPT | Mod: S$GLB,,, | Performed by: OPTOMETRIST

## 2022-04-21 NOTE — PROGRESS NOTES
HPI     Patient presents for a routine eye exam. Patient notes slight headache.   Patient notes difficulty seeing near.    Last edited by Farhad Villarreal on 4/21/2022  1:34 PM. (History)            Assessment /Plan     For exam results, see Encounter Report.    Headache above the eye region  -frontal AH consistent with updated Hyperopic Rx  -increased near power in sRx    Hyperopic astigmatism, bilateral  Eyeglass Final Rx     Eyeglass Final Rx       Sphere Cylinder Axis Dist [facility]    Right +0.75 +0.75 105 20/20    Left +1.00 +0.75 070 20/20    Type: near    Expiration Date: 4/21/2023                  RTC 1 yr

## 2022-05-16 ENCOUNTER — CLINICAL SUPPORT (OUTPATIENT)
Dept: REHABILITATION | Facility: HOSPITAL | Age: 42
End: 2022-05-16
Payer: COMMERCIAL

## 2022-05-16 DIAGNOSIS — R53.1 DECREASED STRENGTH: ICD-10-CM

## 2022-05-16 DIAGNOSIS — G89.29 CHRONIC LEFT-SIDED THORACIC BACK PAIN: Primary | ICD-10-CM

## 2022-05-16 DIAGNOSIS — M54.6 CHRONIC LEFT-SIDED THORACIC BACK PAIN: Primary | ICD-10-CM

## 2022-05-16 PROCEDURE — 97162 PT EVAL MOD COMPLEX 30 MIN: CPT | Mod: PN

## 2022-05-16 PROCEDURE — 97110 THERAPEUTIC EXERCISES: CPT | Mod: PN

## 2022-05-16 NOTE — PLAN OF CARE
[facility]   Physical Therapy Initial Evaluation     Date: 5/16/2022   Name: Daya Limon  Clinic Number: [id number]    Therapy Diagnosis:   Encounter Diagnoses   Name Primary?    Chronic left-sided thoracic back pain Yes    Decreased strength      Physician: Esthela Abdi MD    Physician Orders: PT Eval and Treat   Medical Diagnosis from Referral: : Chronic left-sided thoracic back pain [M54.6, G89.29], Postural imbalance [R29.3], Chronic bilateral low back pain without sciatica [M54.50, G89.29]  Evaluation Date: 5/16/2022  Authorization Period Expiration: 12/31/2022  Plan of Care Expiration: 7/1/2022  Progress Note Due: 6/16/2022  Visit # / Visits authorized: 1/50   FOTO: 1/5    Precautions: Standard    Time In: 1020  Time Out: 1100  Total Appointment Time (timed & untimed codes): 40 minutes    SUBJECTIVE     Date of onset: 5-6 months ago    History of current condition - Daya reports: chronic bilateral cervicoscapular and midline and left sided thoracic pain. Recent exacerbation began in cervicoscapular area soon after having her daughter (currently 6 months old). Patient thinks pain is related to holding and carrying her child. Patient reports pain progressed to thoracic area. Patient denies symptoms into either upper extremity or lower extremity. Patient reports history of therapy and postural exercises like pec stretch has really helped with pain.     Falls: None    Imaging: None pertaining to current pain    Prior Therapy: Prior to COVID-19 shut down. Patient reports the exercises were really helpful but lost track of them  Social History: Patient enjoys sitting and playing with daughters and cooking for them. Patient reports she enjoyed walking with them but is unable secondary to pain.   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 Patient has 4 daughters including a 6 month old daughter. Patient reports they all expect something different of her. Patient reports she has pain  while cooking.   Prior Level of Function: No pain  Current Level of Function: Pain with lifting and carrying child    Pain:  Current 6/10, worst 10/10, best 2/10   Location: Bateral cervicoscapular area and left thoracic area  Description: Fatigue  Aggravating Factors: Standing, walking, lifting and carrying  Easing Factors: Pain medication, massage     Patients goals: Eliminate pain. Return to walking (1 hour) and cooking (2+ hours) without issues.     Medical History:   Past Medical History:   Diagnosis Date    Body mass index (BMI) 24.0-24.9, adult 2016    GERD (gastroesophageal reflux disease) 2017    Meibomian gland dysfunction (MGD)     S/P primary low transverse  2021    Thyroid nodule     Vitamin D deficiency 2017     Surgical History:   Daya Limon  has a past surgical history that includes Vaginal delivery and  section (N/A, 2021).    Medications:   Daya has a current medication list which includes the following prescription(s): albuterol, azelastine, cholecalciferol (vitamin d3), dextromethorphan hbr, docusate sodium, famotidine, fluticasone propionate, ibuprofen, abena perry ointment, ketoconazole, levocetirizine, pantoprazole, prenatal vit 28-iron fum-folic, triamcinolone acetonide 0.1%, and westab plus, and the following Facility-Administered Medications: triamcinolone acetonide.    Allergies:   Review of patient's allergies indicates:  No Known Allergies     OBJECTIVE     Posture: Sitting: Functional slump. Bilateral scapular abduction and protraction  Palpation: Pain to bilateral upper trapezius, levator scapulae, right middle trapezius, and right thoracic erector spinae    Cervical Active range of motion  Pain/dysfunction with movement   Flexion 45 Pulling behind neck   Extension 40    Right side bending 38    Left side bending 40    Right rotation 70    Left rotation 60      Shoulder Right Left Pain/dysfunction with movement   Active range of  "motion/passive range of motion       Flexion  150/170  150/160    Extension within normal limits  within normal limits     Abduction  180/ not tested   160/175    External rotation, functional T3  T3    Internal rotation, functional  T12-L1 T10      Upper extremity manual muscle tests  Right Left Pain/dysfunction with movement   Shoulder flexion 4/5 4/5    Shoulder extension 4/5 4-/5    Shoulder abduction 4/5 4/5    Shoulder internal rotation at 90 degrees abduction 4+/5 4+/5    Shoulder external rotation at 90 degrees abduction   4+/5 4+/5    Elbow flexion  4+/5 4+/5    Elbow extension 4/5 4/5    Upper trapezius 4/5 4-/5    Middle trapezius 3+/5 3+/5    Lower trapezius 3+/5 3+/5      Joint mobility:  Thoracic: Hypomobile with grade II-III central posterior to anterior mobilizations    Limitation/Restriction for FOTO Neck Survey    Therapist reviewed FOTO scores for Daya Limon on 5/16/2022.   FOTO documents entered into EPIC - see Media section.    Limitation Score: 51%       TREATMENT     Total Treatment time (time-based codes) separate from Evaluation: 25 minutes      Daya received the treatments listed below:      therapeutic exercises to develop strength, endurance, flexibility and posture for 25 minutes including:    Supine chin tuck: Reviewed for home exercise program     Seated:  Upper trapezius stretch with shoulder functional internal rotation: 30" bilateral  Chin tuck: 2"x10    Standing:  Corner pec stretch: 30"  Rows: red theraband x10  Straight arm pull down: red theraband x10    PATIENT EDUCATION AND HOME EXERCISES     Education provided:   - home exercise program   - findings; prognosis and plan of care    Written Home Exercises Provided: yes. Exercises were reviewed and Daya was able to demonstrate them prior to the end of the session.  Daya demonstrated good  understanding of the education provided. See EMR under Patient Instructions for exercises provided during therapy sessions.    ASSESSMENT "     Daya is a 42 y.o. female referred to outpatient Physical Therapy with a medical diagnosis of chronic left sided thoracic pain, chronic bilateral low back pain, and postural imbalance. Patient presents with cervicoscapular and thoracic pain related to household and maternal responsibilities, all of which require periscapular, cervical, shoulder and posterior chain strength. Tendency to assume slouched posture. This combined with decreased postural strength may increase pain with repetitive and long duration tasks such as cooking, cleaning, lifting and carrying.     Patient prognosis is Good.   Patient will benefit from skilled outpatient Physical Therapy to address the deficits stated above and in the chart below, provide patient /family education, and to maximize patientt's level of independence.     Plan of care discussed with patient: Yes  Patient's spiritual, cultural and educational needs considered and patient is agreeable to the plan of care and goals as stated below:     Anticipated Barriers for therapy: Sedentary lifestyle; chronicity of pain    Medical Necessity is demonstrated by the following  History  Co-morbidities and personal factors that may impact the plan of care Co-morbidities:   Allergies    Personal Factors:   lifestyle     moderate   Examination  Body Structures and Functions, activity limitations and participation restrictions that may impact the plan of care Body Regions:   neck  upper extremities  trunk    Body Systems:    ROM  strength  transfers  transitions  motor control    Participation Restrictions:   Community ambulation    Activity limitations:   Learning and applying knowledge  no deficits    General Tasks and Commands  no deficits    Communication  no deficits    Mobility  lifting and carrying objects  walking    Self care  no deficits    Domestic Life  shopping  cooking  doing house work (cleaning house, washing dishes, laundry)  assisting  others    Interactions/Relationships  family relationships    Life Areas  no deficits    Community and Social Life  community life  recreation and leisure         high   Clinical Presentation evolving clinical presentation with changing clinical characteristics moderate   Decision Making/ Complexity Score: moderate     Short Term Goals (3 Weeks):   1. Patient will be independent with home exercise program to supplement therapy sessions in improving pain free mobility.  2. Patient will walk for 6' at 1 m/s without pain to promote return to communi  3. Patient will improve shoulder active range of motion to full to promote ease of reaching.  4. Patient will carry >/=15 lbs biased to one side without pain to promote carrying child.  Long Term Goals (6 Weeks):   1. Patient will improve FOTO Survey score to </=37% limitation to improve perceived limitation with changing and maintaining body positions.  2. Patient will stand through entire therapy session without pain to promote tolerance to cooking.   3. Patient will perform prone T and Y exercise for 3 x 10-15 reps unilaterally with contralateral hold and no pain to improve periscapular endurance for regular household tasks.    PLAN     Plan of care Certification: 5/16/2022 to 7/1/2022.    Outpatient Physical Therapy 2 times weekly for 6 weeks to include the following interventions: Cervical/Lumbar Traction, Electrical Stimulation -, Gait Training, Manual Therapy, Moist Heat/ Ice, Neuromuscular Re-ed, Patient Education, Self Care, Therapeutic Activities and Therapeutic Exercise.     Aracelis Wei, PT      I CERTIFY THE NEED FOR THESE SERVICES FURNISHED UNDER THIS PLAN OF TREATMENT AND WHILE UNDER MY CARE   Physician's comments:     Physician's Signature: ___________________________________________________

## 2022-05-18 PROBLEM — G89.29 CHRONIC LEFT-SIDED THORACIC BACK PAIN: Status: ACTIVE | Noted: 2022-05-18

## 2022-05-18 PROBLEM — M54.6 CHRONIC LEFT-SIDED THORACIC BACK PAIN: Status: ACTIVE | Noted: 2022-05-18

## 2022-05-18 PROBLEM — R53.1 DECREASED STRENGTH: Status: ACTIVE | Noted: 2022-05-18

## 2022-05-24 ENCOUNTER — CLINICAL SUPPORT (OUTPATIENT)
Dept: REHABILITATION | Facility: HOSPITAL | Age: 42
End: 2022-05-24
Payer: COMMERCIAL

## 2022-05-24 DIAGNOSIS — R53.1 DECREASED STRENGTH: ICD-10-CM

## 2022-05-24 DIAGNOSIS — G89.29 CHRONIC LEFT-SIDED THORACIC BACK PAIN: Primary | ICD-10-CM

## 2022-05-24 DIAGNOSIS — M54.6 CHRONIC LEFT-SIDED THORACIC BACK PAIN: Primary | ICD-10-CM

## 2022-05-24 PROCEDURE — 97140 MANUAL THERAPY 1/> REGIONS: CPT | Mod: PN

## 2022-05-24 PROCEDURE — 97110 THERAPEUTIC EXERCISES: CPT | Mod: PN

## 2022-05-24 NOTE — PROGRESS NOTES
"[facility]   Physical Therapy Treatment Note     Name: Daya Limon  Clinic Number: [id number]    Therapy Diagnosis:   Encounter Diagnoses   Name Primary?    Chronic left-sided thoracic back pain Yes    Decreased strength      Physician: Esthela Abdi MD    Visit Date: 5/24/2022    Physician Orders: PT Eval and Treat   Medical Diagnosis from Referral: : Chronic left-sided thoracic back pain [M54.6, G89.29], Postural imbalance [R29.3], Chronic bilateral low back pain without sciatica [M54.50, G89.29]  Evaluation Date: 5/16/2022  Authorization Period Expiration: 12/31/2022  Plan of Care Expiration: 7/1/2022  Progress Note Due: 6/16/2022  Visit # / Visits authorized: 1/50   FOTO: 1/5  PTA Visit #: 0/5     Time In: 1510  Time Out: 1605  Total Billable Time: 55 minutes    Precautions: Standard    SUBJECTIVE     Pt reports: decreased pain with exercise performance  She was compliant with home exercise program.  Response to previous treatment: decreased pain in the short term  Functional change: short term relief with home exercise program compliance    Pain: 4/10  Location: cervicothoracic spine     OBJECTIVE     Objective Measures updated at progress report unless specified.     Treatment     Daya received the treatments listed below:      therapeutic exercises to develop strength, endurance and ROM for 40 minutes including:    Seated:  Middle trapezius stretch: 3x30"  Shoulder external rotation with scapular retraction: green theraband 2x10   Thoracic extension: Towel roll at upper t-spine, x10     Standing:  Corner pec stretch: 3x30"  Wall open book: 2x10 bilateral  Wall jocelyn: 2x5  Rows: blue theraband 2x10  Straight arm pull down: red theraband 2x5    manual therapy techniques: were applied to the: thoracic area for 15 minutes, including:  Instrument assisted soft tissue mobilization to bilateral lumbar erector spinae  Grade III-IV thoracic central posterior to anterior " mobilizations    Patient Education and Home Exercises     Home Exercises Provided and Patient Education Provided     Education provided:   - Continue home exercise program. Added several exercises from today. Progress band for rows    Written Home Exercises Provided: yes. Exercises were reviewed and Daya was able to demonstrate them prior to the end of the session.  Daya demonstrated good  understanding of the education provided. See EMR under Patient Instructions for exercises provided during therapy sessions    ASSESSMENT     Daya is a 42 y.o. female referred to outpatient Physical Therapy with a medical diagnosis of chronic left sided thoracic pain, chronic bilateral low back pain, and postural imbalance. Patient presents with cervicoscapular and thoracic pain related to household and maternal responsibilities. Short term relief since initiating home exercise program. Soft tissue restrictions in thoracic paraspinals, greatest at mid-point of t-spine. Relief of both cervical and thoracic pain with repeated extensions.      This combined with decreased postural strength may increase pain with repetitive and long duration tasks such as cooking, cleaning, lifting and carrying.      Daya Is progressing well towards her goals.   Pt prognosis is Good.   Pt will continue to benefit from skilled outpatient physical therapy to address the deficits listed in the problem list box on initial evaluation, provide pt/family education and to maximize pt's level of independence in the home and community environment.     Pt's spiritual, cultural and educational needs considered and pt agreeable to plan of care and goals.  Anticipated barriers to physical therapy: Sedentary lifestyle; chronicity of pain    Short Term Goals (3 Weeks):   1. Patient will be independent with home exercise program to supplement therapy sessions in improving pain free mobility. Progressing, not met   2. Patient will walk for 6' at 1 m/s without pain  to promote return to community ambulation. Progressing, not met   3. Patient will improve shoulder active range of motion to full to promote ease of reaching. Progressing, not met   4. Patient will carry >/=15 lbs biased to one side without pain to promote carrying child. Progressing, not met   Long Term Goals (6 Weeks):   1. Patient will improve FOTO Survey score to </=37% limitation to improve perceived limitation with changing and maintaining body positions. Progressing, not met   2. Patient will stand through entire therapy session without pain to promote tolerance to cooking. Progressing, not met   3. Patient will perform prone T and Y exercise for 3 x 10-15 reps unilaterally with contralateral hold and no pain to improve periscapular endurance for regular household tasks. Progressing, not met     PLAN     Improve periscapular strength. Add farmer's carries and measure shoulder active range of motion next.    Aracelis Wei, PT

## 2022-05-31 ENCOUNTER — PATIENT MESSAGE (OUTPATIENT)
Dept: ADMINISTRATIVE | Facility: HOSPITAL | Age: 42
End: 2022-05-31
Payer: COMMERCIAL

## 2022-06-01 ENCOUNTER — CLINICAL SUPPORT (OUTPATIENT)
Dept: REHABILITATION | Facility: HOSPITAL | Age: 42
End: 2022-06-01
Payer: COMMERCIAL

## 2022-06-01 DIAGNOSIS — R53.1 DECREASED STRENGTH: ICD-10-CM

## 2022-06-01 DIAGNOSIS — M54.6 CHRONIC LEFT-SIDED THORACIC BACK PAIN: Primary | ICD-10-CM

## 2022-06-01 DIAGNOSIS — G89.29 CHRONIC LEFT-SIDED THORACIC BACK PAIN: Primary | ICD-10-CM

## 2022-06-01 PROCEDURE — 97140 MANUAL THERAPY 1/> REGIONS: CPT | Mod: PN,CQ

## 2022-06-01 PROCEDURE — 97110 THERAPEUTIC EXERCISES: CPT | Mod: PN,CQ

## 2022-06-01 NOTE — PROGRESS NOTES
"[facility]   Physical Therapy Treatment Note     Name: Daya Limon  Clinic Number: [id number]    Therapy Diagnosis:   Encounter Diagnoses   Name Primary?    Chronic left-sided thoracic back pain Yes    Decreased strength      Physician: Esthela Abdi MD    Visit Date: 6/1/2022    Physician Orders: PT Eval and Treat   Medical Diagnosis from Referral: : Chronic left-sided thoracic back pain [M54.6, G89.29], Postural imbalance [R29.3], Chronic bilateral low back pain without sciatica [M54.50, G89.29]  Evaluation Date: 5/16/2022  Authorization Period Expiration: 12/31/2022  Plan of Care Expiration: 7/1/2022  Progress Note Due: 6/16/2022  Visit # / Visits authorized: 3/50   FOTO: 3/5  PTA Visit #: 1/5     Time In: 1515  Time Out: 1600  Total Billable Time: 45 minutes (3TE, 1MT)    Precautions: Standard    SUBJECTIVE     Pt reports: Felt better following last treatment session but usual symptoms returned about a day later when she resumed her household duties.  She was compliant with home exercise program.  Response to previous treatment: decreased pain in the short term  Functional change: short term relief with home exercise program compliance    Pain: 4/10  Location: cervicothoracic spine     OBJECTIVE     Objective Measures updated at progress report unless specified.     Treatment     Daya received the treatments listed below:      therapeutic exercises to develop strength, endurance and ROM for 35 minutes including:    Seated:  Middle trapezius stretch: 3x30"  Shoulder external rotation with scapular retraction: green theraband 2x10   Thoracic extension: Towel roll at upper t-spine, x10     Standing:  Corner pec stretch: 3x30"  Wall open book: 2x10 bilateral  Wall jocelyn: 2x5  Rows: blue theraband 2x10  Straight arm pull down: red theraband 2x5    manual therapy techniques: were applied to the: thoracic area for 10 minutes, including:  Instrument assisted soft tissue mobilization " to bilateral lumbar erector spinae  Grade III-IV thoracic central posterior to anterior mobilizations    Patient Education and Home Exercises     Home Exercises Provided and Patient Education Provided     Education provided:   - Continue home exercise program. Added several exercises from today. Progress band for rows    Written Home Exercises Provided: yes. Exercises were reviewed and Daya was able to demonstrate them prior to the end of the session.  Daya demonstrated good  understanding of the education provided. See EMR under Patient Instructions for exercises provided during therapy sessions    ASSESSMENT     Daya is a 42 y.o. female referred to outpatient Physical Therapy with a medical diagnosis of chronic left sided thoracic pain, chronic bilateral low back pain, and postural imbalance. Patient presents with cervicoscapular and thoracic pain related to household and maternal responsibilities. Short term relief following last treatment session that lasted about a day before returning to usual symptoms. Able to complete all therex with minor cuing for form and no provocation of pain. Plan to address periscapular strengthening therex next visit.      This combined with decreased postural strength may increase pain with repetitive and long duration tasks such as cooking, cleaning, lifting and carrying.      Daya Is progressing well towards her goals.   Pt prognosis is Good.   Pt will continue to benefit from skilled outpatient physical therapy to address the deficits listed in the problem list box on initial evaluation, provide pt/family education and to maximize pt's level of independence in the home and community environment.     Pt's spiritual, cultural and educational needs considered and pt agreeable to plan of care and goals.  Anticipated barriers to physical therapy: Sedentary lifestyle; chronicity of pain    Short Term Goals (3 Weeks):   1. Patient will be independent with home exercise program to  supplement therapy sessions in improving pain free mobility. Progressing, not met   2. Patient will walk for 6' at 1 m/s without pain to promote return to community ambulation. Progressing, not met   3. Patient will improve shoulder active range of motion to full to promote ease of reaching. Progressing, not met   4. Patient will carry >/=15 lbs biased to one side without pain to promote carrying child. Progressing, not met   Long Term Goals (6 Weeks):   1. Patient will improve FOTO Survey score to </=37% limitation to improve perceived limitation with changing and maintaining body positions. Progressing, not met   2. Patient will stand through entire therapy session without pain to promote tolerance to cooking. Progressing, not met   3. Patient will perform prone T and Y exercise for 3 x 10-15 reps unilaterally with contralateral hold and no pain to improve periscapular endurance for regular household tasks. Progressing, not met     PLAN     Improve periscapular strength. Add farmer's carries and measure shoulder active range of motion next.    Jammie Stewart, PTA

## 2022-08-24 ENCOUNTER — PATIENT MESSAGE (OUTPATIENT)
Dept: ADMINISTRATIVE | Facility: HOSPITAL | Age: 42
End: 2022-08-24
Payer: COMMERCIAL

## 2022-10-10 ENCOUNTER — PATIENT MESSAGE (OUTPATIENT)
Dept: ADMINISTRATIVE | Facility: HOSPITAL | Age: 42
End: 2022-10-10
Payer: COMMERCIAL

## 2022-10-20 NOTE — PROGRESS NOTES
Office Visit    Patient Name: Daya Limon    : 1980  MRN: [id number]    Subjective:  Daya is a 42 y.o. female who presents today for:    Annual Exam, Cough, and Hand Pain (Left wrist pain)    Prior Physical 21  Most recent OV 22 URI/ViralSyndrome    42-year-old generally healthy female patient of mine with chronic GERD/LPR/allergic rhinitis symptoms associated with frequent cough, overweight BMI, chronic back pain, who presents today for annual physical.      She status post  delivery secondary to Non-reassuring fetal status on 21-- had her fourth daughter who is now doing well.    She is nursing and menstrual cycle just returned last month at 10 months of age.  That cycle was normal.     She had the flu last week when her daughters were ill with in-she did not get tested with the all tested positive through urgent care.  Only symptom currently is a cough, but she has struggled with this chronically, potentially mildly worse due to recent viral infection.  She has been using her albuterol as needed, has noticed a recurrence of a lot of her GERD symptoms since stopping Protonix.  Would like to review and is open to GI eval as she has struggled for so long with significant GERD symptoms.  She feels there are definite food triggers for her GERD and would like to see Allergy again to discuss, overall prior testing was unremarkable.    PT for back pain helped but needs a repeat referral.   Has not recovered her core strength since her .  Also having some left wrist pain with certain movements-posterior wrist especially with extremes of wrist flexion.  Mild localized swelling but no redness.     GENERAL LIFESTYLE HABITS:   DIET: generally healthy, some carbs, needs to drink more water  EXERCISE: needs to resume   SLEEP: interrupted but her children's poor sleep  WEIGHT: recently stable at BMI 28, still about 15 lbs above pre-pregnancy weight     SCREENING: MAMMOGRAM PREVIOUSLY ORDERED  BUT NOT PERFORMED-- RE-ORDERED- counseled regarding safety of mammogram during breastfeeding (just advised to tell her mammogram tach), Normal Pap with negative HPV test 3/15/22, Labs with HEP C Screen ordered     IMMUNIZATIONS: COVID-19 vaccine series ([employer]) completed 04/09/2021- BiVALENT BOOSTER ADVISED, YEARLY FLU SHOT ADVISED IN 6 WEEKS AS SHE IS JUST GETTING OVER THE FLU,  TDaP 10/5/21    EYE/DENTAL: EYE Dr Morrow 4/21/22,  and DENTAL DUE- has appt in November       PAST MEDICAL HISTORY, SURGICAL/SOCIAL/FAMILY HISTORY REVIEWED AS PER CHART, WITH PERTINENT FINDINGS INCLUDED IN HISTORY SECTION OF NOTE.     Current Medications    Medication List with Changes/Refills   New Medications    DICLOFENAC SODIUM (VOLTAREN) 1 % GEL    Apply 2 g topically 4 (four) times daily as needed.   Current Medications    AZELASTINE (ASTELIN) 137 MCG (0.1 %) NASAL SPRAY    1 spray (137 mcg total) by Nasal route 2 (two) times daily.    CHOLECALCIFEROL, VITAMIN D3, 125 MCG (5,000 UNIT) TAB    Take 1 tablet (5,000 Units total) by mouth daily with breakfast.    DEXTROMETHORPHAN HBR 2.5 MG LOZG    Use ase directed for cough    DOCUSATE SODIUM (COLACE) 100 MG CAPSULE    Take 1 capsule (100 mg total) by mouth 2 (two) times daily as needed for Constipation.    FAMOTIDINE (PEPCID) 40 MG TABLET    Take 1 tablet (40 mg total) by mouth every evening. Take in addition to Protonix in the morning for management of acid reflux    FLUTICASONE PROPIONATE (FLONASE) 50 MCG/ACTUATION NASAL SPRAY    2 sprays (100 mcg total) by Each Nostril route once daily. Take daily as needed for nasal congestion    IBUPROFEN (ADVIL,MOTRIN) 600 MG TABLET    Take 1 tablet (600 mg total) by mouth every 6 (six) hours as needed for Pain.    CELESTINE ROMO OINTMENT    Apply topically 3 (three) times daily. Apply after feeding. Do not wash off. This compounded medication expires in 30 days.    KETOCONAZOLE (NIZORAL) 2 % SHAMPOO    Apply topically twice a week.    PRENATAL VIT  "28-IRON FUM-FOLIC 27 MG IRON- 1 MG TAB    Take 1 tablet by mouth daily with breakfast.    TRIAMCINOLONE ACETONIDE 0.1% (KENALOG) 0.1 % OINTMENT    Apply topically 2 (two) times daily.   Changed and/or Refilled Medications    Modified Medication Previous Medication    ALBUTEROL (VENTOLIN HFA) 90 MCG/ACTUATION INHALER albuterol (VENTOLIN HFA) 90 mcg/actuation inhaler       Inhale 2 puffs into the lungs every 6 (six) hours as needed for Wheezing. Rescue    Inhale 2 puffs into the lungs every 6 (six) hours as needed for Wheezing. Rescue    LEVOCETIRIZINE (XYZAL) 5 MG TABLET levocetirizine (XYZAL) 5 MG tablet       Take 1 tablet (5 mg total) by mouth every evening.    Take 1 tablet (5 mg total) by mouth every evening.    PANTOPRAZOLE (PROTONIX) 40 MG TABLET pantoprazole (PROTONIX) 40 MG tablet       Take 1 tablet (40 mg total) by mouth before breakfast.    Take 1 tablet (40 mg total) by mouth 2 (two) times daily before meals.    PNV,CALCIUM 72-IRON-FOLIC ACID (WESTAB PLUS) 27 MG IRON- 1 MG TAB PNV,calcium 72-iron-folic acid (WESTAB PLUS) 27 mg iron- 1 mg Tab       Take 1 tablet (1 each total) by mouth once daily.    TAKE 1 TABLET BY MOUTH DAILY WITH BREAKFAST       Allergies   Review of patient's allergies indicates:  No Known Allergies      Review of Systems (Pertinent positives)  Review of Systems   Constitutional:  Negative for unexpected weight change.   HENT:  Positive for postnasal drip (with throat clearing).    Respiratory:  Positive for cough. Negative for shortness of breath.    Cardiovascular:  Negative for chest pain.   Musculoskeletal:  Positive for arthralgias and back pain.   Allergic/Immunologic: Positive for environmental allergies.   Psychiatric/Behavioral:  Positive for sleep disturbance.      /76 (BP Location: Right arm, Patient Position: Sitting, BP Method: Medium (Manual))   Pulse 93   Temp 98.1 °F (36.7 °C)   Ht 5' 1" (1.549 m)   Wt 67.9 kg (149 lb 11.1 oz)   SpO2 98%   BMI 28.28 kg/m² "     Physical Exam  Vitals reviewed.   Constitutional:       General: She is not in acute distress.     Appearance: Normal appearance. She is well-developed.   HENT:      Head: Normocephalic and atraumatic.      Right Ear: Ear canal normal. Tympanic membrane is not erythematous or bulging.      Left Ear: Ear canal normal. Tympanic membrane is not erythematous or bulging.      Nose: Nose normal.      Mouth/Throat:      Mouth: Mucous membranes are moist.      Pharynx: No oropharyngeal exudate.   Eyes:      Extraocular Movements: Extraocular movements intact.      Conjunctiva/sclera: Conjunctivae normal.   Neck:      Thyroid: No thyroid mass or thyromegaly.      Vascular: No carotid bruit.   Cardiovascular:      Rate and Rhythm: Normal rate and regular rhythm.      Pulses:           Dorsalis pedis pulses are 2+ on the right side and 2+ on the left side.      Heart sounds: Normal heart sounds. No murmur heard.  Pulmonary:      Effort: Pulmonary effort is normal. No respiratory distress.      Breath sounds: Normal breath sounds.   Abdominal:      General: Bowel sounds are normal. There is no distension.      Palpations: Abdomen is soft. There is no mass.      Tenderness: There is no abdominal tenderness.   Musculoskeletal:         General: Normal range of motion.      Right lower leg: No edema.      Left lower leg: No edema.   Lymphadenopathy:      Cervical: No cervical adenopathy.   Skin:     General: Skin is warm and dry.      Findings: No rash.   Neurological:      General: No focal deficit present.      Mental Status: She is alert and oriented to person, place, and time.   Psychiatric:         Mood and Affect: Mood normal.         Behavior: Behavior normal.         Assessment/Plan:  Daya Limon is a 42 y.o. female who presents today for :        ICD-10-CM ICD-9-CM    1. Routine general medical examination at a health care facility  Z00.00 V70.0 Hemoglobin A1C      Comprehensive Metabolic Panel      Lipid Panel       CBC Auto Differential      TSH      Vitamin D      Ferritin      Iron and TIBC      Hepatitis C Antibody      2. Overweight (BMI 25.0-29.9)  E66.3 278.02       3. Gastroesophageal reflux disease, unspecified whether esophagitis present  K21.9 530.81 Ambulatory referral/consult to Allergy      Ambulatory referral/consult to Gastroenterology      pantoprazole (PROTONIX) 40 MG tablet      4. Post-viral cough syndrome  R05.8 786.2 albuterol (VENTOLIN HFA) 90 mcg/actuation inhaler    HAD INFLUENZA  10/2022      5. Right thyroid nodule  E04.1 241.0       6. Chronic left-sided low back pain without sciatica  M54.50 724.2 Ambulatory referral/consult to Physical/Occupational Therapy    G89.29 338.29       7. Vitamin D deficiency  E55.9 268.9       8. Seasonal allergies  J30.2 477.9 Ambulatory referral/consult to Allergy      levocetirizine (XYZAL) 5 MG tablet      9. Screening mammogram for breast cancer  Z12.31 V76.12 Mammo Digital Screening Bilat      10. Needs flu shot  Z23 V04.81 CANCELED: Influenza - Quadrivalent *Preferred* (6 months+) (PF)    BECAUSE YOU RECENTLY HAD THE FLU, ADVISE SEASONAL FLU SHOT IN ABOUT 6 WEEKS TO PROTECT AGAINST OTHER FLU VARIANTS      11. Lactating mother  Z39.1 V24.1 PNV,calcium 72-iron-folic acid (WESTAB PLUS) 27 mg iron- 1 mg Tab      12. Nocturnal cough with wheeze  R05.8 786.2 albuterol (VENTOLIN HFA) 90 mcg/actuation inhaler    R06.2 786.07       13. Cough due to bronchospasm  J98.01 519.11 albuterol (VENTOLIN HFA) 90 mcg/actuation inhaler      14. Left wrist tendonitis  M77.8 727.05 diclofenac sodium (VOLTAREN) 1 % Gel      15. History of  section  Z98.891 V45.89 Ambulatory referral/consult to Physical/Occupational Therapy      16. Chronic bilateral back pain, unspecified back location  M54.9 724.5 Ambulatory referral/consult to Physical/Occupational Therapy    G89.29 338.29         ADVISED ON DIET/EXERCISE/SLEEP, ROUTINE EYE/DENTAL EXAMS, AND THE IMPORTANCE OF KEEPING UP WITH  "APPROPRIATE SCREENING TESTS BASED ON AGE AND RISK FACTORS.  MAMMOGRAM ORDERED- advised that this is safe even while breastfeeding, LABS WITH HEP C SCREEN PENDING,ADVISE BASED ON RESULTS  FLU SHOT ADVISED IN 6 WEEKS AS SHE IS JUST GETTING OVER THE FLU FROM LAST WEEK, COVID-19 BIVALENT BOOSTER ADVISED    OVERWEIGHT BMI:  Having trouble using some of her residual pregnancy weight.  Will start with some physical therapy to help with back pain and hopefully to get on track with a core strengthening regimen status post .  Labs to screen for pre diabetes, cholesterol, etc..      CHRONIC COUGH SECONDARY TO GERD/LPR, CHRONIC ALLERGIC RHINITIS:  Needs to resume daily PPI, GI referral advised given her long-standing frustrating GERD symptoms.  Continue daily antihistamine, and albuterol p.r.n. for associated bronchospastic cough with wheeze that is likely exacerbated by recent bout with influenza.      TENDINITIS OF THE LEFT WRIST:  Topical Voltaren gel, wrist compression sleeves a follow-up support the resting keep it in a more neutral position, mindful of organized mixed especially with the new baby     CHRONIC BACK PAIN, CORE STRENGTH DEFICITS STATUS POST :  PT referral placed.  Oral analgesics like Tylenol/Advil p.r.n..        Patient Instructions   OBTAIN FLU SHOT IN APPROXIMATELY 6 WEEKS-THERE ARE OTHER VARIANTS OF THE FLU THAT THE SHOT WILL PROTECT YOU AGAINST OTHER THAN THE 1 YOU WERE RECENTLY ILL WITH.      COVID-19 BIVALENT "OMICRON" BOOSTER ALSO ADVISED      Follow up for to follow up on lab results, return as needed for new concerns.      "

## 2022-10-21 ENCOUNTER — OFFICE VISIT (OUTPATIENT)
Dept: FAMILY MEDICINE | Facility: CLINIC | Age: 42
End: 2022-10-21
Payer: COMMERCIAL

## 2022-10-21 VITALS
SYSTOLIC BLOOD PRESSURE: 118 MMHG | HEART RATE: 93 BPM | BODY MASS INDEX: 28.26 KG/M2 | TEMPERATURE: 98 F | OXYGEN SATURATION: 98 % | WEIGHT: 149.69 LBS | HEIGHT: 61 IN | DIASTOLIC BLOOD PRESSURE: 76 MMHG

## 2022-10-21 DIAGNOSIS — Z23 NEEDS FLU SHOT: ICD-10-CM

## 2022-10-21 DIAGNOSIS — J30.2 SEASONAL ALLERGIES: ICD-10-CM

## 2022-10-21 DIAGNOSIS — R05.8 POST-VIRAL COUGH SYNDROME: ICD-10-CM

## 2022-10-21 DIAGNOSIS — R06.2 NOCTURNAL COUGH WITH WHEEZE: ICD-10-CM

## 2022-10-21 DIAGNOSIS — K21.9 GASTROESOPHAGEAL REFLUX DISEASE, UNSPECIFIED WHETHER ESOPHAGITIS PRESENT: ICD-10-CM

## 2022-10-21 DIAGNOSIS — R05.8 NOCTURNAL COUGH WITH WHEEZE: ICD-10-CM

## 2022-10-21 DIAGNOSIS — Z00.00 ROUTINE GENERAL MEDICAL EXAMINATION AT A HEALTH CARE FACILITY: Primary | ICD-10-CM

## 2022-10-21 DIAGNOSIS — Z12.31 SCREENING MAMMOGRAM FOR BREAST CANCER: ICD-10-CM

## 2022-10-21 DIAGNOSIS — M54.9 CHRONIC BILATERAL BACK PAIN, UNSPECIFIED BACK LOCATION: ICD-10-CM

## 2022-10-21 DIAGNOSIS — J98.01 COUGH DUE TO BRONCHOSPASM: ICD-10-CM

## 2022-10-21 DIAGNOSIS — E04.1 RIGHT THYROID NODULE: ICD-10-CM

## 2022-10-21 DIAGNOSIS — E66.3 OVERWEIGHT (BMI 25.0-29.9): ICD-10-CM

## 2022-10-21 DIAGNOSIS — G89.29 CHRONIC BILATERAL BACK PAIN, UNSPECIFIED BACK LOCATION: ICD-10-CM

## 2022-10-21 DIAGNOSIS — E55.9 VITAMIN D DEFICIENCY: ICD-10-CM

## 2022-10-21 DIAGNOSIS — Z98.891 HISTORY OF CESAREAN SECTION: ICD-10-CM

## 2022-10-21 DIAGNOSIS — M54.50 CHRONIC LEFT-SIDED LOW BACK PAIN WITHOUT SCIATICA: ICD-10-CM

## 2022-10-21 DIAGNOSIS — G89.29 CHRONIC LEFT-SIDED LOW BACK PAIN WITHOUT SCIATICA: ICD-10-CM

## 2022-10-21 DIAGNOSIS — M77.8 LEFT WRIST TENDONITIS: ICD-10-CM

## 2022-10-21 PROCEDURE — 3074F SYST BP LT 130 MM HG: CPT | Mod: CPTII,S$GLB,, | Performed by: FAMILY MEDICINE

## 2022-10-21 PROCEDURE — 99999 PR PBB SHADOW E&M-EST. PATIENT-LVL V: ICD-10-PCS | Mod: PBBFAC,,, | Performed by: FAMILY MEDICINE

## 2022-10-21 PROCEDURE — 99999 PR PBB SHADOW E&M-EST. PATIENT-LVL V: CPT | Mod: PBBFAC,,, | Performed by: FAMILY MEDICINE

## 2022-10-21 PROCEDURE — 3078F PR MOST RECENT DIASTOLIC BLOOD PRESSURE < 80 MM HG: ICD-10-PCS | Mod: CPTII,S$GLB,, | Performed by: FAMILY MEDICINE

## 2022-10-21 PROCEDURE — 99396 PR PREVENTIVE VISIT,EST,40-64: ICD-10-PCS | Mod: S$GLB,,, | Performed by: FAMILY MEDICINE

## 2022-10-21 PROCEDURE — 1159F PR MEDICATION LIST DOCUMENTED IN MEDICAL RECORD: ICD-10-PCS | Mod: CPTII,S$GLB,, | Performed by: FAMILY MEDICINE

## 2022-10-21 PROCEDURE — 99396 PREV VISIT EST AGE 40-64: CPT | Mod: S$GLB,,, | Performed by: FAMILY MEDICINE

## 2022-10-21 PROCEDURE — 3074F PR MOST RECENT SYSTOLIC BLOOD PRESSURE < 130 MM HG: ICD-10-PCS | Mod: CPTII,S$GLB,, | Performed by: FAMILY MEDICINE

## 2022-10-21 PROCEDURE — 3078F DIAST BP <80 MM HG: CPT | Mod: CPTII,S$GLB,, | Performed by: FAMILY MEDICINE

## 2022-10-21 PROCEDURE — 1159F MED LIST DOCD IN RCRD: CPT | Mod: CPTII,S$GLB,, | Performed by: FAMILY MEDICINE

## 2022-10-21 RX ORDER — PANTOPRAZOLE SODIUM 40 MG/1
40 TABLET, DELAYED RELEASE ORAL
Qty: 90 TABLET | Refills: 4 | Status: SHIPPED | OUTPATIENT
Start: 2022-10-21 | End: 2023-03-01 | Stop reason: SDUPTHER

## 2022-10-21 RX ORDER — LEVOCETIRIZINE DIHYDROCHLORIDE 5 MG/1
5 TABLET, FILM COATED ORAL NIGHTLY
Qty: 30 TABLET | Refills: 11 | Status: SHIPPED | OUTPATIENT
Start: 2022-10-21 | End: 2023-06-12 | Stop reason: SDUPTHER

## 2022-10-21 RX ORDER — DICLOFENAC SODIUM 10 MG/G
2 GEL TOPICAL 4 TIMES DAILY PRN
Qty: 100 G | Refills: 11 | Status: SHIPPED | OUTPATIENT
Start: 2022-10-21 | End: 2023-06-12 | Stop reason: SDUPTHER

## 2022-10-21 RX ORDER — ALBUTEROL SULFATE 90 UG/1
2 AEROSOL, METERED RESPIRATORY (INHALATION) EVERY 6 HOURS PRN
Qty: 8.5 G | Refills: 11 | Status: SHIPPED | OUTPATIENT
Start: 2022-10-21 | End: 2023-02-06

## 2022-10-21 NOTE — PATIENT INSTRUCTIONS
"OBTAIN FLU SHOT IN APPROXIMATELY 6 WEEKS-THERE ARE OTHER VARIANTS OF THE FLU THAT THE SHOT WILL PROTECT YOU AGAINST OTHER THAN THE 1 YOU WERE RECENTLY ILL WITH.      COVID-19 BIVALENT "OMICRON" BOOSTER ALSO ADVISED  "

## 2022-10-22 ENCOUNTER — LAB VISIT (OUTPATIENT)
Dept: LAB | Facility: HOSPITAL | Age: 42
End: 2022-10-22
Attending: FAMILY MEDICINE
Payer: COMMERCIAL

## 2022-10-22 DIAGNOSIS — Z00.00 ROUTINE GENERAL MEDICAL EXAMINATION AT A HEALTH CARE FACILITY: ICD-10-CM

## 2022-10-22 LAB
25(OH)D3+25(OH)D2 SERPL-MCNC: 18 NG/ML (ref 30–96)
ALBUMIN SERPL BCP-MCNC: 4.1 G/DL (ref 3.5–5.2)
ALP SERPL-CCNC: 87 U/L (ref 55–135)
ALT SERPL W/O P-5'-P-CCNC: 26 U/L (ref 10–44)
ANION GAP SERPL CALC-SCNC: 7 MMOL/L (ref 8–16)
AST SERPL-CCNC: 26 U/L (ref 10–40)
BASOPHILS # BLD AUTO: 0.02 K/UL (ref 0–0.2)
BASOPHILS NFR BLD: 0.3 % (ref 0–1.9)
BILIRUB SERPL-MCNC: 0.5 MG/DL (ref 0.1–1)
BUN SERPL-MCNC: 12 MG/DL (ref 6–20)
CALCIUM SERPL-MCNC: 9.3 MG/DL (ref 8.7–10.5)
CHLORIDE SERPL-SCNC: 108 MMOL/L (ref 95–110)
CHOLEST SERPL-MCNC: 170 MG/DL (ref 120–199)
CHOLEST/HDLC SERPL: 3.3 {RATIO} (ref 2–5)
CO2 SERPL-SCNC: 24 MMOL/L (ref 23–29)
CREAT SERPL-MCNC: 0.6 MG/DL (ref 0.5–1.4)
DIFFERENTIAL METHOD: NORMAL
EOSINOPHIL # BLD AUTO: 0.3 K/UL (ref 0–0.5)
EOSINOPHIL NFR BLD: 4.7 % (ref 0–8)
ERYTHROCYTE [DISTWIDTH] IN BLOOD BY AUTOMATED COUNT: 13.9 % (ref 11.5–14.5)
EST. GFR  (NO RACE VARIABLE): >60 ML/MIN/1.73 M^2
ESTIMATED AVG GLUCOSE: 108 MG/DL (ref 68–131)
FERRITIN SERPL-MCNC: 76 NG/ML (ref 20–300)
GLUCOSE SERPL-MCNC: 86 MG/DL (ref 70–110)
HBA1C MFR BLD: 5.4 % (ref 4–5.6)
HCT VFR BLD AUTO: 43 % (ref 37–48.5)
HCV AB SERPL QL IA: NORMAL
HDLC SERPL-MCNC: 51 MG/DL (ref 40–75)
HDLC SERPL: 30 % (ref 20–50)
HGB BLD-MCNC: 13.9 G/DL (ref 12–16)
IMM GRANULOCYTES # BLD AUTO: 0.01 K/UL (ref 0–0.04)
IMM GRANULOCYTES NFR BLD AUTO: 0.1 % (ref 0–0.5)
IRON SERPL-MCNC: 74 UG/DL (ref 30–160)
LDLC SERPL CALC-MCNC: 105 MG/DL (ref 63–159)
LYMPHOCYTES # BLD AUTO: 2.6 K/UL (ref 1–4.8)
LYMPHOCYTES NFR BLD: 37.5 % (ref 18–48)
MCH RBC QN AUTO: 27.7 PG (ref 27–31)
MCHC RBC AUTO-ENTMCNC: 32.3 G/DL (ref 32–36)
MCV RBC AUTO: 86 FL (ref 82–98)
MONOCYTES # BLD AUTO: 0.5 K/UL (ref 0.3–1)
MONOCYTES NFR BLD: 6.8 % (ref 4–15)
NEUTROPHILS # BLD AUTO: 3.5 K/UL (ref 1.8–7.7)
NEUTROPHILS NFR BLD: 50.6 % (ref 38–73)
NONHDLC SERPL-MCNC: 119 MG/DL
NRBC BLD-RTO: 0 /100 WBC
PLATELET # BLD AUTO: 227 K/UL (ref 150–450)
PMV BLD AUTO: 11.5 FL (ref 9.2–12.9)
POTASSIUM SERPL-SCNC: 4.1 MMOL/L (ref 3.5–5.1)
PROT SERPL-MCNC: 7.6 G/DL (ref 6–8.4)
RBC # BLD AUTO: 5.01 M/UL (ref 4–5.4)
SATURATED IRON: 17 % (ref 20–50)
SODIUM SERPL-SCNC: 139 MMOL/L (ref 136–145)
T4 FREE SERPL-MCNC: 0.8 NG/DL (ref 0.71–1.51)
TOTAL IRON BINDING CAPACITY: 426 UG/DL (ref 250–450)
TRANSFERRIN SERPL-MCNC: 288 MG/DL (ref 200–375)
TRIGL SERPL-MCNC: 70 MG/DL (ref 30–150)
TSH SERPL DL<=0.005 MIU/L-ACNC: 4.13 UIU/ML (ref 0.4–4)
WBC # BLD AUTO: 6.96 K/UL (ref 3.9–12.7)

## 2022-10-22 PROCEDURE — 85025 COMPLETE CBC W/AUTO DIFF WBC: CPT | Performed by: FAMILY MEDICINE

## 2022-10-22 PROCEDURE — 36415 COLL VENOUS BLD VENIPUNCTURE: CPT | Performed by: FAMILY MEDICINE

## 2022-10-22 PROCEDURE — 84466 ASSAY OF TRANSFERRIN: CPT | Performed by: FAMILY MEDICINE

## 2022-10-22 PROCEDURE — 82728 ASSAY OF FERRITIN: CPT | Performed by: FAMILY MEDICINE

## 2022-10-22 PROCEDURE — 86803 HEPATITIS C AB TEST: CPT | Performed by: FAMILY MEDICINE

## 2022-10-22 PROCEDURE — 83036 HEMOGLOBIN GLYCOSYLATED A1C: CPT | Performed by: FAMILY MEDICINE

## 2022-10-22 PROCEDURE — 82306 VITAMIN D 25 HYDROXY: CPT | Performed by: FAMILY MEDICINE

## 2022-10-22 PROCEDURE — 84439 ASSAY OF FREE THYROXINE: CPT | Performed by: FAMILY MEDICINE

## 2022-10-22 PROCEDURE — 84443 ASSAY THYROID STIM HORMONE: CPT | Performed by: FAMILY MEDICINE

## 2022-10-22 PROCEDURE — 80053 COMPREHEN METABOLIC PANEL: CPT | Performed by: FAMILY MEDICINE

## 2022-10-22 PROCEDURE — 80061 LIPID PANEL: CPT | Performed by: FAMILY MEDICINE

## 2022-10-23 ENCOUNTER — TELEPHONE (OUTPATIENT)
Dept: FAMILY MEDICINE | Facility: CLINIC | Age: 42
End: 2022-10-23
Payer: COMMERCIAL

## 2022-10-23 DIAGNOSIS — E55.9 VITAMIN D DEFICIENCY: ICD-10-CM

## 2022-10-23 DIAGNOSIS — R79.89 ABNORMAL TSH: Primary | ICD-10-CM

## 2022-10-23 RX ORDER — ACETAMINOPHEN 500 MG
5000 TABLET ORAL
Qty: 90 TABLET | Refills: 3 | Status: SHIPPED | OUTPATIENT
Start: 2022-10-23 | End: 2024-01-16

## 2022-10-31 ENCOUNTER — OFFICE VISIT (OUTPATIENT)
Dept: ALLERGY | Facility: CLINIC | Age: 42
End: 2022-10-31
Payer: COMMERCIAL

## 2022-10-31 VITALS — WEIGHT: 149.5 LBS | BODY MASS INDEX: 28.22 KG/M2 | HEIGHT: 61 IN

## 2022-10-31 DIAGNOSIS — J30.2 SEASONAL ALLERGIES: ICD-10-CM

## 2022-10-31 DIAGNOSIS — R05.9 COUGH, UNSPECIFIED TYPE: ICD-10-CM

## 2022-10-31 DIAGNOSIS — K21.9 GASTROESOPHAGEAL REFLUX DISEASE, UNSPECIFIED WHETHER ESOPHAGITIS PRESENT: Primary | ICD-10-CM

## 2022-10-31 PROCEDURE — 99214 OFFICE O/P EST MOD 30 MIN: CPT | Mod: S$GLB,,, | Performed by: ALLERGY & IMMUNOLOGY

## 2022-10-31 PROCEDURE — 3044F HG A1C LEVEL LT 7.0%: CPT | Mod: CPTII,S$GLB,, | Performed by: ALLERGY & IMMUNOLOGY

## 2022-10-31 PROCEDURE — 3008F PR BODY MASS INDEX (BMI) DOCUMENTED: ICD-10-PCS | Mod: CPTII,S$GLB,, | Performed by: ALLERGY & IMMUNOLOGY

## 2022-10-31 PROCEDURE — 99999 PR PBB SHADOW E&M-EST. PATIENT-LVL III: CPT | Mod: PBBFAC,,, | Performed by: ALLERGY & IMMUNOLOGY

## 2022-10-31 PROCEDURE — 3044F PR MOST RECENT HEMOGLOBIN A1C LEVEL <7.0%: ICD-10-PCS | Mod: CPTII,S$GLB,, | Performed by: ALLERGY & IMMUNOLOGY

## 2022-10-31 PROCEDURE — 99214 PR OFFICE/OUTPT VISIT, EST, LEVL IV, 30-39 MIN: ICD-10-PCS | Mod: S$GLB,,, | Performed by: ALLERGY & IMMUNOLOGY

## 2022-10-31 PROCEDURE — 3008F BODY MASS INDEX DOCD: CPT | Mod: CPTII,S$GLB,, | Performed by: ALLERGY & IMMUNOLOGY

## 2022-10-31 PROCEDURE — 99999 PR PBB SHADOW E&M-EST. PATIENT-LVL III: ICD-10-PCS | Mod: PBBFAC,,, | Performed by: ALLERGY & IMMUNOLOGY

## 2022-10-31 NOTE — PROGRESS NOTES
Chief Complaint  No chief complaint on file.      SUKHWINDER Limon is a 42 y.o. female with GERD and allergic rhinitis presenting for postprandial cough and wheezing.     Mrs. Limon reports a 5 year history of cough occurring ~2 hours after eating certain foods, which has gotten worse in the last 2 years, progressing to wheezing and shortness of breath requiring albuterol. Albuterol provides adequate relief. She reports that these symptoms can occur after any meal, but are more common (80% of episodes) at night. Triggering foods include milk, sweets, and all fruits, especially citrus. The cough occurs most evenings and she requires albuterol 1-2 nights/week. It has started to interfere with sleep. She denies associated facial/oral swelling, immediate wheezing, urticaria, rash, vomiting, dizziness, and oral tingling/irritation. She saw Dr. Camargo for this issue on 04/06/21 and underwent thorough allergy testing, for which all food allergies were negative. Of note, she has a history of GERD on pantoprazole 40mg QD.     For her allergic rhinitis symptoms, she reports rhinorrhea, sinus pressure, and occasionally itchy, watery eyes. She takes nightly xyzal, which she reports provides relief. If she forgets to take it, she experiences sneezing and post-nasal drip with sore throat, which resolves once she takes her medication. She has tried Flonase irregularly and has never tried Astelin. She reports worsening of rhinitis symptoms when the weather changes, but cannot pinpoint any seasonality.     She denies frequent infections, particularly those requiring antibiotics. She does not have a history of asthma or eczema. No known venom or drug allergies.   She is a lifetime nonsmoker and there are no smokers in her household. There are no pets or carpeted floors in the home.       PAST MEDICAL HISTORY:  Past Medical History:   Diagnosis Date    GERD (gastroesophageal reflux disease) 4/12/2017    Meibomian gland dysfunction  (MGD)     Right thyroid nodule 10/20/2019    S/P (-) FNA 19 per endocrine dr bass     S/P primary low transverse  2021    Vitamin D deficiency 2017       PAST SURGICAL HISTORY:  Past Surgical History:   Procedure Laterality Date     SECTION N/A 2021    Procedure:  SECTION;  Surgeon: Lashanda Liu DO;  Location: [facility] L&D;  Service: OB/GYN;  Laterality: N/A;    VAGINAL DELIVERY      X2       SOCIAL HISTORY:  Social History     Socioeconomic History    Marital status:     Number of children: 2   Tobacco Use    Smoking status: Never    Smokeless tobacco: Never   Substance and Sexual Activity    Alcohol use: No    Drug use: No    Sexual activity: Yes     Partners: Male     Birth control/protection: None       FAMILY HISTORY:  Family History   Problem Relation Age of Onset    Diabetes Mother     Diabetes Father     Kidney disease Father     Kidney failure Father     Cataracts Father     No Known Problems Sister     No Known Problems Brother     No Known Problems Maternal Aunt     No Known Problems Maternal Uncle     No Known Problems Paternal Aunt     No Known Problems Paternal Uncle     No Known Problems Maternal Grandmother     No Known Problems Maternal Grandfather     No Known Problems Paternal Grandmother     No Known Problems Paternal Grandfather     Breast cancer Neg Hx     Colon cancer Neg Hx     Ovarian cancer Neg Hx     Hypertension Neg Hx     Stroke Neg Hx     Amblyopia Neg Hx     Blindness Neg Hx     Cancer Neg Hx     Glaucoma Neg Hx     Macular degeneration Neg Hx     Retinal detachment Neg Hx     Strabismus Neg Hx     Thyroid disease Neg Hx     Thyroid nodules Neg Hx     Thyroid cancer Neg Hx        ALLERGIES AND MEDICATIONS: updated and reviewed.  Review of patient's allergies indicates:  No Known Allergies  Current Outpatient Medications   Medication Sig Dispense Refill    azelastine (ASTELIN) 137 mcg (0.1 %) nasal spray 1 spray (137 mcg total) by  Nasal route 2 (two) times daily. 30 mL 11    cholecalciferol, vitamin D3, 125 mcg (5,000 unit) Tab Take 1 tablet (5,000 Units total) by mouth daily with breakfast. 90 tablet 3    fluticasone propionate (FLONASE) 50 mcg/actuation nasal spray 2 sprays (100 mcg total) by Each Nostril route once daily. Take daily as needed for nasal congestion 16 g 11    levocetirizine (XYZAL) 5 MG tablet Take 1 tablet (5 mg total) by mouth every evening. 30 tablet 11    pantoprazole (PROTONIX) 40 MG tablet Take 1 tablet (40 mg total) by mouth before breakfast. 90 tablet 4    prenatal vit 28-iron fum-folic 27 mg iron- 1 mg Tab Take 1 tablet by mouth daily with breakfast. 90 tablet 3    albuterol (VENTOLIN HFA) 90 mcg/actuation inhaler Inhale 2 puffs into the lungs every 6 (six) hours as needed for Wheezing. Rescue (Patient not taking: Reported on 10/31/2022) 8.5 g 11    dextromethorphan HBr 2.5 mg Lozg Use ase directed for cough (Patient not taking: Reported on 10/21/2022) 100 each 0    diclofenac sodium (VOLTAREN) 1 % Gel Apply 2 g topically 4 (four) times daily as needed. (Patient not taking: Reported on 10/31/2022) 100 g 11    docusate sodium (COLACE) 100 MG capsule Take 1 capsule (100 mg total) by mouth 2 (two) times daily as needed for Constipation. (Patient not taking: Reported on 10/21/2022) 30 capsule 3    famotidine (PEPCID) 40 MG tablet Take 1 tablet (40 mg total) by mouth every evening. Take in addition to Protonix in the morning for management of acid reflux (Patient not taking: Reported on 10/31/2022) 90 tablet 4    ibuprofen (ADVIL,MOTRIN) 600 MG tablet Take 1 tablet (600 mg total) by mouth every 6 (six) hours as needed for Pain. (Patient not taking: Reported on 10/31/2022) 30 tablet 3    jack perry ointment Apply topically 3 (three) times daily. Apply after feeding. Do not wash off. This compounded medication expires in 30 days. (Patient not taking: Reported on 10/21/2022) 30 g 1    ketoconazole (NIZORAL) 2 % shampoo  "Apply topically twice a week. (Patient not taking: Reported on 10/21/2022) 120 mL 11    PNV,calcium 72-iron-folic acid (WESTAB PLUS) 27 mg iron- 1 mg Tab Take 1 tablet (1 each total) by mouth once daily. (Patient not taking: Reported on 10/31/2022) 90 tablet 4    triamcinolone acetonide 0.1% (KENALOG) 0.1 % ointment Apply topically 2 (two) times daily. (Patient not taking: Reported on 10/31/2022) 80 g 3     Current Facility-Administered Medications   Medication Dose Route Frequency Provider Last Rate Last Admin    triamcinolone acetonide injection 80 mg  80 mg Intramuscular 1 time in Clinic/HOD Esthela Abdi MD           ROS  Review of Systems   Constitutional:  Negative for activity change, chills and fever.   HENT:  Positive for postnasal drip, rhinorrhea, sinus pressure and sneezing. Negative for facial swelling and trouble swallowing.    Eyes:  Positive for itching.        Reports occasional itchy, watery eyes   Respiratory:  Positive for cough. Negative for shortness of breath.         Cough occurs ~2 hours after eating certain foods. May progress to shortness of breath and wheeze. Relieved with albuterol.   No SOB/wheeze at baseline.    Cardiovascular:  Negative for chest pain.   Gastrointestinal:  Negative for constipation, diarrhea, nausea and vomiting.   Skin:  Negative for rash.   Allergic/Immunologic: Negative for food allergies and immunocompromised state.   Neurological:  Negative for dizziness and syncope.       Physical Exam  Vitals:    10/31/22 1319   Weight: 67.8 kg (149 lb 7.6 oz)   Height: 5' 1" (1.549 m)    Body mass index is 28.24 kg/m².  Weight: 67.8 kg (149 lb 7.6 oz)   Height: 5' 1" (154.9 cm)     Physical Exam  Constitutional:       General: She is not in acute distress.     Appearance: Normal appearance. She is normal weight.   HENT:      Head: Normocephalic.      Nose: Nose normal. No congestion or rhinorrhea.      Mouth/Throat:      Mouth: Mucous membranes are moist.      Pharynx: " Oropharynx is clear. No oropharyngeal exudate or posterior oropharyngeal erythema.   Eyes:      Conjunctiva/sclera: Conjunctivae normal.   Cardiovascular:      Rate and Rhythm: Normal rate and regular rhythm.      Pulses: Normal pulses.      Heart sounds: Normal heart sounds. No murmur heard.  Pulmonary:      Effort: Pulmonary effort is normal.      Breath sounds: Normal breath sounds. No wheezing.   Abdominal:      General: Bowel sounds are normal. There is no distension.   Musculoskeletal:      Cervical back: Normal range of motion and neck supple. No tenderness.   Lymphadenopathy:      Cervical: No cervical adenopathy.   Skin:     General: Skin is warm and dry.   Neurological:      General: No focal deficit present.      Mental Status: She is alert and oriented to person, place, and time.       Assessment and Plan:  42 y.o. female with GERD and allergic rhinitis presenting for postprandial cough with wheezing.   Cough occurs 2 hours after eating and patient is not experiencing any allergic symptoms.  Cough and wheeze likely due to GERD > allergy given patient history.    Gastroesophageal reflux disease, unspecified whether esophagitis present  - Patient is scheduled for Gastroenterology visit on 11/03  - Continue taking pantoprazole 40mg QD; discuss with GI before increasing to BID  - Discussed relationship between GERD and cough/wheezing, as well as lifestyle modifications    Seasonal allergies  - Continue taking xzyal for control of allergic rhinitis symptoms  - Consider adding Flonase 1-2 squirts / nostril QD if symptoms persist      Annabelle Hudson MS4  [email or web address]

## 2022-11-03 ENCOUNTER — OFFICE VISIT (OUTPATIENT)
Dept: GASTROENTEROLOGY | Facility: CLINIC | Age: 42
End: 2022-11-03
Payer: COMMERCIAL

## 2022-11-03 VITALS — BODY MASS INDEX: 28.05 KG/M2 | WEIGHT: 148.56 LBS | HEIGHT: 61 IN

## 2022-11-03 DIAGNOSIS — R05.3 CHRONIC COUGH: ICD-10-CM

## 2022-11-03 DIAGNOSIS — K21.9 GASTROESOPHAGEAL REFLUX DISEASE, UNSPECIFIED WHETHER ESOPHAGITIS PRESENT: Primary | ICD-10-CM

## 2022-11-03 PROCEDURE — 3044F PR MOST RECENT HEMOGLOBIN A1C LEVEL <7.0%: ICD-10-PCS | Mod: CPTII,S$GLB,, | Performed by: NURSE PRACTITIONER

## 2022-11-03 PROCEDURE — 99204 PR OFFICE/OUTPT VISIT, NEW, LEVL IV, 45-59 MIN: ICD-10-PCS | Mod: S$GLB,,, | Performed by: NURSE PRACTITIONER

## 2022-11-03 PROCEDURE — 1159F MED LIST DOCD IN RCRD: CPT | Mod: CPTII,S$GLB,, | Performed by: NURSE PRACTITIONER

## 2022-11-03 PROCEDURE — 3044F HG A1C LEVEL LT 7.0%: CPT | Mod: CPTII,S$GLB,, | Performed by: NURSE PRACTITIONER

## 2022-11-03 PROCEDURE — 99999 PR PBB SHADOW E&M-EST. PATIENT-LVL IV: ICD-10-PCS | Mod: PBBFAC,,, | Performed by: NURSE PRACTITIONER

## 2022-11-03 PROCEDURE — 99204 OFFICE O/P NEW MOD 45 MIN: CPT | Mod: S$GLB,,, | Performed by: NURSE PRACTITIONER

## 2022-11-03 PROCEDURE — 1159F PR MEDICATION LIST DOCUMENTED IN MEDICAL RECORD: ICD-10-PCS | Mod: CPTII,S$GLB,, | Performed by: NURSE PRACTITIONER

## 2022-11-03 PROCEDURE — 3008F BODY MASS INDEX DOCD: CPT | Mod: CPTII,S$GLB,, | Performed by: NURSE PRACTITIONER

## 2022-11-03 PROCEDURE — 3008F PR BODY MASS INDEX (BMI) DOCUMENTED: ICD-10-PCS | Mod: CPTII,S$GLB,, | Performed by: NURSE PRACTITIONER

## 2022-11-03 PROCEDURE — 99999 PR PBB SHADOW E&M-EST. PATIENT-LVL IV: CPT | Mod: PBBFAC,,, | Performed by: NURSE PRACTITIONER

## 2022-11-03 NOTE — H&P (VIEW-ONLY)
GASTROENTEROLOGY CLINIC NOTE    Chief Complaint: The primary encounter diagnosis was Gastroesophageal reflux disease, unspecified whether esophagitis present. A diagnosis of Chronic cough was also pertinent to this visit.  Referring provider/PCP: Esthela Abdi MD    HPI:  Daya Limon is a 42 y.o. female who is a new patient to me with a PMH that's significant for chronic cough and GERD and is accompanied by her . She is here today to establish care for reflux and cough.  These are not new symptoms as she reports they have been ongoing for several years.  She takes Protonix 40mg with good relief of symptoms but does not take for longer than three months at a time.  When stops taking Protonix, symptoms return.  Reports pyrosis with eating spicy food.  Cough is most bothersome.  It is worse at night and after eating citrus foods, fruit, and sweets.  Cough wakes her at night.  She reports coughing episodes lead to wheezing and she will have to use inhaler. Avoids eating late meals, NSAIDs, and ETOH use. Caffeine use is limited to 1 cup coffee per day.     Treatments Tried: Protonix 40mg daily (helps symptoms)  NSAIDs: No  Anticoagulation or Antiplatelet: No    History of H.pylori: No  H.pylori Treatment:  Prior Upper Endoscopy: No  Prior Colonoscopy: No  Family h/o Colon Cancer: No  Family h/o Crohn's Disease or Ulcerative Colitis: No  Family h/o Celiac Sprue: No  Abdominal Surgeries: No      Review of Systems   Constitutional:  Negative for weight loss.   HENT:  Negative for sore throat.    Eyes:  Negative for blurred vision.   Respiratory:  Positive for cough.    Cardiovascular:  Negative for chest pain.   Gastrointestinal:  Positive for heartburn (occasional). Negative for abdominal pain, blood in stool, constipation, diarrhea, melena, nausea and vomiting.   Genitourinary:  Negative for dysuria.   Musculoskeletal:  Negative for myalgias.   Skin:  Negative for rash.   Neurological:  Negative for  headaches.   Endo/Heme/Allergies:  Negative for environmental allergies.   Psychiatric/Behavioral:  Negative for suicidal ideas. The patient is not nervous/anxious.      Past Medical History: has a past medical history of GERD (gastroesophageal reflux disease), Meibomian gland dysfunction (MGD), Right thyroid nodule, S/P primary low transverse , and Vitamin D deficiency.    Past Surgical History: has a past surgical history that includes Vaginal delivery and  section (N/A, 2021).    Family History:family history includes Cataracts in her father; Diabetes in her father and mother; Kidney disease in her father; Kidney failure in her father; No Known Problems in her brother, maternal aunt, maternal grandfather, maternal grandmother, maternal uncle, paternal aunt, paternal grandfather, paternal grandmother, paternal uncle, and sister.    Allergies: Review of patient's allergies indicates:  No Known Allergies    Social History: reports that she has never smoked. She has never used smokeless tobacco. She reports that she does not drink alcohol and does not use drugs.    Home medications:   Current Outpatient Medications on File Prior to Visit   Medication Sig Dispense Refill    albuterol (VENTOLIN HFA) 90 mcg/actuation inhaler Inhale 2 puffs into the lungs every 6 (six) hours as needed for Wheezing. Rescue 8.5 g 11    azelastine (ASTELIN) 137 mcg (0.1 %) nasal spray 1 spray (137 mcg total) by Nasal route 2 (two) times daily. 30 mL 11    cholecalciferol, vitamin D3, 125 mcg (5,000 unit) Tab Take 1 tablet (5,000 Units total) by mouth daily with breakfast. 90 tablet 3    dextromethorphan HBr 2.5 mg Lozg Use ase directed for cough 100 each 0    diclofenac sodium (VOLTAREN) 1 % Gel Apply 2 g topically 4 (four) times daily as needed. 100 g 11    docusate sodium (COLACE) 100 MG capsule Take 1 capsule (100 mg total) by mouth 2 (two) times daily as needed for Constipation. 30 capsule 3    fluticasone  "propionate (FLONASE) 50 mcg/actuation nasal spray 2 sprays (100 mcg total) by Each Nostril route once daily. Take daily as needed for nasal congestion 16 g 11    ibuprofen (ADVIL,MOTRIN) 600 MG tablet Take 1 tablet (600 mg total) by mouth every 6 (six) hours as needed for Pain. 30 tablet 3    jack perry ointment Apply topically 3 (three) times daily. Apply after feeding. Do not wash off. This compounded medication expires in 30 days. 30 g 1    ketoconazole (NIZORAL) 2 % shampoo Apply topically twice a week. 120 mL 11    levocetirizine (XYZAL) 5 MG tablet Take 1 tablet (5 mg total) by mouth every evening. 30 tablet 11    pantoprazole (PROTONIX) 40 MG tablet Take 1 tablet (40 mg total) by mouth before breakfast. 90 tablet 4    PNV,calcium 72-iron-folic acid (WESTAB PLUS) 27 mg iron- 1 mg Tab Take 1 tablet (1 each total) by mouth once daily. 90 tablet 4    triamcinolone acetonide 0.1% (KENALOG) 0.1 % ointment Apply topically 2 (two) times daily. 80 g 3    [DISCONTINUED] famotidine (PEPCID) 40 MG tablet Take 1 tablet (40 mg total) by mouth every evening. Take in addition to Protonix in the morning for management of acid reflux 90 tablet 4    prenatal vit 28-iron fum-folic 27 mg iron- 1 mg Tab Take 1 tablet by mouth daily with breakfast. 90 tablet 3     Current Facility-Administered Medications on File Prior to Visit   Medication Dose Route Frequency Provider Last Rate Last Admin    triamcinolone acetonide injection 80 mg  80 mg Intramuscular 1 time in Clinic/HOD Esthela Abdi MD           Vital signs:  Ht 5' 1" (1.549 m)   Wt 67.4 kg (148 lb 9.4 oz)   BMI 28.08 kg/m²     Physical Exam  Vitals reviewed.   Constitutional:       General: She is not in acute distress.     Appearance: Normal appearance. She is not ill-appearing.   HENT:      Head: Normocephalic.   Cardiovascular:      Rate and Rhythm: Normal rate and regular rhythm.      Heart sounds: Normal heart sounds. No murmur heard.  Pulmonary:      Effort: " Pulmonary effort is normal. No respiratory distress.      Breath sounds: Normal breath sounds.   Chest:      Chest wall: No tenderness.   Abdominal:      General: Bowel sounds are normal. There is no distension.      Palpations: Abdomen is soft.      Tenderness: There is no abdominal tenderness. Negative signs include Espinoza's sign.      Hernia: No hernia is present.   Skin:     General: Skin is warm.   Neurological:      Mental Status: She is alert and oriented to person, place, and time.   Psychiatric:         Mood and Affect: Mood normal.         Behavior: Behavior normal.       Routine labs:  Lab Results   Component Value Date    WBC 6.96 10/22/2022    HGB 13.9 10/22/2022    HCT 43.0 10/22/2022    MCV 86 10/22/2022     10/22/2022     No results found for: INR  Lab Results   Component Value Date    IRON 74 10/22/2022    FERRITIN 76 10/22/2022    TIBC 426 10/22/2022    FESATURATED 17 (L) 10/22/2022     Lab Results   Component Value Date     10/22/2022    K 4.1 10/22/2022     10/22/2022    CO2 24 10/22/2022    BUN 12 10/22/2022    CREATININE 0.6 10/22/2022     Lab Results   Component Value Date    ALBUMIN 4.1 10/22/2022    ALT 26 10/22/2022    AST 26 10/22/2022    ALKPHOS 87 10/22/2022    BILITOT 0.5 10/22/2022     No results found for: GLUCOSE  Lab Results   Component Value Date    TSH 4.127 (H) 10/22/2022     Lab Results   Component Value Date    CALCIUM 9.3 10/22/2022       Imaging:      I have reviewed prior labs, imaging, and notes.      Assessment:  1. Gastroesophageal reflux disease, unspecified whether esophagitis present    2. Chronic cough        Plan:  Orders Placed This Encounter    Case Request Endoscopy: EGD (ESOPHAGOGASTRODUODENOSCOPY)     EGD. Consider biopsy for H.pylori  Restart Protonix 40mg daily. Continue for Protonix for 3-4 months then consider decreasing to 20mg daily. If tolerates, 20mg daily for 3-4 months, then consider discontinuing as patient prefers to not take  medication.     Consider ENT and/or Pulmonology referral for cough pending workup and patient response.     Plan of care discussed with patient who is in agreement and verbalized understanding.     I have explained the planned procedures to the patient.The risks, benefits and alternatives of the procedure were also explained in detail. Patient verbalized understanding, all questions were answered. The patient agrees to proceed as planned    Follow Up: Pending Above Workup        SHAYNE Balderas,FNP-BC  [facility] [place]    (Portions of this note were dictated using voice recognition software and may contain dictation related errors in spelling/grammar/syntax not found on text review)

## 2022-11-03 NOTE — PATIENT INSTRUCTIONS
EGD Instructions    [facility] [address]  Clinic Office [phone]  Endoscopy Lab [phone]    You are scheduled for an EGD with Dr. Fritz  on  11/25/2022 at [facility] in [place].    Check in at the Hospital -1st floor, Information desk.   Call [phone] to reschedule.    You cannot have anything to eat or drink after Midnight. You can brush your teeth with a sip of water.     An adult friend/family member must come with you to drive you home.  You cannot drive, take a taxi, Uber/Lyft or bus to leave the Endoscopy Center alone.  If you do not have someone to drive you home, your test will be cancelled.     Please follow the directions of your doctor if you take any pills that thin your blood. If you take these meds: Aggrenox, Brilinta, Effient, Eliquis, Lovenox, Plavix, Pletal, Pradaxa, Ticilid, Xarelto or Coumadin, let the doctor's office know.    DON'T: On the morning of the test do not take insulin or pills for diabetes.     DO: On the morning of the test, do take any pills for blood pressure, heart, anti-rejection and or seizures with a small sip of water. Bring any inhalers with you.    A Covid test is required 72 hours before the procedure. The test is not needed with proof of vaccination > 2 weeks or previous Covid infection.    Leave all valuables and jewelry at home. You will be at the hospital for 2-4 hours.    Call the Endoscopy department at [phone] with any questions about your procedure.    Thank you for choosing [facility].

## 2022-11-03 NOTE — PROGRESS NOTES
GASTROENTEROLOGY CLINIC NOTE    Chief Complaint: The primary encounter diagnosis was Gastroesophageal reflux disease, unspecified whether esophagitis present. A diagnosis of Chronic cough was also pertinent to this visit.  Referring provider/PCP: Esthela Abdi MD    HPI:  Daya Limon is a 42 y.o. female who is a new patient to me with a PMH that's significant for chronic cough and GERD and is accompanied by her . She is here today to establish care for reflux and cough.  These are not new symptoms as she reports they have been ongoing for several years.  She takes Protonix 40mg with good relief of symptoms but does not take for longer than three months at a time.  When stops taking Protonix, symptoms return.  Reports pyrosis with eating spicy food.  Cough is most bothersome.  It is worse at night and after eating citrus foods, fruit, and sweets.  Cough wakes her at night.  She reports coughing episodes lead to wheezing and she will have to use inhaler. Avoids eating late meals, NSAIDs, and ETOH use. Caffeine use is limited to 1 cup coffee per day.     Treatments Tried: Protonix 40mg daily (helps symptoms)  NSAIDs: No  Anticoagulation or Antiplatelet: No    History of H.pylori: No  H.pylori Treatment:  Prior Upper Endoscopy: No  Prior Colonoscopy: No  Family h/o Colon Cancer: No  Family h/o Crohn's Disease or Ulcerative Colitis: No  Family h/o Celiac Sprue: No  Abdominal Surgeries: No      Review of Systems   Constitutional:  Negative for weight loss.   HENT:  Negative for sore throat.    Eyes:  Negative for blurred vision.   Respiratory:  Positive for cough.    Cardiovascular:  Negative for chest pain.   Gastrointestinal:  Positive for heartburn (occasional). Negative for abdominal pain, blood in stool, constipation, diarrhea, melena, nausea and vomiting.   Genitourinary:  Negative for dysuria.   Musculoskeletal:  Negative for myalgias.   Skin:  Negative for rash.   Neurological:  Negative for  headaches.   Endo/Heme/Allergies:  Negative for environmental allergies.   Psychiatric/Behavioral:  Negative for suicidal ideas. The patient is not nervous/anxious.      Past Medical History: has a past medical history of GERD (gastroesophageal reflux disease), Meibomian gland dysfunction (MGD), Right thyroid nodule, S/P primary low transverse , and Vitamin D deficiency.    Past Surgical History: has a past surgical history that includes Vaginal delivery and  section (N/A, 2021).    Family History:family history includes Cataracts in her father; Diabetes in her father and mother; Kidney disease in her father; Kidney failure in her father; No Known Problems in her brother, maternal aunt, maternal grandfather, maternal grandmother, maternal uncle, paternal aunt, paternal grandfather, paternal grandmother, paternal uncle, and sister.    Allergies: Review of patient's allergies indicates:  No Known Allergies    Social History: reports that she has never smoked. She has never used smokeless tobacco. She reports that she does not drink alcohol and does not use drugs.    Home medications:   Current Outpatient Medications on File Prior to Visit   Medication Sig Dispense Refill    albuterol (VENTOLIN HFA) 90 mcg/actuation inhaler Inhale 2 puffs into the lungs every 6 (six) hours as needed for Wheezing. Rescue 8.5 g 11    azelastine (ASTELIN) 137 mcg (0.1 %) nasal spray 1 spray (137 mcg total) by Nasal route 2 (two) times daily. 30 mL 11    cholecalciferol, vitamin D3, 125 mcg (5,000 unit) Tab Take 1 tablet (5,000 Units total) by mouth daily with breakfast. 90 tablet 3    dextromethorphan HBr 2.5 mg Lozg Use ase directed for cough 100 each 0    diclofenac sodium (VOLTAREN) 1 % Gel Apply 2 g topically 4 (four) times daily as needed. 100 g 11    docusate sodium (COLACE) 100 MG capsule Take 1 capsule (100 mg total) by mouth 2 (two) times daily as needed for Constipation. 30 capsule 3    fluticasone  "propionate (FLONASE) 50 mcg/actuation nasal spray 2 sprays (100 mcg total) by Each Nostril route once daily. Take daily as needed for nasal congestion 16 g 11    ibuprofen (ADVIL,MOTRIN) 600 MG tablet Take 1 tablet (600 mg total) by mouth every 6 (six) hours as needed for Pain. 30 tablet 3    jack perry ointment Apply topically 3 (three) times daily. Apply after feeding. Do not wash off. This compounded medication expires in 30 days. 30 g 1    ketoconazole (NIZORAL) 2 % shampoo Apply topically twice a week. 120 mL 11    levocetirizine (XYZAL) 5 MG tablet Take 1 tablet (5 mg total) by mouth every evening. 30 tablet 11    pantoprazole (PROTONIX) 40 MG tablet Take 1 tablet (40 mg total) by mouth before breakfast. 90 tablet 4    PNV,calcium 72-iron-folic acid (WESTAB PLUS) 27 mg iron- 1 mg Tab Take 1 tablet (1 each total) by mouth once daily. 90 tablet 4    triamcinolone acetonide 0.1% (KENALOG) 0.1 % ointment Apply topically 2 (two) times daily. 80 g 3    [DISCONTINUED] famotidine (PEPCID) 40 MG tablet Take 1 tablet (40 mg total) by mouth every evening. Take in addition to Protonix in the morning for management of acid reflux 90 tablet 4    prenatal vit 28-iron fum-folic 27 mg iron- 1 mg Tab Take 1 tablet by mouth daily with breakfast. 90 tablet 3     Current Facility-Administered Medications on File Prior to Visit   Medication Dose Route Frequency Provider Last Rate Last Admin    triamcinolone acetonide injection 80 mg  80 mg Intramuscular 1 time in Clinic/HOD Estehla Abdi MD           Vital signs:  Ht 5' 1" (1.549 m)   Wt 67.4 kg (148 lb 9.4 oz)   BMI 28.08 kg/m²     Physical Exam  Vitals reviewed.   Constitutional:       General: She is not in acute distress.     Appearance: Normal appearance. She is not ill-appearing.   HENT:      Head: Normocephalic.   Cardiovascular:      Rate and Rhythm: Normal rate and regular rhythm.      Heart sounds: Normal heart sounds. No murmur heard.  Pulmonary:      Effort: " Pulmonary effort is normal. No respiratory distress.      Breath sounds: Normal breath sounds.   Chest:      Chest wall: No tenderness.   Abdominal:      General: Bowel sounds are normal. There is no distension.      Palpations: Abdomen is soft.      Tenderness: There is no abdominal tenderness. Negative signs include Espinoza's sign.      Hernia: No hernia is present.   Skin:     General: Skin is warm.   Neurological:      Mental Status: She is alert and oriented to person, place, and time.   Psychiatric:         Mood and Affect: Mood normal.         Behavior: Behavior normal.       Routine labs:  Lab Results   Component Value Date    WBC 6.96 10/22/2022    HGB 13.9 10/22/2022    HCT 43.0 10/22/2022    MCV 86 10/22/2022     10/22/2022     No results found for: INR  Lab Results   Component Value Date    IRON 74 10/22/2022    FERRITIN 76 10/22/2022    TIBC 426 10/22/2022    FESATURATED 17 (L) 10/22/2022     Lab Results   Component Value Date     10/22/2022    K 4.1 10/22/2022     10/22/2022    CO2 24 10/22/2022    BUN 12 10/22/2022    CREATININE 0.6 10/22/2022     Lab Results   Component Value Date    ALBUMIN 4.1 10/22/2022    ALT 26 10/22/2022    AST 26 10/22/2022    ALKPHOS 87 10/22/2022    BILITOT 0.5 10/22/2022     No results found for: GLUCOSE  Lab Results   Component Value Date    TSH 4.127 (H) 10/22/2022     Lab Results   Component Value Date    CALCIUM 9.3 10/22/2022       Imaging:      I have reviewed prior labs, imaging, and notes.      Assessment:  1. Gastroesophageal reflux disease, unspecified whether esophagitis present    2. Chronic cough        Plan:  Orders Placed This Encounter    Case Request Endoscopy: EGD (ESOPHAGOGASTRODUODENOSCOPY)     EGD. Consider biopsy for H.pylori  Restart Protonix 40mg daily. Continue for Protonix for 3-4 months then consider decreasing to 20mg daily. If tolerates, 20mg daily for 3-4 months, then consider discontinuing as patient prefers to not take  medication.     Consider ENT and/or Pulmonology referral for cough pending workup and patient response.     Plan of care discussed with patient who is in agreement and verbalized understanding.     I have explained the planned procedures to the patient.The risks, benefits and alternatives of the procedure were also explained in detail. Patient verbalized understanding, all questions were answered. The patient agrees to proceed as planned    Follow Up: Pending Above Workup        SHAYNE Balderas,FNP-BC  [facility]/[place]    (Portions of this note were dictated using voice recognition software and may contain dictation related errors in spelling/grammar/syntax not found on text review)

## 2022-11-06 NOTE — PROGRESS NOTES
Subjective:       Patient ID: Daya Limon is a 42 y.o. female.    Chief Complaint:  No chief complaint on file.  Questions about food allergy    HPI    Daya Limon is a 42 y.o. female with GERD and allergic rhinitis presenting for postprandial cough.      Mrs. Limon reports a 5 year history of cough occurring ~2 hours after eating certain foods, which has gotten worse in the last 2 years, sometimes progressing to wheezing and shortness of breath requiring albuterol. Albuterol seems to provide relief. She reports that these symptoms can occur after any meal, but are more common (80% of episodes) at night. Triggering foods include milk, sweets, and all fruits, especially citrus. The cough occurs most evenings and she requires albuterol 1-2 nights/week. It has started to interfere with sleep. She denies associated facial/oral swelling, immediate wheezing, urticaria, rash, vomiting, dizziness, and oral tingling/irritation.   She saw Dr. Camargo for this issue on 04/06/21 and underwent thorough allergy testing, for which all food allergies were negative. Of note, she has a history of GERD on pantoprazole 40mg QD.      For her allergic rhinitis symptoms, she reports rhinorrhea, sinus pressure, and occasionally itchy, watery eyes. She takes nightly xyzal, which she reports provides relief. If she forgets to take it, she experiences sneezing and post-nasal drip with sore throat, which resolves once she takes her medication. She has tried Flonase irregularly and has never tried Astelin. She reports worsening of rhinitis symptoms when the weather changes, but cannot pinpoint any seasonality.      She denies frequent infections, particularly those requiring antibiotics. She does not have a history of eczema. No known venom or drug allergies.   She is a lifetime nonsmoker and there are no smokers in her household. There are no pets or carpeted floors in the home.         Past Medical History:   Diagnosis Date    GERD  (gastroesophageal reflux disease) 2017    Meibomian gland dysfunction (MGD)     Right thyroid nodule 10/20/2019    S/P (-) FNA 19 per endocrine dr bass     S/P primary low transverse  2021    Vitamin D deficiency 2017       Family History   Problem Relation Age of Onset    Diabetes Mother     Diabetes Father     Kidney disease Father     Kidney failure Father     Cataracts Father     No Known Problems Sister     No Known Problems Brother     No Known Problems Maternal Aunt     No Known Problems Maternal Uncle     No Known Problems Paternal Aunt     No Known Problems Paternal Uncle     No Known Problems Maternal Grandmother     No Known Problems Maternal Grandfather     No Known Problems Paternal Grandmother     No Known Problems Paternal Grandfather     Breast cancer Neg Hx     Colon cancer Neg Hx     Ovarian cancer Neg Hx     Hypertension Neg Hx     Stroke Neg Hx     Amblyopia Neg Hx     Blindness Neg Hx     Cancer Neg Hx     Glaucoma Neg Hx     Macular degeneration Neg Hx     Retinal detachment Neg Hx     Strabismus Neg Hx     Thyroid disease Neg Hx     Thyroid nodules Neg Hx     Thyroid cancer Neg Hx          Review of Systems   Constitutional:  Negative for chills and fever.   HENT:  Negative for congestion, nosebleeds and sinus pain.    Eyes:  Negative for pain and redness.   Respiratory:  Positive for cough. Negative for shortness of breath.    Cardiovascular:  Negative for chest pain.   Gastrointestinal:  Negative for abdominal pain, constipation, diarrhea and nausea.   Musculoskeletal:  Negative for myalgias.   Skin:  Negative for itching and rash.   Neurological:  Negative for seizures and weakness.   Psychiatric/Behavioral:  The patient is not nervous/anxious.     Review of Systems   Constitutional:  Negative for chills and fever.   HENT:  Negative for congestion, nosebleeds and sinus pain.    Eyes:  Negative for pain and redness.   Respiratory:  Positive for cough. Negative  for shortness of breath.    Cardiovascular:  Negative for chest pain.   Gastrointestinal:  Negative for abdominal pain, constipation, diarrhea and nausea.   Musculoskeletal:  Negative for myalgias.   Skin:  Negative for itching and rash.   Neurological:  Negative for seizures and weakness.   Psychiatric/Behavioral:  The patient is not nervous/anxious.      Objective:   Physical Exam      No results found for: IGGSERUM, IGM, IGA, IGE  Eos #   Date Value Ref Range Status   10/22/2022 0.3 0.0 - 0.5 K/uL Final   11/03/2021 0.0 0.0 - 0.5 K/uL Final   11/02/2021 0.1 0.0 - 0.5 K/uL Final     Eosinophil %   Date Value Ref Range Status   10/22/2022 4.7 0.0 - 8.0 % Final   11/03/2021 0.1 0.0 - 8.0 % Final   11/02/2021 0.8 0.0 - 8.0 % Final      Latest Reference Range & Units Most Recent   A. fumigatus Class  CLASS 0  4/6/21 09:13   ALLERGEN  PEAR IGE <0.10 kU/L <0.10  4/6/21 09:13   Allergen Grapefruit IgE <0.10 kU/L <0.10  4/6/21 09:13   ALLERGEN PINEAPPLE IGE <0.10 kU/L <0.10  4/6/21 09:13   ALLERGEN STRAWBERRY IGE <0.10 kU/L <0.10  4/6/21 09:13   ALLERGEN SUNFLOWER SEED IGE <0.10 kU/L <0.10  4/6/21 09:13   ALLREGEN WATERMELON IGE <0.10 kU/L <0.10  4/6/21 09:13   Altern. alternata Class  CLASS 0  4/6/21 09:13   Alternaria alternata <0.10 kU/L <0.10  4/6/21 09:13   Apple <0.10 kU/L <0.10  4/6/21 09:13   Apple Class  CLASS 0  4/6/21 09:13   Apricot <0.10 kU/L <0.10  4/6/21 09:13   Apricot Class  CLASS 0  4/6/21 09:13   Aspergillus Fumigatus IgE <0.10 kU/L <0.10  4/6/21 09:13   [place]ia Class  CLASS 0  4/6/21 09:13   BAHIA GRASS <0.10 kU/L <0.10  4/6/21 09:13   [place] Class  CLASS 0  4/6/21 09:13   Banana <0.10 kU/L <0.10  4/6/21 09:13   Banana Class  CLASS 0  4/6/21 09:13   BERMUDA GRASS <0.10 kU/L <0.10  4/6/21 09:13   [place] Grass Class  CLASS 0  4/6/21 09:13   Cat Dander <0.10 kU/L <0.10  4/6/21 09:13   Cat Epithelium Class  CLASS 0  4/6/21 09:13   Chaetomium <0.10 kU/L <0.10  4/6/21 09:13   Chaetomium Glob. Class   CLASS 0  4/6/21 09:13   Cladosporium Class  CLASS 0  4/6/21 09:13   Cladosporium, IgE <0.10 kU/L <0.10  4/6/21 09:13   Cockroach, IgE <0.10 kU/L  -  0.23 (H)  4/6/21 09:13  CLASS 0/1  4/6/21 09:13   [place] Class  CLASS 0  4/6/21 09:13   [place] IgE <0.10 kU/L <0.10  4/6/21 09:13   Cow's Milk Class  CLASS 0  4/6/21 09:13   Curvularia lunata <0.10 kU/L <0.10  4/6/21 09:13   Curvularia Lunata Class  CLASS 0  4/6/21 09:13   [place] <0.10 kU/L <0.10  4/6/21 09:13   D. farinae <0.10 kU/L 0.33 (H)  4/6/21 09:13   D. farinae Class  CLASS 0/1  4/6/21 09:13   D. pteronyssinus Class  CLASS 1  4/6/21 09:13   Dog Dander, IgE <0.10 kU/L <0.10  4/6/21 09:13   Dog Dander Class  CLASS 0  4/6/21 09:13   Egg White <0.10 kU/L <0.10  4/6/21 09:13   Egg White Class  CLASS 0  4/6/21 09:13   English Plantain Class  CLASS 0  4/6/21 09:13   Grapefruit Class  CLASS 0  4/6/21 09:13   Helminthosporium Class  CLASS 0  4/6/21 09:13   Helminthosporium Halodes IgE <0.10 kU/L <0.10  4/6/21 09:13   Horse <0.10 kU/L <0.10  4/6/21 09:13   Horse Dander Class  CLASS 0  4/6/21 09:13   REBEKAH GRASS <0.10 kU/L <0.10  4/6/21 09:13   Rebekah Grass Class  CLASS 0  4/6/21 09:13   Lemon <0.10 kU/L <0.10  4/6/21 09:13   Lemon Class  CLASS 0  4/6/21 09:13   Lime <0.10 kU/L <0.10  4/6/21 09:13   Lime Class  CLASS 0  4/6/21 09:13   Rajiv <0.10 kU/L <0.10  4/6/21 09:13   Rajiv Class  CLASS 0  4/6/21 09:13   Maple/[place] Class  CLASS 0  4/6/21 09:13   Maple/[place] IgE <0.10 kU/L <0.10  4/6/21 09:13   Marshelder Class  CLASS 0  4/6/21 09:13   Marshelder IgE <0.10 kU/L <0.10  4/6/21 09:13   Melon Class  CLASS 0  4/6/21 09:13   Melon IgE <0.10 kU/L <0.10  4/6/21 09:13   Milk IgE <0.10 kU/L <0.10  4/6/21 09:13   Mite Dust Pteronyssinus IgE <0.10 kU/L 0.40 (H)  4/6/21 09:13   MUGWORT <0.10 kU/L <0.10  4/6/21 09:13   Mugwort Class  CLASS 0  4/6/21 09:13   Oak, Class  CLASS 0  4/6/21 09:13   Oat <0.10 kU/L <0.10  4/6/21 09:13   Oat Class  CLASS 0  4/6/21 09:13    Orange <0.10 kU/L <0.10  4/6/21 09:13   Meta Class  CLASS 0  4/6/21 09:13   Peach <0.10 kU/L <0.10  4/6/21 09:13   [place] Class  CLASS 0  4/6/21 09:13   Peanut Class  CLASS 0  4/6/21 09:13   Allergen Peanut IgE <0.10 kU/L <0.10  4/6/21 09:13   Pear Class  CLASS 0  4/6/21 09:13   Pecan (Food) Class  CLASS 0  4/6/21 09:13   Pecan [place] Tree <0.10 kU/L <0.10  4/6/21 09:13   Pecan Nut <0.10 kU/L <0.10  4/6/21 09:13   Pecan, Class  CLASS 0  4/6/21 09:13   Penicillium Class  CLASS 0  4/6/21 09:13   Penicillium, IgE <0.10 kU/L <0.10  4/6/21 09:13   Pineapple Class  CLASS 0  4/6/21 09:13   Plantain <0.10 kU/L <0.10  4/6/21 09:13   Ragweed, Short, Class  CLASS 0  4/6/21 09:13   Ragweed, Short, IgE <0.10 kU/L <0.10  4/6/21 09:13   Ragweed, Western IgE <0.10 kU/L 0.14 (H)  4/6/21 09:13   Ragweed, Western, Class  CLASS 0/1  4/6/21 09:13   Russian Thistle Class  CLASS 0  4/6/21 09:13   Russian Thistle IgE <0.10 kU/L <0.10  4/6/21 09:13   Sesame Seed Class  CLASS 0  4/6/21 09:13   Sesame Seed, IgE <0.10 kU/L <0.10  4/6/21 09:13   Silver Birch Class  CLASS 0  4/6/21 09:13   [place]ch IgE <0.10 kU/L <0.10  4/6/21 09:13   Soybean Class  CLASS 0  4/6/21 09:13   Soybean IgE <0.10 kU/L <0.10  4/6/21 09:13   [place] Class  CLASS 0  4/6/21 09:13   [place] Seed Class  CLASS 0  4/6/21 09:13   Steve Grass <0.10 kU/L <0.10  4/6/21 09:13   Steve Grass Class  CLASS 0  4/6/21 09:13   Tomato Class  CLASS 0  4/6/21 09:13   Tomato IgE <0.10 kU/L <0.10  4/6/21 09:13   [place] Tree Class  CLASS 0  4/6/21 09:13   [place] Tree, IgE <0.10 kU/L <0.10  4/6/21 09:13   Watermelon Class  CLASS 0  4/6/21 09:13   Wheat Class  CLASS 0  4/6/21 09:13   Wheat IgE <0.10 kU/L <0.10  4/6/21 09:13   White Oak(Quercus alba) IgE <0.10 kU/L <0.10  4/6/21 09:13   White Pine Class  CLASS 0  4/6/21 09:13   [place], IgE <0.10 kU/L <0.10  4/6/21 09:13   [place] Class  CLASS 0  4/6/21 09:13   [place], IgE <0.10 kU/L <0.10  4/6/21 09:13   (H): Data is  abnormally high      Assessment:       1. Gastroesophageal reflux disease, unspecified whether esophagitis present    2. Cough, unspecified type    3. Seasonal allergies         Plan:          Assessment and Plan:  42 y.o. female with GERD and allergic rhinitis presenting for postprandial cough  Cough occurs 2 hours after eating and patient is not experiencing any assoc sx's suspicious of IGE mediated food allergy  Hx more c/w GERD than food allergy. Highly unlikely that she would be allergic to all reported triggering foods.     Gastroesophageal reflux disease, unspecified whether esophagitis present  - Patient is scheduled for Gastroenterology visit on 11/03  - Continue taking pantoprazole 40mg QD; discuss with GI before increasing to BID  - Discussed relationship between GERD and cough/wheezing, as well as lifestyle modifications     Seasonal allergies  - Continue taking xzyal for control of allergic rhinitis symptoms  - Consider adding Flonase 1-2 squirts / nostril QD if symptoms persist

## 2022-11-10 ENCOUNTER — HOSPITAL ENCOUNTER (OUTPATIENT)
Dept: RADIOLOGY | Facility: HOSPITAL | Age: 42
Discharge: HOME | End: 2022-11-10
Attending: FAMILY MEDICINE
Payer: COMMERCIAL

## 2022-11-10 DIAGNOSIS — Z12.31 SCREENING MAMMOGRAM FOR BREAST CANCER: ICD-10-CM

## 2022-11-10 PROCEDURE — 77063 MAMMO DIGITAL SCREENING BILAT WITH TOMO: ICD-10-PCS | Mod: 26,,, | Performed by: RADIOLOGY

## 2022-11-10 PROCEDURE — 77063 BREAST TOMOSYNTHESIS BI: CPT | Mod: TC

## 2022-11-10 PROCEDURE — 77063 BREAST TOMOSYNTHESIS BI: CPT | Mod: 26,,, | Performed by: RADIOLOGY

## 2022-11-10 PROCEDURE — 77067 SCR MAMMO BI INCL CAD: CPT | Mod: TC

## 2022-11-10 PROCEDURE — 77067 MAMMO DIGITAL SCREENING BILAT WITH TOMO: ICD-10-PCS | Mod: 26,,, | Performed by: RADIOLOGY

## 2022-11-10 PROCEDURE — 77067 SCR MAMMO BI INCL CAD: CPT | Mod: 26,,, | Performed by: RADIOLOGY

## 2022-11-21 ENCOUNTER — TELEPHONE (OUTPATIENT)
Dept: ENDOSCOPY | Facility: HOSPITAL | Age: 42
End: 2022-11-21
Payer: COMMERCIAL

## 2022-11-21 NOTE — TELEPHONE ENCOUNTER
Left messages instructing patient to call dept @ [phone] between 8am-3pm.    Arrival time to be given @ 1000  EGD  Covid - vacc  (Message sent via [facility] portal)

## 2022-11-22 ENCOUNTER — TELEPHONE (OUTPATIENT)
Dept: ENDOSCOPY | Facility: HOSPITAL | Age: 42
End: 2022-11-22
Payer: COMMERCIAL

## 2022-11-22 NOTE — TELEPHONE ENCOUNTER
Spoke with patient about arrival time @ 1000.       NPO status reviewed: Patient must have nothing to eat after midnight.      Medications: Do not take Insulin or oral diabetic medications the day of the procedure.  Take as prescribed: heart, seizure and blood pressure medication in the morning with a sip of water (less than an ounce).  Take any breathing medications and bring inhalers to hospital with you Leave all valuables and jewelry at home.     Wear comfortable clothes to procedure to change into hospital gown You cannot drive for 24 hours after your procedure because you will receive sedation for your procedure to make you comfortable.  A ride must be provided at discharge.

## 2022-11-22 NOTE — TELEPHONE ENCOUNTER
Left messages instructing patient to call dept @ [phone] by 3pm today.    Arrival time to be given @ 1000  EGD  Covid - vacc  (Message sent via [facility] portal 11/21 read)

## 2022-11-25 ENCOUNTER — ANESTHESIA EVENT (OUTPATIENT)
Dept: ENDOSCOPY | Facility: HOSPITAL | Age: 42
End: 2022-11-25
Payer: COMMERCIAL

## 2022-11-25 ENCOUNTER — HOSPITAL ENCOUNTER (OUTPATIENT)
Facility: HOSPITAL | Age: 42
Discharge: HOME | End: 2022-11-25
Attending: INTERNAL MEDICINE | Admitting: INTERNAL MEDICINE
Payer: COMMERCIAL

## 2022-11-25 ENCOUNTER — ANESTHESIA (OUTPATIENT)
Dept: ENDOSCOPY | Facility: HOSPITAL | Age: 42
End: 2022-11-25
Payer: COMMERCIAL

## 2022-11-25 VITALS
TEMPERATURE: 98 F | WEIGHT: 152 LBS | HEART RATE: 76 BPM | BODY MASS INDEX: 28.7 KG/M2 | SYSTOLIC BLOOD PRESSURE: 117 MMHG | OXYGEN SATURATION: 100 % | RESPIRATION RATE: 20 BRPM | DIASTOLIC BLOOD PRESSURE: 62 MMHG | HEIGHT: 61 IN

## 2022-11-25 DIAGNOSIS — K21.9 GERD (GASTROESOPHAGEAL REFLUX DISEASE): ICD-10-CM

## 2022-11-25 LAB
B-HCG UR QL: NEGATIVE
CTP QC/QA: YES

## 2022-11-25 PROCEDURE — 88342 CHG IMMUNOCYTOCHEMISTRY: ICD-10-PCS | Mod: 26,,, | Performed by: PATHOLOGY

## 2022-11-25 PROCEDURE — 63600175 PHARM REV CODE 636 W HCPCS: Performed by: NURSE ANESTHETIST, CERTIFIED REGISTERED

## 2022-11-25 PROCEDURE — 88342 IMHCHEM/IMCYTCHM 1ST ANTB: CPT | Performed by: PATHOLOGY

## 2022-11-25 PROCEDURE — 43239 EGD BIOPSY SINGLE/MULTIPLE: CPT | Performed by: INTERNAL MEDICINE

## 2022-11-25 PROCEDURE — 81025 URINE PREGNANCY TEST: CPT | Performed by: INTERNAL MEDICINE

## 2022-11-25 PROCEDURE — 27201012 HC FORCEPS, HOT/COLD, DISP: Performed by: INTERNAL MEDICINE

## 2022-11-25 PROCEDURE — 43239 PR EGD, FLEX, W/BIOPSY, SGL/MULTI: ICD-10-PCS | Mod: ,,, | Performed by: INTERNAL MEDICINE

## 2022-11-25 PROCEDURE — D9220A PRA ANESTHESIA: ICD-10-PCS | Mod: CRNA,,, | Performed by: NURSE ANESTHETIST, CERTIFIED REGISTERED

## 2022-11-25 PROCEDURE — D9220A PRA ANESTHESIA: ICD-10-PCS | Mod: ANES,,, | Performed by: ANESTHESIOLOGY

## 2022-11-25 PROCEDURE — D9220A PRA ANESTHESIA: Mod: CRNA,,, | Performed by: NURSE ANESTHETIST, CERTIFIED REGISTERED

## 2022-11-25 PROCEDURE — 25000003 PHARM REV CODE 250: Performed by: NURSE ANESTHETIST, CERTIFIED REGISTERED

## 2022-11-25 PROCEDURE — 43239 EGD BIOPSY SINGLE/MULTIPLE: CPT | Mod: ,,, | Performed by: INTERNAL MEDICINE

## 2022-11-25 PROCEDURE — 88305 TISSUE EXAM BY PATHOLOGIST: CPT | Mod: 26,,, | Performed by: PATHOLOGY

## 2022-11-25 PROCEDURE — 88305 TISSUE EXAM BY PATHOLOGIST: ICD-10-PCS | Mod: 26,,, | Performed by: PATHOLOGY

## 2022-11-25 PROCEDURE — 88342 IMHCHEM/IMCYTCHM 1ST ANTB: CPT | Mod: 26,,, | Performed by: PATHOLOGY

## 2022-11-25 PROCEDURE — 88305 TISSUE EXAM BY PATHOLOGIST: CPT | Performed by: PATHOLOGY

## 2022-11-25 PROCEDURE — D9220A PRA ANESTHESIA: Mod: ANES,,, | Performed by: ANESTHESIOLOGY

## 2022-11-25 PROCEDURE — 37000009 HC ANESTHESIA EA ADD 15 MINS: Performed by: INTERNAL MEDICINE

## 2022-11-25 PROCEDURE — 37000008 HC ANESTHESIA 1ST 15 MINUTES: Performed by: INTERNAL MEDICINE

## 2022-11-25 PROCEDURE — 25000003 PHARM REV CODE 250: Performed by: INTERNAL MEDICINE

## 2022-11-25 RX ORDER — SODIUM CHLORIDE 0.9 % (FLUSH) 0.9 %
10 SYRINGE (ML) INJECTION
Status: CANCELLED | OUTPATIENT
Start: 2022-11-25

## 2022-11-25 RX ORDER — SODIUM CHLORIDE 9 MG/ML
INJECTION, SOLUTION INTRAVENOUS CONTINUOUS
Status: DISCONTINUED | OUTPATIENT
Start: 2022-11-25 | End: 2022-11-25 | Stop reason: HOSPADM

## 2022-11-25 RX ORDER — SODIUM CHLORIDE 0.9 % (FLUSH) 0.9 %
10 SYRINGE (ML) INJECTION
Status: DISCONTINUED | OUTPATIENT
Start: 2022-11-25 | End: 2022-11-25 | Stop reason: HOSPADM

## 2022-11-25 RX ORDER — LIDOCAINE HCL/PF 100 MG/5ML
SYRINGE (ML) INTRAVENOUS
Status: DISCONTINUED | OUTPATIENT
Start: 2022-11-25 | End: 2022-11-25

## 2022-11-25 RX ORDER — PROPOFOL 10 MG/ML
VIAL (ML) INTRAVENOUS CONTINUOUS PRN
Status: DISCONTINUED | OUTPATIENT
Start: 2022-11-25 | End: 2022-11-25

## 2022-11-25 RX ORDER — PROPOFOL 10 MG/ML
VIAL (ML) INTRAVENOUS
Status: DISCONTINUED | OUTPATIENT
Start: 2022-11-25 | End: 2022-11-25

## 2022-11-25 RX ORDER — FENTANYL CITRATE 50 UG/ML
25 INJECTION, SOLUTION INTRAMUSCULAR; INTRAVENOUS EVERY 5 MIN PRN
Status: CANCELLED | OUTPATIENT
Start: 2022-11-25

## 2022-11-25 RX ORDER — OXYCODONE HYDROCHLORIDE 5 MG/1
5 TABLET ORAL
Status: CANCELLED | OUTPATIENT
Start: 2022-11-25

## 2022-11-25 RX ADMIN — PROPOFOL 50 MG: 10 INJECTION, EMULSION INTRAVENOUS at 11:11

## 2022-11-25 RX ADMIN — LIDOCAINE HYDROCHLORIDE 50 MG: 20 INJECTION, SOLUTION INTRAVENOUS at 11:11

## 2022-11-25 RX ADMIN — PROPOFOL 150 MCG/KG/MIN: 10 INJECTION, EMULSION INTRAVENOUS at 11:11

## 2022-11-25 RX ADMIN — SODIUM CHLORIDE: 0.9 INJECTION, SOLUTION INTRAVENOUS at 10:11

## 2022-11-25 RX ADMIN — GLYCOPYRROLATE 0.2 MG: 0.2 INJECTION, SOLUTION INTRAMUSCULAR; INTRAVITREAL at 11:11

## 2022-11-25 RX ADMIN — TOPICAL ANESTHETIC 1 EACH: 200 SPRAY DENTAL; PERIODONTAL at 11:11

## 2022-11-25 NOTE — TRANSFER OF CARE
"Anesthesia Transfer of Care Note    Patient: Daya Limon    Procedure(s) Performed: Procedure(s) (LRB):  EGD (ESOPHAGOGASTRODUODENOSCOPY) (N/A)    Patient location: GI    Anesthesia Type: general    Transport from OR: Transported from OR on room air with adequate spontaneous ventilation    Post pain: adequate analgesia    Post assessment: no apparent anesthetic complications and tolerated procedure well    Post vital signs: stable    Level of consciousness: awake, alert and oriented    Nausea/Vomiting: no nausea/vomiting    Complications: none    Transfer of care protocol was followed      Last vitals:   Visit Vitals  /72 (BP Location: Left arm, Patient Position: Lying)   Pulse 77   Temp 37.3 °C (99.1 °F) (Temporal)   Resp 17   Ht 5' 1" (1.549 m)   Wt 68.9 kg (152 lb)   SpO2 100%   Breastfeeding No   BMI 28.72 kg/m²     "

## 2022-11-25 NOTE — ANESTHESIA POSTPROCEDURE EVALUATION
Anesthesia Post Evaluation    Patient: Daya Limon    Procedure(s) Performed: Procedure(s) (LRB):  EGD (ESOPHAGOGASTRODUODENOSCOPY) (N/A)    Final Anesthesia Type: general      Patient location during evaluation: PACU  Patient participation: Yes- Able to Participate  Level of consciousness: awake and alert and oriented  Post-procedure vital signs: reviewed and stable  Pain management: adequate  Airway patency: patent    PONV status at discharge: No PONV  Anesthetic complications: no      Cardiovascular status: hemodynamically stable  Respiratory status: nasal cannula  Hydration status: euvolemic  Follow-up not needed.          Vitals Value Taken Time   /62 11/25/22 1203   Temp 36.7 °C (98 °F) 11/25/22 1143   Pulse 76 11/25/22 1203   Resp 20 11/25/22 1203   SpO2 100 % 11/25/22 1203         Event Time   Out of Recovery 12:11:16         Pain/Irma Score: Irma Score: 10 (11/25/2022 12:03 PM)

## 2022-11-25 NOTE — ANESTHESIA POSTPROCEDURE EVALUATION
Anesthesia Post Evaluation    Patient: Daya Limon    Procedure(s) Performed: Procedure(s) (LRB):  EGD (ESOPHAGOGASTRODUODENOSCOPY) (N/A)    Final Anesthesia Type: general      Patient location during evaluation: GI PACU  Patient participation: Yes- Able to Participate  Level of consciousness: awake and alert and oriented  Post-procedure vital signs: reviewed and stable  Pain management: adequate  Airway patency: patent    PONV status at discharge: No PONV  Anesthetic complications: no      Cardiovascular status: blood pressure returned to baseline and hemodynamically stable  Respiratory status: unassisted, spontaneous ventilation and room air  Hydration status: euvolemic  Follow-up not needed.          Vitals Value Taken Time   /61 11/25/22 1139   Temp 98.1 11/25/22 1139   Pulse 79 11/25/22 1139   Resp 20 11/25/22 1139   SpO2 99 11/25/22 1139         No case tracking events are documented in the log.      Pain/Irma Score: Irma Score: 9 (11/25/2022 11:37 AM)

## 2022-11-25 NOTE — ANESTHESIA PREPROCEDURE EVALUATION
2022  Daya Limon is a 42 y.o., female.    Procedure Summary    Case: [id number] Date/Time: 22   Procedure: EGD (ESOPHAGOGASTRODUODENOSCOPY) (Abdomen)   Anesthesia type: Monitor Anesthesia Care   Diagnosis: Gastroesophageal reflux disease, unspecified whether esophagitis present [K21.9]     Patient Active Problem List   Diagnosis    Left-sided low back pain without sciatica    Hair loss    GERD (gastroesophageal reflux disease)    Vitamin D deficiency    Right thyroid nodule    Cough due to bronchospasm    Chronic cough    Abdominal pain    Chronic left-sided thoracic back pain    Decreased strength     Past Surgical History:   Procedure Laterality Date     SECTION N/A 2021    Procedure:  SECTION;  Surgeon: Lashanda Liu DO;  Location: [facility];  Service: OB/GYN;  Laterality: N/A;    VAGINAL DELIVERY      X2     Current Discharge Medication List      CONTINUE these medications which have NOT CHANGED    Details   albuterol (VENTOLIN HFA) 90 mcg/actuation inhaler Inhale 2 puffs into the lungs every 6 (six) hours as needed for Wheezing. Rescue  Qty: 8.5 g, Refills: 11    Associated Diagnoses: Post-viral cough syndrome; Nocturnal cough with wheeze; Cough due to bronchospasm      cholecalciferol, vitamin D3, 125 mcg (5,000 unit) Tab Take 1 tablet (5,000 Units total) by mouth daily with breakfast.  Qty: 90 tablet, Refills: 3    Associated Diagnoses: Vitamin D deficiency      diclofenac sodium (VOLTAREN) 1 % Gel Apply 2 g topically 4 (four) times daily as needed.  Qty: 100 g, Refills: 11    Associated Diagnoses: Left wrist tendonitis      levocetirizine (XYZAL) 5 MG tablet Take 1 tablet (5 mg total) by mouth every evening.  Qty: 30 tablet, Refills: 11    Associated Diagnoses: Seasonal allergies      pantoprazole (PROTONIX) 40 MG tablet Take 1 tablet (40 mg total)  by mouth before breakfast.  Qty: 90 tablet, Refills: 4    Associated Diagnoses: Gastroesophageal reflux disease, unspecified whether esophagitis present      PNV,calcium 72-iron-folic acid (WESTAB PLUS) 27 mg iron- 1 mg Tab Take 1 tablet (1 each total) by mouth once daily.  Qty: 90 tablet, Refills: 4    Associated Diagnoses: Lactating mother      azelastine (ASTELIN) 137 mcg (0.1 %) nasal spray 1 spray (137 mcg total) by Nasal route 2 (two) times daily.  Qty: 30 mL, Refills: 11    Associated Diagnoses: Cough due to bronchospasm; Non-seasonal allergic rhinitis, unspecified trigger      dextromethorphan HBr 2.5 mg Lozg Use ase directed for cough  Qty: 100 each, Refills: 0    Associated Diagnoses: Cough due to bronchospasm      docusate sodium (COLACE) 100 MG capsule Take 1 capsule (100 mg total) by mouth 2 (two) times daily as needed for Constipation.  Qty: 30 capsule, Refills: 3      fluticasone propionate (FLONASE) 50 mcg/actuation nasal spray 2 sprays (100 mcg total) by Each Nostril route once daily. Take daily as needed for nasal congestion  Qty: 16 g, Refills: 11    Associated Diagnoses: Fever, unspecified fever cause; Viral syndrome; Seasonal allergic rhinitis due to other allergic trigger      ibuprofen (ADVIL,MOTRIN) 600 MG tablet Take 1 tablet (600 mg total) by mouth every 6 (six) hours as needed for Pain.  Qty: 30 tablet, Refills: 3    Associated Diagnoses: Chronic left-sided thoracic back pain; Chronic bilateral low back pain without sciatica      abena perry ointment Apply topically 3 (three) times daily. Apply after feeding. Do not wash off. This compounded medication expires in 30 days.  Qty: 30 g, Refills: 1      ketoconazole (NIZORAL) 2 % shampoo Apply topically twice a week.  Qty: 120 mL, Refills: 11    Associated Diagnoses: Seborrheic dermatitis; Seborrheic blepharitis, unspecified laterality      prenatal vit 28-iron fum-folic 27 mg iron- 1 mg Tab Take 1 tablet by mouth daily with breakfast.  Qty:  90 tablet, Refills: 3    Associated Diagnoses: Pregnancy test-positive      triamcinolone acetonide 0.1% (KENALOG) 0.1 % ointment Apply topically 2 (two) times daily.  Qty: 80 g, Refills: 3    Associated Diagnoses: Eczema of left hand; Atopic dermatitis, unspecified type               Pre-op Assessment    I have reviewed the Patient Summary Reports.     I have reviewed the Nursing Notes. I have reviewed the NPO Status.   I have reviewed the Medications.     Review of Systems  Anesthesia Hx:  Denies Family Hx of Anesthesia complications.   Denies Personal Hx of Anesthesia complications.   Social:  Non-Smoker, No Alcohol Use    Cardiovascular:  Cardiovascular Normal     Pulmonary:  Pulmonary Normal    Hepatic/GI:   GERD        Physical Exam    Airway:  Mallampati: II / II  Mouth Opening: Normal  TM Distance: Normal  Tongue: Normal  Neck ROM: Normal ROM    Dental:  Intact        Anesthesia Plan  Type of Anesthesia, risks & benefits discussed:    Anesthesia Type: Gen Natural Airway  Intra-op Monitoring Plan: Standard ASA Monitors  Induction:  IV  Airway Plan: Direct  Informed Consent: Informed consent signed with the Patient and all parties understand the risks and agree with anesthesia plan.  All questions answered.   ASA Score: 2    Ready For Surgery From Anesthesia Perspective.     .

## 2022-11-25 NOTE — PROVATION PATIENT INSTRUCTIONS
Discharge Summary/Instructions after an Endoscopic Procedure  Patient Name: Daya Limon  Patient MRN: [id number]  Patient YOB: 1980 Friday, November 25, 2022  Chano Fritz MD  Dear patient,  As a result of recent federal legislation (The Federal Cures Act), you may   receive lab or pathology results from your procedure in your [facility]   account before your physician is able to contact you. Your physician or   their representative will relay the results to you with their   recommendations at their soonest availability.  Thank you,  Your health is very important to us during the Covid Crisis. Following your   procedure today, you will receive a daily text for 2 weeks asking about   signs or symptoms of Covid 19.  Please respond to this text when you   receive it so we can follow up and keep you as safe as possible.   RESTRICTIONS:  During your procedure today, you received medications for sedation.  These   medications may affect your judgment, balance and coordination.  Therefore,   for 24 hours, you have the following restrictions:   - DO NOT drive a car, operate machinery, make legal/financial decisions,   sign important papers or drink alcohol.    ACTIVITY:  Today: no heavy lifting, straining or running due to procedural   sedation/anesthesia.  The following day: return to full activity including work.  DIET:  Eat and drink normally unless instructed otherwise.     TREATMENT FOR COMMON SIDE EFFECTS:  - Mild abdominal pain, nausea, belching, bloating or excessive gas:  rest,   eat lightly and use a heating pad.  - Sore Throat: treat with throat lozenges and/or gargle with warm salt   water.  - Because air was used during the procedure, expelling large amounts of air   from your rectum or belching is normal.  - If a bowel prep was taken, you may not have a bowel movement for 1-3 days.    This is normal.  SYMPTOMS TO WATCH FOR AND REPORT TO YOUR PHYSICIAN:  1. Abdominal pain or bloating, other than  gas cramps.  2. Chest pain.  3. Back pain.  4. Signs of infection such as: chills or fever occurring within 24 hours   after the procedure.  5. Rectal bleeding, which would show as bright red, maroon, or black stools.   (A tablespoon of blood from the rectum is not serious, especially if   hemorrhoids are present.)  6. Vomiting.  7. Weakness or dizziness.  GO DIRECTLY TO THE NEAREST EMERGENCY ROOM IF YOU HAVE ANY OF THE FOLLOWING:      Difficulty breathing              Chills and/or fever over 101 F   Persistent vomiting and/or vomiting blood   Severe abdominal pain   Severe chest pain   Black, tarry stools   Bleeding- more than one tablespoon   Any other symptom or condition that you feel may need urgent attention  Your doctor recommends these additional instructions:  If any biopsies were taken, your doctors clinic will contact you in 1 to 2   weeks with any results.  - Discharge patient to home.   - Patient has a contact number available for emergencies.  The signs and   symptoms of potential delayed complications were discussed with the   patient.  Return to normal activities tomorrow.  Written discharge   instructions were provided to the patient.   - Resume previous diet.   - Continue present medications.   - Await pathology results.  For questions, problems or results please call your physician - Chano Fritz MD.  EMERGENCY PHONE NUMBER: [phone],  LAB RESULTS: [phone]  IF A COMPLICATION OR EMERGENCY SITUATION ARISES AND YOU ARE UNABLE TO REACH   YOUR PHYSICIAN - GO DIRECTLY TO THE EMERGENCY ROOM.  Chano Fritz MD  11/25/2022 11:32:41 AM  This report has been verified and signed electronically.  Dear patient,  As a result of recent federal legislation (The Federal Cures Act), you may   receive lab or pathology results from your procedure in your [facility]   account before your physician is able to contact you. Your physician or   their representative will relay the results to you with  their   recommendations at their soonest availability.  Thank you,  PROVATION

## 2022-12-06 ENCOUNTER — CLINICAL SUPPORT (OUTPATIENT)
Dept: REHABILITATION | Facility: HOSPITAL | Age: 42
End: 2022-12-06
Payer: COMMERCIAL

## 2022-12-06 DIAGNOSIS — R53.1 DECREASED STRENGTH: Primary | ICD-10-CM

## 2022-12-06 DIAGNOSIS — G89.29 CHRONIC BILATERAL BACK PAIN, UNSPECIFIED BACK LOCATION: ICD-10-CM

## 2022-12-06 DIAGNOSIS — G89.29 CHRONIC LEFT-SIDED LOW BACK PAIN WITHOUT SCIATICA: ICD-10-CM

## 2022-12-06 DIAGNOSIS — M53.86 DECREASED ROM OF LUMBAR SPINE: ICD-10-CM

## 2022-12-06 DIAGNOSIS — Z98.891 HISTORY OF CESAREAN SECTION: ICD-10-CM

## 2022-12-06 DIAGNOSIS — M54.9 CHRONIC BILATERAL BACK PAIN, UNSPECIFIED BACK LOCATION: ICD-10-CM

## 2022-12-06 DIAGNOSIS — M54.50 CHRONIC LEFT-SIDED LOW BACK PAIN WITHOUT SCIATICA: ICD-10-CM

## 2022-12-06 LAB
FINAL PATHOLOGIC DIAGNOSIS: NORMAL
GROSS: NORMAL
Lab: NORMAL

## 2022-12-06 PROCEDURE — 97161 PT EVAL LOW COMPLEX 20 MIN: CPT | Mod: PN

## 2022-12-06 PROCEDURE — 97110 THERAPEUTIC EXERCISES: CPT | Mod: PN

## 2022-12-07 PROBLEM — M53.86 DECREASED ROM OF LUMBAR SPINE: Status: ACTIVE | Noted: 2022-12-07

## 2022-12-08 NOTE — PLAN OF CARE
[facility]  Physical Therapy Initial Evaluation    Name: Daya Limon  Clinic Number: [id number]    Therapy Diagnosis:   Encounter Diagnoses   Name Primary?    Chronic left-sided low back pain without sciatica     History of  section     Chronic bilateral back pain, unspecified back location     Decreased ROM of lumbar spine     Decreased strength Yes     Physician: Esthela Abdi MD    Physician Orders: PT Eval and Treat   Medical Diagnosis from Referral:   M54.50,G89.29 (ICD-10-CM) - Chronic left-sided low back pain without sciatica   Z98.891 (ICD-10-CM) - History of  section   M54.9,G89.29 (ICD-10-CM) - Chronic bilateral back pain, unspecified back location     Evaluation Date: 2022  Authorization Period Expiration: 22  Plan of Care Expiration: 2/3/23  Visit # / Visits authorized:   FOTO: 1/10    Time In: 2:23  Time Out: 3:00  Total Billable Time: 37 minutes (low Complexity Evaluation, Therapeutic Exercise - 1)    Precautions: Standard,  section     Subjective   Date of onset: 1-2 yrs  History of current condition - Daya reports: back pain on and off for 1-2 years. She was coming to PT for upper back pain which worsened after  21 but stopped. Pain with sitting and standing too long and doing household chores. She has 4 daughters and has pain lifting the youngest one. She reports no difficulty sleeping. She denies numbness or tingling. Pt reports her  was very traumatic, with delivery of child prematurely at 34 weeks. Following, her incision became infected which delayed her recovery. She also reports some stress incontinence since delivery of her child that causes her to rush to the bathroom and limit water intake. Also reports right elbow and left wrist pain.     Medical History:   Past Medical History:   Diagnosis Date    GERD (gastroesophageal reflux disease) 2017    Meibomian gland dysfunction (MGD)     Right  thyroid nodule 10/20/2019    S/P (-) FNA 19 per endocrine dr bass     S/P primary low transverse  2021    Vitamin D deficiency 2017       Surgical History:   Daya Limon  has a past surgical history that includes Vaginal delivery;  section (N/A, 2021); and Esophagogastroduodenoscopy (N/A, 2022).    Medications:   Daya has a current medication list which includes the following prescription(s): albuterol, azelastine, cholecalciferol (vitamin d3), dextromethorphan hbr, diclofenac sodium, docusate sodium, fluticasone propionate, ibuprofen, abena perry ointment, ketoconazole, levocetirizine, pantoprazole, pnv,calcium 72-iron-folic acid, prenatal vit 28-iron fum-folic, and triamcinolone acetonide 0.1%, and the following Facility-Administered Medications: triamcinolone acetonide.    Allergies:   Review of patient's allergies indicates:  No Known Allergies     Imaging, none     Prior Therapy: PT for back   Social History:  lives with their family  Occupation: housewife   Prior Level of Function: independent with ADLs   Current Level of Function: pain with sitting, standing, household chores, lifting     Pain:   Current 6/10, worst 8/10, best 0/10   Location: bilateral back   Description: Aching, decreased muscle endurance  Aggravating Factors: pain with sitting, standing, household chores, lifting   Easing Factors: massage, pain medication, and heating pad    Pts goals: decrease pain and do housework without pain.     Objective     Posture: slumped posture, increased kyphosis and lumbar lordosis, hinge point around TL junction  Palpation: tenderness along thoracolumbar paraspinals greatest around TL junction  Sensation: normal light touch     Range of Motion/Strength:     Lumbar AROM Pain/Dysfunction with Movement:   Flexion 50    Extension 10 Hinge at TL junction   Right side bending 20    Left side bending 25    Right rotation 80%    Left rotation 80&      Hip Right Left  "  AROM/PROM     flexion  Within normal limits   Within normal limits    Internal rotation  Increased range of motion   Increased range of motion     External rotation   Increased range of motion   Increased range of motion       L/E MMT Right Left Pain/Dysfunction with Movement   Hip Flexion 4/5 4/5    Hip Extension 4-/5 4-/5    Hip Abduction 4-/5 4-/5    Hip Adduction 4+/5 4+/5    Hip IR 4-/5 4-/5    Hip ER 4/5 4/5    Knee Flexion 4/5 4/5    Knee Extension 4+/5 4+/5    Ankle DF 5/5 5/5    Ankle PF 4+/5 4+/5    Mid traps/low traps/ rhomboids 4-/5 bilateral     Joint Mobility:   - Thoracic: decreased lower thoracic p/a with pain  - Lumbar: decreased upper lumbar p/a with pain     Flexibility: normal hamstrings       CMS Impairment/Limitation/Restriction for FOTO lumbar Survey    Therapist reviewed FOTO scores for Daya Limon on 12/6/2022.   FOTO documents entered into [employer] - see Media section.    Limitation Score: 59%  Category: Mobility         TREATMENT   Treatment Time In: 2:50  Treatment Time Out: 3:00  Total Treatment time separate from Evaluation: 10 minutes    Daya received therapeutic exercises to develop strength, endurance, ROM, flexibility, posture, and core stabilization for 10 minutes including:  Supine transverse abdominus activation 10x with breath (exhale) (addition of kegal to home exercise program)  Transverse abdominus activation ball squeeze 10x5"  Shoulder extension with transverse abdominus activation green theraband 8x  Open book 10x bilateral     Home Exercises Provided and Patient Education Provided     Education provided:   Anatomy and Pathology.  Symptom management and plan of care progressions.  Home Exercise Program.    Written Home Exercises Provided: yes.  Exercises were reviewed and Daya was able to demonstrate them prior to the end of the session.  Daya demonstrated good  understanding of the education provided.     See EMR under Patient Instructions for exercises provided " 2022.      Assessment   Daya is a 42 y.o. female referred to outpatient Physical Therapy with a medical diagnosis of chronic left side low back pain without sciatica, bilateral low back pain , history of  section. Pt presents with chronic thoracolumbar pain without radiculopathy with movement coordination impairments. Contributing factors include traumatic  with hx of infection and delayed healing. Pt presents with decreased lumbar range of motion, postural impairment, decreased periscapular, lower extremity, and core strength, impaired balance and gait, decreased thoracic and lumbar joint mobility, and decreased functional mobility. These impairments impact her ability to perform household chores and lift and carry children. Discussed the importance of core stabilization and strengthening and role of PT Post-partum. May consider referral to pelvic floor PT in future if warranted to address incontinence and assess for additional pelvic floor impairments related to this condition.       Pt prognosis is Good.   Pt will benefit from skilled outpatient Physical Therapy to address the deficits stated above and in the chart below, provide pt/family education, and to maximize pt's level of independence.     Plan of care discussed with patient: Yes  Pt's spiritual, cultural and educational needs considered and patient is agreeable to the plan of care and goals as stated below:     Anticipated Barriers for therapy: chronicity     Medical Necessity is demonstrated by the following  History  Co-morbidities and personal factors that may impact the plan of care Co-morbidities: , Vit D deficiency, GERD    Personal Factors:   lifestyle     low   Examination  Body Structures and Functions, activity limitations and participation restrictions that may impact the plan of care Body Regions:   back  lower extremities  upper extremities  trunk    Body Systems:    gross  symmetry  ROM  strength  balance  gait  transfers  transitions  motor control    Participation Restrictions:   Lifting children, household chores    Activity limitations:   Learning and applying knowledge  no deficits    General Tasks and Commands  no deficits    Communication  no deficits    Mobility  lifting and carrying objects  walking    Self care  dressing    Domestic Life  shopping  cooking  doing house work (cleaning house, washing dishes, laundry)  assisting others    Interactions/Relationships  no deficits    Life Areas  no deficits    Community and Social Life  community life  recreation and leisure  [employer]beterian and spirituality         low   Clinical Presentation stable and uncomplicated low   Decision Making/ Complexity Score: low     Short Term Goals (4 Weeks):  1. Pt will be compliant with HEP to supplement PT in decreasing pain with functional mobility.  2. Pt will perform deadbug with good control to demonstrate improved core strength.  3. Pt will report no pain during thoracolumbar ROM to promote functional mobility.  4. Pt will improve impaired LE MMTs to >/=4/5 to improve strength for functional tasks.  Long Term Goals (8 Weeks):   1. Pt will improve FOTO score to </= 40% limited to decrease perceived limitation with maintaining/changing body position.   2. Pt will perform 20lb floor to waist lift with good control to demonstrate improved core strength for lifting child.  3. Pt will improve impaired LE MMTs to >/=4+/5 to improve strength for functional tasks.  4. Pt will report no pain during washing dishes and sweeping to promote house duties.       Plan   Plan of care Certification: 12/6/2022 to 2/3/23.    Outpatient Physical Therapy 2 times weekly for 8 weeks to include the following interventions: Cervical/Lumbar Traction, Electrical Stimulation  , Gait Training, Manual Therapy, Moist Heat/ Ice, Neuromuscular Re-ed, Patient Education, Therapeutic Activities, and Therapeutic Exercise, VIKRAM  Kinesiotaping PRN, Functional Dry Needling    ARPIT COFFMAN, PT, DPT, Cert.DN

## 2022-12-12 ENCOUNTER — LAB VISIT (OUTPATIENT)
Dept: LAB | Facility: HOSPITAL | Age: 42
End: 2022-12-12
Attending: FAMILY MEDICINE
Payer: COMMERCIAL

## 2022-12-12 DIAGNOSIS — R79.89 ABNORMAL TSH: ICD-10-CM

## 2022-12-12 LAB
THYROPEROXIDASE IGG SERPL-ACNC: 286.9 IU/ML
TSH SERPL DL<=0.005 MIU/L-ACNC: 3.76 UIU/ML (ref 0.4–4)

## 2022-12-12 PROCEDURE — 36415 COLL VENOUS BLD VENIPUNCTURE: CPT | Performed by: FAMILY MEDICINE

## 2022-12-12 PROCEDURE — 86376 MICROSOMAL ANTIBODY EACH: CPT | Performed by: FAMILY MEDICINE

## 2022-12-12 PROCEDURE — 84443 ASSAY THYROID STIM HORMONE: CPT | Performed by: FAMILY MEDICINE

## 2022-12-13 ENCOUNTER — PATIENT MESSAGE (OUTPATIENT)
Dept: FAMILY MEDICINE | Facility: CLINIC | Age: 42
End: 2022-12-13
Payer: COMMERCIAL

## 2022-12-13 ENCOUNTER — TELEPHONE (OUTPATIENT)
Dept: FAMILY MEDICINE | Facility: CLINIC | Age: 42
End: 2022-12-13
Payer: COMMERCIAL

## 2022-12-13 DIAGNOSIS — R79.89 ABNORMAL THYROID BLOOD TEST: Primary | ICD-10-CM

## 2022-12-21 ENCOUNTER — DOCUMENTATION ONLY (OUTPATIENT)
Dept: REHABILITATION | Facility: HOSPITAL | Age: 42
End: 2022-12-21
Payer: COMMERCIAL

## 2022-12-21 PROBLEM — G89.29 CHRONIC LEFT-SIDED THORACIC BACK PAIN: Status: RESOLVED | Noted: 2022-05-18 | Resolved: 2022-12-21

## 2022-12-21 PROBLEM — R53.1 DECREASED STRENGTH: Status: RESOLVED | Noted: 2022-05-18 | Resolved: 2022-12-21

## 2022-12-21 PROBLEM — M54.6 CHRONIC LEFT-SIDED THORACIC BACK PAIN: Status: RESOLVED | Noted: 2022-05-18 | Resolved: 2022-12-21

## 2022-12-21 NOTE — PROGRESS NOTES
Patient was evaluated on 2022 and was seen 3 times for physical therapy. Patient has not attended physical therapy since 2022. Patient given home exercise program. Plan of care and/or authorization . Patient with new evaluation for low back pain in early December. Patient to be discharged from this episode at this time.

## 2023-01-17 ENCOUNTER — TELEPHONE (OUTPATIENT)
Dept: REHABILITATION | Facility: HOSPITAL | Age: 43
End: 2023-01-17
Payer: COMMERCIAL

## 2023-01-24 ENCOUNTER — CLINICAL SUPPORT (OUTPATIENT)
Dept: REHABILITATION | Facility: HOSPITAL | Age: 43
End: 2023-01-24
Payer: COMMERCIAL

## 2023-01-24 ENCOUNTER — DOCUMENTATION ONLY (OUTPATIENT)
Dept: REHABILITATION | Facility: HOSPITAL | Age: 43
End: 2023-01-24
Payer: COMMERCIAL

## 2023-01-24 DIAGNOSIS — Z98.891 HISTORY OF CESAREAN SECTION: ICD-10-CM

## 2023-01-24 DIAGNOSIS — G89.29 CHRONIC BILATERAL BACK PAIN, UNSPECIFIED BACK LOCATION: ICD-10-CM

## 2023-01-24 DIAGNOSIS — M54.50 CHRONIC LEFT-SIDED LOW BACK PAIN WITHOUT SCIATICA: ICD-10-CM

## 2023-01-24 DIAGNOSIS — M53.86 DECREASED ROM OF LUMBAR SPINE: Primary | ICD-10-CM

## 2023-01-24 DIAGNOSIS — G89.29 CHRONIC LEFT-SIDED LOW BACK PAIN WITHOUT SCIATICA: ICD-10-CM

## 2023-01-24 DIAGNOSIS — M54.9 CHRONIC BILATERAL BACK PAIN, UNSPECIFIED BACK LOCATION: ICD-10-CM

## 2023-01-24 DIAGNOSIS — R53.1 DECREASED STRENGTH: ICD-10-CM

## 2023-01-24 PROCEDURE — 97110 THERAPEUTIC EXERCISES: CPT | Mod: PN,CQ

## 2023-01-24 NOTE — PROGRESS NOTES
"[facility]   Physical Therapy Treatment Note     Name: Daya Limon  Clinic Number: [id number]    Therapy Diagnosis:   Encounter Diagnoses   Name Primary?    Chronic left-sided low back pain without sciatica     History of  section     Chronic bilateral back pain, unspecified back location     Decreased ROM of lumbar spine Yes    Decreased strength      Physician: Esthela Abdi MD    Visit Date: 2023    Physician Orders: PT Eval and Treat   Medical Diagnosis from Referral:   M54.50,G89.29 (ICD-10-CM) - Chronic left-sided low back pain without sciatica   Z98.891 (ICD-10-CM) - History of  section   M54.9,G89.29 (ICD-10-CM) - Chronic bilateral back pain, unspecified back location      Evaluation Date: 2022  Authorization Period Expiration: 22  Plan of Care Expiration: 2/3/23  Visit # / Visits authorized:   FOTO: 2/10  PTA Visit #:      Time In: 10:25  Time Out: 10:55  Total Billable Time: 30 minutes (2TE)    Precautions: Standard,  section     SUBJECTIVE     Pt reports: Has not been to PT due to her child being sick. Has tried to complete HEP when she can. Finds that the HEP does help her pain.   She was compliant with home exercise program.  Response to previous treatment: Eval only  Functional change: Ongoing    Pain: 4/10  Location: bilateral back     OBJECTIVE     Objective Measures updated at progress report unless specified.     Treatment     Daya received the treatments listed below:      therapeutic exercises to develop strength, endurance, ROM, flexibility, posture, and core stabilization for 30 minutes including:  Supine transverse abdominus activation 20x with breath (exhale)  Transverse abdominus activation ball squeeze 20x5"  Transverse abdominus activation with hip abduction gait belt 20x5"  Glut squeezes 5" x 10  Shoulder extension with transverse abdominus activation green theraband not today, next  Open book 15x " bilateral    Reassessment completed by Nahomy Oliveira DPT.        Patient Education and Home Exercises     Home Exercises Provided and Patient Education Provided     Education provided:   Anatomy and Pathology.  Symptom management and plan of care progressions.  Home Exercise Program.    Written Home Exercises Provided: Patient instructed to cont prior HEP. Exercises were reviewed and Daya was able to demonstrate them prior to the end of the session.  Daya demonstrated good  understanding of the education provided. See EMR under Patient Instructions for exercises provided during therapy sessions    ASSESSMENT     Daya is a 42 y.o. female referred to outpatient Physical Therapy with a medical diagnosis of chronic left side low back pain without sciatica, bilateral low back pain , history of  section. Patient presents back >30 since initial evaluation. Reports she was unable to make previous appointments due to her baby being ill. Reassessment completed buy Nahomy Oliveira DPT, see note for full details. Focused session on improving hip and core strengthening along with thoracolumbar mobility. Overall good tolerance. Will continue to progress as appropriate.     Daya Is progressing well towards her goals.   Pt prognosis is Good.     Pt will continue to benefit from skilled outpatient physical therapy to address the deficits listed in the problem list box on initial evaluation, provide pt/family education and to maximize pt's level of independence in the home and community environment.     Pt's spiritual, cultural and educational needs considered and pt agreeable to plan of care and goals.     Anticipated barriers to physical therapy: chronicity    Goals:   Short Term Goals (4 Weeks):  1. Pt will be compliant with HEP to supplement PT in decreasing pain with functional mobility.  2. Pt will perform deadbug with good control to demonstrate improved core strength.  3. Pt will report no pain during  thoracolumbar ROM to promote functional mobility.  4. Pt will improve impaired LE MMTs to >/=4/5 to improve strength for functional tasks.  Long Term Goals (8 Weeks):   1. Pt will improve FOTO score to </= 40% limited to decrease perceived limitation with maintaining/changing body position.   2. Pt will perform 20lb floor to waist lift with good control to demonstrate improved core strength for lifting child.  3. Pt will improve impaired LE MMTs to >/=4+/5 to improve strength for functional tasks.  4. Pt will report no pain during washing dishes and sweeping to promote house duties.     PLAN     Plan of care Certification: 12/6/2022 to 2/3/23.  Cont with treatment per DOE Stewart, PTA

## 2023-01-24 NOTE — PROGRESS NOTES
[facility]   Physical Therapy progress note    Name: Daya Limon  Clinic Number: [id number]    Therapy Diagnosis:   Encounter Diagnoses   Name Primary?    Chronic left-sided low back pain without sciatica     History of  section     Chronic bilateral back pain, unspecified back location     Decreased ROM of lumbar spine Yes    Decreased strength      Physician: Esthela Abdi MD    Visit Date: 2023  Physician Orders: PT Eval and Treat   Medical Diagnosis from Referral:   M54.50,G89.29 (ICD-10-CM) - Chronic left-sided low back pain without sciatica   Z98.891 (ICD-10-CM) - History of  section   M54.9,G89.29 (ICD-10-CM) - Chronic bilateral back pain, unspecified back location      Evaluation Date: 2022  Authorization Period Expiration: 23  Plan of Care Expiration: 2/3/23  Visit # / Visits authorized:   FOTO: 2/10  PTA Visit #: 0/5     Time In: 10:20  Time Out: 10:25  Total Billable Time: 5 minutes    SUBJECTIVE     Pt reports: she had 6/10 pain yesterday.she was unable to attend therapy due to child being sick. She has also been having right arm pain.   She was partially compliant with home exercise program.  Response to previous treatment: first after evaluation  Functional change: ongoing    Pain: 4/10  Location: bilateral back      OBJECTIVE     Posture: slumped posture, increased kyphosis and lumbar lordosis, hinge point around TL junction  Palpation: tenderness along thoracolumbar paraspinals greatest around TL junction  Sensation: normal light touch      Range of Motion/Strength:      Lumbar AROM Pain/Dysfunction with Movement:   Flexion 50     Extension 12 Hinge at TL junction   Right side bending 20     Left side bending 25     Right rotation 80%     Left rotation 100%        Hip Right Left   AROM/PROM       flexion  Within normal limits   Within normal limits    Internal rotation  Increased range of motion   Increased range of motion       External rotation   Increased range of motion   Increased range of motion         L/E MMT Right Left Pain/Dysfunction with Movement   Hip Flexion 4+/5 4/5     Hip Extension 4-/5 4-/5     Hip Abduction 4-/5 4-/5     Hip Adduction 4+/5 4+/5     Hip IR 4+/5 4-/5     Hip ER 4/5 4/5     Knee Flexion 4+/5 4/5     Knee Extension 4+/5 4+/5     Ankle DF 5/5 5/5     Ankle PF 4+/5 4+/5     Mid traps/low traps/ rhomboids 4-/5 bilateral      Joint Mobility:   - Thoracic: decreased lower thoracic p/a with pain  - Lumbar: decreased upper lumbar p/a with pain greatest at L4-5 region     Flexibility: normal hamstrings      Treatment     Daya received the treatments listed  in daily note by Claudia Stewart PTA     Patient Education and Home Exercises     Home Exercises Provided and Patient Education Provided     Education provided:   - continue home exercise program     Written Home Exercises Provided: yes. Exercises were reviewed and Daya was able to demonstrate them prior to the end of the session.  Daya demonstrated good  understanding of the education provided. See EMR under Patient Instructions for exercises provided during therapy sessions    ASSESSMENT   Pt presents back to PT for first after visit >30 days since evaluation due to her child being sick. She demonstrated some improvements in thoracolumbar range of motion and improvements in right lower extremity strength. Ongoing bilateral (left > right) strength deficits, especially if hips and core. She will continue to benefit from skilled PT with focus on thoracolumbar mobility and core stabilization and posterior chain postural strengthening.       Daya Is progressing well towards her goals.   Pt prognosis is Good.     Pt will continue to benefit from skilled outpatient physical therapy to address the deficits listed in the problem list box on initial evaluation, provide pt/family education and to maximize pt's level of independence in the home and community  environment.     Pt's spiritual, cultural and educational needs considered and pt agreeable to plan of care and goals.     Anticipated barriers to physical therapy: chronicity    Goals:   Short Term Goals (4 Weeks):  1. Pt will be compliant with HEP to supplement PT in decreasing pain with functional mobility.  2. Pt will perform deadbug with good control to demonstrate improved core strength.  3. Pt will report no pain during thoracolumbar ROM to promote functional mobility.  4. Pt will improve impaired LE MMTs to >/=4/5 to improve strength for functional tasks.  Long Term Goals (8 Weeks):   1. Pt will improve FOTO score to </= 40% limited to decrease perceived limitation with maintaining/changing body position.   2. Pt will perform 20lb floor to waist lift with good control to demonstrate improved core strength for lifting child.  3. Pt will improve impaired LE MMTs to >/=4+/5 to improve strength for functional tasks.  4. Pt will report no pain during washing dishes and sweeping to promote house duties.        PLAN   Plan of care Certification: 12/6/2022 to 2/3/23.  Continue thoracolumbar mobility, postural strength, hip strength, core stabilization.     ARPIT COFFMAN, PT

## 2023-01-24 NOTE — PROGRESS NOTES
[facility]   Physical Therapy progress note    Name: Daya Limon  Clinic Number: [id number]    Therapy Diagnosis:   Encounter Diagnoses   Name Primary?    Chronic left-sided low back pain without sciatica     History of  section     Chronic bilateral back pain, unspecified back location     Decreased ROM of lumbar spine Yes    Decreased strength      Physician: Esthela Abdi MD    Visit Date: 2023  Physician Orders: PT Eval and Treat   Medical Diagnosis from Referral:   M54.50,G89.29 (ICD-10-CM) - Chronic left-sided low back pain without sciatica   Z98.891 (ICD-10-CM) - History of  section   M54.9,G89.29 (ICD-10-CM) - Chronic bilateral back pain, unspecified back location      Evaluation Date: 2022  Authorization Period Expiration: 23  Plan of Care Expiration: 2/3/23  Visit # / Visits authorized:   FOTO: 2/10  PTA Visit #: 0/5     Time In: 10:20  Time Out: 10:25  Total Billable Time: 5 minutes    SUBJECTIVE     Pt reports: she had 6/10 pain yesterday.she was unable to attend therapy due to child being sick. She has also been having right arm pain.   She was partially compliant with home exercise program.  Response to previous treatment: first after evaluation  Functional change: ongoing    Pain: 4/10  Location: bilateral back      OBJECTIVE     Posture: slumped posture, increased kyphosis and lumbar lordosis, hinge point around TL junction  Palpation: tenderness along thoracolumbar paraspinals greatest around TL junction  Sensation: normal light touch      Range of Motion/Strength:      Lumbar AROM Pain/Dysfunction with Movement:   Flexion 50     Extension 12 Hinge at TL junction   Right side bending 20     Left side bending 25     Right rotation 80%     Left rotation 100%        Hip Right Left   AROM/PROM       flexion  Within normal limits   Within normal limits    Internal rotation  Increased range of motion   Increased range of motion       External rotation   Increased range of motion   Increased range of motion         L/E MMT Right Left Pain/Dysfunction with Movement   Hip Flexion 4+/5 4/5     Hip Extension 4-/5 4-/5     Hip Abduction 4-/5 4-/5     Hip Adduction 4+/5 4+/5     Hip IR 4+/5 4-/5     Hip ER 4/5 4/5     Knee Flexion 4+/5 4/5     Knee Extension 4+/5 4+/5     Ankle DF 5/5 5/5     Ankle PF 4+/5 4+/5     Mid traps/low traps/ rhomboids 4-/5 bilateral      Joint Mobility:   - Thoracic: decreased lower thoracic p/a with pain  - Lumbar: decreased upper lumbar p/a with pain greatest at L4-5 region     Flexibility: normal hamstrings      Treatment     Daya received the treatments listed  in daily note by Claudia Stewart PTA     Patient Education and Home Exercises     Home Exercises Provided and Patient Education Provided     Education provided:   - continue home exercise program     Written Home Exercises Provided: yes. Exercises were reviewed and Daya was able to demonstrate them prior to the end of the session.  Daya demonstrated good  understanding of the education provided. See EMR under Patient Instructions for exercises provided during therapy sessions    ASSESSMENT   Pt presents back to PT for first after visit >30 days since evaluation due to her child being sick. She demonstrated some improvements in thoracolumbar range of motion and improvements in right lower extremity strength. Ongoing bilateral (left > right) strength deficits, especially if hips and core. She will continue to benefit from skilled PT with focus on thoracolumbar mobility and core stabilization and posterior chain postural strengthening.       Daya Is progressing well towards her goals.   Pt prognosis is Good.     Pt will continue to benefit from skilled outpatient physical therapy to address the deficits listed in the problem list box on initial evaluation, provide pt/family education and to maximize pt's level of independence in the home and community  environment.     Pt's spiritual, cultural and educational needs considered and pt agreeable to plan of care and goals.     Anticipated barriers to physical therapy: chronicity    Goals:   Short Term Goals (4 Weeks):  1. Pt will be compliant with HEP to supplement PT in decreasing pain with functional mobility.  2. Pt will perform deadbug with good control to demonstrate improved core strength.  3. Pt will report no pain during thoracolumbar ROM to promote functional mobility.  4. Pt will improve impaired LE MMTs to >/=4/5 to improve strength for functional tasks.  Long Term Goals (8 Weeks):   1. Pt will improve FOTO score to </= 40% limited to decrease perceived limitation with maintaining/changing body position.   2. Pt will perform 20lb floor to waist lift with good control to demonstrate improved core strength for lifting child.  3. Pt will improve impaired LE MMTs to >/=4+/5 to improve strength for functional tasks.  4. Pt will report no pain during washing dishes and sweeping to promote house duties.        PLAN   Plan of care Certification: 12/6/2022 to 2/3/23.  Continue thoracolumbar mobility, postural strength, hip strength, core stabilization.     ARPIT COFFMAN, PT

## 2023-01-27 ENCOUNTER — DOCUMENTATION ONLY (OUTPATIENT)
Dept: REHABILITATION | Facility: HOSPITAL | Age: 43
End: 2023-01-27
Payer: COMMERCIAL

## 2023-01-27 NOTE — PROGRESS NOTES
Physical therapist and physical therapy assistant(s) met face to face to discuss patient's treatment plan and progress towards established goals. Pt will be seen by a physical therapist minimally every 6th visit or every 30 days.    ARPIT COFFMAN, PT, DPT    Jammie Stewart, PTA    Lisha Gonzalez, PTA

## 2023-01-31 ENCOUNTER — CLINICAL SUPPORT (OUTPATIENT)
Dept: REHABILITATION | Facility: HOSPITAL | Age: 43
End: 2023-01-31
Payer: COMMERCIAL

## 2023-01-31 DIAGNOSIS — M53.86 DECREASED ROM OF LUMBAR SPINE: Primary | ICD-10-CM

## 2023-01-31 PROCEDURE — 97110 THERAPEUTIC EXERCISES: CPT | Mod: PN,CQ

## 2023-01-31 NOTE — PROGRESS NOTES
"[facility] OUTPATIENT THERAPY AND WELLNESS   Physical Therapy Treatment Note     Name: Daya Limon  Clinic Number: [id number]    Therapy Diagnosis:   Encounter Diagnosis   Name Primary?    Decreased ROM of lumbar spine Yes       Physician: Esthela Abdi MD    Visit Date: 2023    Physician Orders: PT Eval and Treat   Medical Diagnosis from Referral:   M54.50,G89.29 (ICD-10-CM) - Chronic left-sided low back pain without sciatica   Z98.891 (ICD-10-CM) - History of  section   M54.9,G89.29 (ICD-10-CM) - Chronic bilateral back pain, unspecified back location      Evaluation Date: 2022  Authorization Period Expiration: 22  Plan of Care Expiration: 2/3/23  Visit # / Visits authorized: +eval  FOTO: 3/10  PTA Visit #:      Time In: 10:15 am  Time Out: 11:00 am  Total Billable Time: 45 minutes (3TE)    Precautions: Standard,  section     SUBJECTIVE     Pt reports: she has been doing her home exercise program.   States she finds she feels better when she does it regularly.  Reports continued difficulty attending regularly due to her baby being ill. Complaints of right upper extremity soreness/pain and tingling into fingers.  She was compliant with home exercise program.  Response to previous treatment: less pain  Functional change: Ongoing    Pain: 6/10  Location: bilateral back     OBJECTIVE     Objective Measures updated at progress report unless specified.     Treatment     Daya received the treatments listed below:      therapeutic exercises to develop strength, endurance, ROM, flexibility, posture, and core stabilization for 45 minutes including:  Supine transverse abdominus activation 20x with breath (exhale)  Transverse abdominus activation ball squeeze 20x5"  Transverse abdominus activation with hip abduction gait belt 20x5"  Glut squeezes 5" x 10  Shoulder extension with transverse abdominus activation green theraband: 2x10  Open book 15x bilateral     Patient Education and Home " Exercises     Home Exercises Provided and Patient Education Provided     Education provided:   Anatomy and Pathology.  Symptom management and plan of care progressions.  Home Exercise Program.  Incorporation of home exercise program into daily routine.  Use of pillow for upper extremity support when nursing her baby.    Written Home Exercises Provided: Patient instructed to cont prior HEP. Exercises were reviewed and Daya was able to demonstrate them prior to the end of the session.  Daya demonstrated good  understanding of the education provided. See EMR under Patient Instructions for exercises provided during therapy sessions    ASSESSMENT     Daya is a 42 y.o. female referred to outpatient Physical Therapy with a medical diagnosis of chronic left side low back pain without sciatica, bilateral low back pain, history of  section. Focused session on improving hip and core strengthening along with thoracolumbar mobility. Overall good tolerance. Treatment limited by time due to patient late for appointment.  States decreased pain after treatment, not specific to scale. Will continue to progress as appropriate.     Daya Is progressing well towards her goals.   Pt prognosis is Good.     Pt will continue to benefit from skilled outpatient physical therapy to address the deficits listed in the problem list box on initial evaluation, provide pt/family education and to maximize pt's level of independence in the home and community environment.     Pt's spiritual, cultural and educational needs considered and pt agreeable to plan of care and goals.     Anticipated barriers to physical therapy: chronicity    Goals:   Short Term Goals (4 Weeks):  1. Pt will be compliant with HEP to supplement PT in decreasing pain with functional mobility.  2. Pt will perform deadbug with good control to demonstrate improved core strength.  3. Pt will report no pain during thoracolumbar ROM to promote functional mobility.  4. Pt  will improve impaired LE MMTs to >/=4/5 to improve strength for functional tasks.  Long Term Goals (8 Weeks):   1. Pt will improve FOTO score to </= 40% limited to decrease perceived limitation with maintaining/changing body position.   2. Pt will perform 20lb floor to waist lift with good control to demonstrate improved core strength for lifting child.  3. Pt will improve impaired LE MMTs to >/=4+/5 to improve strength for functional tasks.  4. Pt will report no pain during washing dishes and sweeping to promote house duties.     PLAN     Plan of care Certification: 12/6/2022 to 2/3/23.  Cont with treatment per DOE Gonzalez, PTA

## 2023-02-07 ENCOUNTER — CLINICAL SUPPORT (OUTPATIENT)
Dept: REHABILITATION | Facility: HOSPITAL | Age: 43
End: 2023-02-07
Payer: COMMERCIAL

## 2023-02-07 DIAGNOSIS — Z98.891 HISTORY OF CESAREAN SECTION: ICD-10-CM

## 2023-02-07 DIAGNOSIS — M54.50 CHRONIC LEFT-SIDED LOW BACK PAIN WITHOUT SCIATICA: ICD-10-CM

## 2023-02-07 DIAGNOSIS — G89.29 CHRONIC LEFT-SIDED LOW BACK PAIN WITHOUT SCIATICA: ICD-10-CM

## 2023-02-07 DIAGNOSIS — M53.86 DECREASED ROM OF LUMBAR SPINE: Primary | ICD-10-CM

## 2023-02-07 PROCEDURE — 97110 THERAPEUTIC EXERCISES: CPT | Mod: PN

## 2023-02-07 NOTE — PROGRESS NOTES
[facility]   Physical Therapy Treatment Note / plan of care     Name: Daya Limon  Clinic Number: [id number]    Therapy Diagnosis:   Encounter Diagnoses   Name Primary?    Decreased ROM of lumbar spine Yes    Chronic left-sided low back pain without sciatica     History of  section      Physician: Esthela Abdi MD    Visit Date: 2023    Physician Orders: PT Eval and Treat   Medical Diagnosis from Referral:   M54.50,G89.29 (ICD-10-CM) - Chronic left-sided low back pain without sciatica   Z98.891 (ICD-10-CM) - History of  section   M54.9,G89.29 (ICD-10-CM) - Chronic bilateral back pain, unspecified back location      Evaluation Date: 2022  Authorization Period Expiration: 22  Plan of Care Expiration: 3/17/23  Visit # / Visits authorized: 3/20+eval  FOTO: 4/10 2nd complete 23  PTA Visit #: 2     Time In: 10:05 am  Time Out: 11:00 am  Total Billable Time: 55 minutes (4TE)    Precautions: Standard,  section     SUBJECTIVE     Pt reports: she feels better when she does her exercises regularly but has pain if she doesn't. She does have some right side lower extremity pain in the morning. Transverse abdominus activation helps significantly.   She was compliant with home exercise program.  Response to previous treatment: less pain  Functional change: able to stand while cooking and bend down to change baby's diaper    Pain: 5.5/10  Location: bilateral back     OBJECTIVE     Posture: slumped posture, increased kyphosis and lumbar lordosis, hinge point around TL junction  Palpation: tenderness along thoracolumbar paraspinals greatest around TL junction  Sensation: normal light touch      Range of Motion/Strength:      Lumbar AROM Pain/Dysfunction with Movement:   Flexion 50     Extension 12 Hinge at TL junction   Right side bending 20     Left side bending 25     Right rotation 90%     Left rotation 100%        Hip Right Left   AROM/PROM      "  flexion  Within normal limits   Within normal limits    Internal rotation  Increased range of motion   Increased range of motion      External rotation   Increased range of motion   Increased range of motion         L/E MMT Right Left Pain/Dysfunction with Movement   Hip Flexion 4+/5 4/5     Hip Extension 4-/5 4-/5     Hip Abduction 4-/5 4-/5     Hip Adduction 4+/5 4+/5     Hip IR 4+/5 4-/5     Hip ER 4/5 4/5     Knee Flexion 4+/5 4/5     Knee Extension 4+/5 4+/5     Ankle DF 5/5 5/5     Ankle PF 4+/5 4+/5     Mid traps/low traps/ rhomboids 4-/5 bilateral      Joint Mobility:   - Thoracic: decreased lower thoracic p/a with pain  - Lumbar: decreased upper lumbar p/a with pain greatest at L4-5 region     Flexibility: normal hamstrings     30 sec sit to stand: 8 min upper extremity assist last reps    FOTO: 57% limitation     Treatment     Daya received the treatments listed below:      therapeutic exercises to develop strength, endurance, ROM, flexibility, posture, and core stabilization for 55 minutes including:  Supine transverse abdominus activation 20x with breath (exhale)  Transverse abdominus activation ball squeeze 2'x5"  Transverse abdominus activation with hip abduction gait belt 2'x5"  Transverse abdominus activation march 2x1'   Transverse abdominus activation leg lowers 2x1'  Glut squeezes 5" x 10--> bridges 15x3"   Shoulder extension with transverse abdominus activation green theraband: 2x10  Shoulder extension marches 20x bilateral green theraband   Open book 15x bilateral  Sit to stand      Patient Education and Home Exercises     Home Exercises Provided and Patient Education Provided     Education provided:   Anatomy and Pathology.  Symptom management and plan of care progressions.  Home Exercise Program.  Incorporation of home exercise program into daily routine.  Use of pillow for upper extremity support when nursing her baby.    Written Home Exercises Provided: Patient instructed to cont prior " HEP. Exercises were reviewed and Daya was able to demonstrate them prior to the end of the session.  Daya demonstrated good  understanding of the education provided. See EMR under Patient Instructions for exercises provided during therapy sessions    ASSESSMENT     Daya is a 42 y.o. female referred to outpatient Physical Therapy with a medical diagnosis of chronic left side low back pain without sciatica, bilateral low back pain, history of  section.   Pt with 1 month gap in care early in rehab due to her child being sick. She has been to 4 visits to date since 22. She demonstrated some improvements in thoracolumbar range of motion and improvements in right lower extremity strength since beginning PT. Improving utilization of transverse abdominus.  Ongoing bilateral (left > right) strength deficits, especially if hips and core. These improvements have improved her tolerance for house hold chores and bending to change baby. She will continue to benefit from skilled PT with focus on thoracolumbar mobility and core stabilization and posterior chain postural strengthening.     Daya Is progressing well towards her goals.   Pt prognosis is Good.     Pt will continue to benefit from skilled outpatient physical therapy to address the deficits listed in the problem list box on initial evaluation, provide pt/family education and to maximize pt's level of independence in the home and community environment.     Pt's spiritual, cultural and educational needs considered and pt agreeable to plan of care and goals.     Anticipated barriers to physical therapy: chronicity    Goals:   Short Term Goals (4 Weeks):  1. Pt will be compliant with HEP to supplement PT in decreasing pain with functional mobility. Progressing, not met  2. Pt will perform deadbug with good control to demonstrate improved core strength. Progressing, not met  3. Pt will report no pain during thoracolumbar ROM to promote functional  mobility.Progressing, not met  4. Pt will improve impaired LE MMTs to >/=4/5 to improve strength for functional tasks.Progressing, not met    Long Term Goals (8 Weeks):   1. Pt will improve FOTO score to </= 40% limited to decrease perceived limitation with maintaining/changing body position.   2. Pt will perform 20lb floor to waist lift with good control to demonstrate improved core strength for lifting child.Progressing, not met  3. Pt will improve impaired LE MMTs to >/=4+/5 to improve strength for functional tasks.Progressing, not met  4. Pt will report no pain during washing dishes and sweeping to promote house duties. Progressing, not met    PLAN     Plan of care Certification: 2/7/23-3/17/23 1x/week 5 weeks .  Continue with core stabilization program     ARPIT COFFMAN, PT

## 2023-02-07 NOTE — PLAN OF CARE
[facility]   Physical Therapy plan of care     Name: Daya Limon  Clinic Number: [id number]    Therapy Diagnosis:   Encounter Diagnoses   Name Primary?    Decreased ROM of lumbar spine Yes    Chronic left-sided low back pain without sciatica     History of  section      Physician: Esthela Abdi MD    Visit Date: 2023    Physician Orders: PT Eval and Treat   Medical Diagnosis from Referral:   M54.50,G89.29 (ICD-10-CM) - Chronic left-sided low back pain without sciatica   Z98.891 (ICD-10-CM) - History of  section   M54.9,G89.29 (ICD-10-CM) - Chronic bilateral back pain, unspecified back location      Evaluation Date: 2022  Authorization Period Expiration: 22  Plan of Care Expiration: 3/17/23  Visit # / Visits authorized: 3/20+eval  FOTO: 4/10 2nd complete 23  PTA Visit #: 2/5     Time In: 10:05 am  Time Out: 11:00 am  Total Billable Time: 55 minutes (4TE)    Precautions: Standard,  section     SUBJECTIVE     Pt reports: she feels better when she does her exercises regularly but has pain if she doesn't. She does have some right side lower extremity pain in the morning. Transverse abdominus activation helps significantly.   She was compliant with home exercise program.  Response to previous treatment: less pain  Functional change: able to stand while cooking and bend down to change baby's diaper    Pain: 5.5/10  Location: bilateral back     OBJECTIVE     Posture: slumped posture, increased kyphosis and lumbar lordosis, hinge point around TL junction  Palpation: tenderness along thoracolumbar paraspinals greatest around TL junction  Sensation: normal light touch      Range of Motion/Strength:      Lumbar AROM Pain/Dysfunction with Movement:   Flexion 50     Extension 12 Hinge at TL junction   Right side bending 20     Left side bending 25     Right rotation 90%     Left rotation 100%        Hip Right Left   AROM/PROM       flexion  Within normal  limits   Within normal limits    Internal rotation  Increased range of motion   Increased range of motion      External rotation   Increased range of motion   Increased range of motion         L/E MMT Right Left Pain/Dysfunction with Movement   Hip Flexion 4+/5 4/5     Hip Extension 4-/5 4-/5     Hip Abduction 4-/5 4-/5     Hip Adduction 4+/5 4+/5     Hip IR 4+/5 4-/5     Hip ER 4/5 4/5     Knee Flexion 4+/5 4/5     Knee Extension 4+/5 4+/5     Ankle DF 5/5 5/5     Ankle PF 4+/5 4+/5     Mid traps/low traps/ rhomboids 4-/5 bilateral      Joint Mobility:   - Thoracic: decreased lower thoracic p/a with pain  - Lumbar: decreased upper lumbar p/a with pain greatest at L4-5 region     Flexibility: normal hamstrings     30 sec sit to stand: 8 min upper extremity assist last reps   FOTO: 57% limitation  Treatment     Daya received the treatments listed in daily note     Patient Education and Home Exercises     Home Exercises Provided and Patient Education Provided     Education provided:   Anatomy and Pathology.  Symptom management and plan of care progressions.  Home Exercise Program.  Incorporation of home exercise program into daily routine.  Use of pillow for upper extremity support when nursing her baby.    Written Home Exercises Provided: Patient instructed to cont prior HEP. Exercises were reviewed and Daya was able to demonstrate them prior to the end of the session.  Daya demonstrated good  understanding of the education provided. See EMR under Patient Instructions for exercises provided during therapy sessions    ASSESSMENT     Daya is a 42 y.o. female referred to outpatient Physical Therapy with a medical diagnosis of chronic left side low back pain without sciatica, bilateral low back pain, history of  section.   Pt with 1 month gap in care early in rehab due to her child being sick. She has been to 4 visits to date since 22. She demonstrated some improvements in thoracolumbar range of motion  and improvements in right lower extremity strength since beginning PT. Improving utilization of transverse abdominus.  Ongoing bilateral (left > right) strength deficits, especially of  hips and core. These improvements have improved her tolerance for house hold chores and bending to change baby. She will continue to benefit from skilled PT with focus on thoracolumbar mobility and core stabilization and posterior chain postural strengthening.     Daya Is progressing well towards her goals.   Pt prognosis is Good.     Pt will continue to benefit from skilled outpatient physical therapy to address the deficits listed in the problem list box on initial evaluation, provide pt/family education and to maximize pt's level of independence in the home and community environment.     Pt's spiritual, cultural and educational needs considered and pt agreeable to plan of care and goals.     Anticipated barriers to physical therapy: chronicity    Goals:   Short Term Goals (4 Weeks):  1. Pt will be compliant with HEP to supplement PT in decreasing pain with functional mobility. Progressing, not met  2. Pt will perform deadbug with good control to demonstrate improved core strength. Progressing, not met  3. Pt will report no pain during thoracolumbar ROM to promote functional mobility.Progressing, not met  4. Pt will improve impaired LE MMTs to >/=4/5 to improve strength for functional tasks.Progressing, not met    Long Term Goals (8 Weeks):   1. Pt will improve FOTO score to </= 40% limited to decrease perceived limitation with maintaining/changing body position.   2. Pt will perform 20lb floor to waist lift with good control to demonstrate improved core strength for lifting child.Progressing, not met  3. Pt will improve impaired LE MMTs to >/=4+/5 to improve strength for functional tasks.Progressing, not met  4. Pt will report no pain during washing dishes and sweeping to promote house duties. Progressing, not met    PLAN      Plan of care Certification: 2/7/23-3/17/23 1x/week 5 weeks .  Continue with core stabilization program     ARPIT COFFMAN, PT

## 2023-02-14 ENCOUNTER — CLINICAL SUPPORT (OUTPATIENT)
Dept: REHABILITATION | Facility: HOSPITAL | Age: 43
End: 2023-02-14
Payer: COMMERCIAL

## 2023-02-14 DIAGNOSIS — M53.86 DECREASED ROM OF LUMBAR SPINE: Primary | ICD-10-CM

## 2023-02-14 PROCEDURE — 97110 THERAPEUTIC EXERCISES: CPT | Mod: PN

## 2023-02-14 NOTE — PROGRESS NOTES
"[facility]   Physical Therapy Treatment Note    Name: Daya Limon  Clinic Number: [id number]    Therapy Diagnosis:   Encounter Diagnosis   Name Primary?    Decreased ROM of lumbar spine Yes       Physician: Esthela Abdi MD    Visit Date: 2023    Physician Orders: PT Eval and Treat   Medical Diagnosis from Referral:   M54.50,G89.29 (ICD-10-CM) - Chronic left-sided low back pain without sciatica   Z98.891 (ICD-10-CM) - History of  section   M54.9,G89.29 (ICD-10-CM) - Chronic bilateral back pain, unspecified back location      Evaluation Date: 2022  Authorization Period Expiration: 22  Plan of Care Expiration: 3/17/23  Visit # / Visits authorized: +eval  FOTO 3:   PTA Visit #: 0/5     Time In: 10:20am (arrived late)  Time Out: 11:00 am  Total Billable Time: 40 minutes (3TE)    Precautions: Standard,  section     SUBJECTIVE     Pt reports: she is having back pain. it was worse yesterday.   She was partially compliant with home exercise program.  Response to previous treatment: less pain  Functional change: able to stand while cooking and bend down to change baby's diaper    Pain: 7/10 with activity, less at rest, not to scale  Location: bilateral back     OBJECTIVE     See objective measures 23     Treatment     Daya received the treatments listed below:      therapeutic exercises to develop strength, endurance, ROM, flexibility, posture, and core stabilization for 40 minutes including:  Supine transverse abdominus activation 20x with breath (exhale)  Transverse abdominus activation ball squeeze 2'x5"  Transverse abdominus activation with hip abduction blue theraband 2'x5"  Transverse abdominus activation march 2x1'   Transverse abdominus activation leg lowers 2x1'  Deadbug arms with march 10x bilateral    bridges 20x3"   Shoulder extension with transverse abdominus activation green theraband: 2x10  Shoulder extension march 20x bilateral " green theraband OOT   Open book 15x bilateral  Sit to stand 2x10  Clamshell green theraband with transverse abdominus activation (hand pressing into table)     Patient Education and Home Exercises     Home Exercises Provided and Patient Education Provided     Education provided:   Anatomy and Pathology.  Symptom management and plan of care progressions.  Home Exercise Program.  Incorporation of home exercise program into daily routine.  Use of pillow for upper extremity support when nursing her baby.    Written Home Exercises Provided: Patient instructed to cont prior HEP. Exercises were reviewed and Daya was able to demonstrate them prior to the end of the session.  Daya demonstrated good  understanding of the education provided. See EMR under Patient Instructions for exercises provided during therapy sessions    ASSESSMENT     Daya is a 42 y.o. female referred to outpatient Physical Therapy with a medical diagnosis of chronic left side low back pain without sciatica, bilateral low back pain, history of  section.   Today's treatment focus on thoracolumbar mobility and core stabilization and posterior chain postural strengthening. Pt challenged with progressive dynamic core stabilization exercises. Not yet able to maintain 90/90 hip position core strengthening activities without pain. Pt arrived late to session, unable to complete all previous there-ex, resume as tolerated. Pt with decrease in pain reported post treatment.     Daya Is progressing well towards her goals.   Pt prognosis is Good.     Pt will continue to benefit from skilled outpatient physical therapy to address the deficits listed in the problem list box on initial evaluation, provide pt/family education and to maximize pt's level of independence in the home and community environment.     Pt's spiritual, cultural and educational needs considered and pt agreeable to plan of care and goals.     Anticipated barriers to physical therapy:  chronicity    Goals:   Short Term Goals (4 Weeks):  1. Pt will be compliant with HEP to supplement PT in decreasing pain with functional mobility. Progressing, not met  2. Pt will perform deadbug with good control to demonstrate improved core strength. Progressing, not met  3. Pt will report no pain during thoracolumbar ROM to promote functional mobility.Progressing, not met  4. Pt will improve impaired LE MMTs to >/=4/5 to improve strength for functional tasks.Progressing, not met    Long Term Goals (8 Weeks):   1. Pt will improve FOTO score to </= 40% limited to decrease perceived limitation with maintaining/changing body position.   2. Pt will perform 20lb floor to waist lift with good control to demonstrate improved core strength for lifting child.Progressing, not met  3. Pt will improve impaired LE MMTs to >/=4+/5 to improve strength for functional tasks.Progressing, not met  4. Pt will report no pain during washing dishes and sweeping to promote house duties. Progressing, not met    PLAN     Plan of care Certification: 2/7/23-3/17/23 1x/week 5 weeks .  Continue with core stabilization program     ARPIT COFFMAN, PT

## 2023-03-03 ENCOUNTER — PATIENT MESSAGE (OUTPATIENT)
Dept: FAMILY MEDICINE | Facility: CLINIC | Age: 43
End: 2023-03-03
Payer: COMMERCIAL

## 2023-03-07 ENCOUNTER — CLINICAL SUPPORT (OUTPATIENT)
Dept: REHABILITATION | Facility: HOSPITAL | Age: 43
End: 2023-03-07
Payer: COMMERCIAL

## 2023-03-07 DIAGNOSIS — M53.86 DECREASED ROM OF LUMBAR SPINE: Primary | ICD-10-CM

## 2023-03-07 PROCEDURE — 97110 THERAPEUTIC EXERCISES: CPT | Mod: PN,CQ

## 2023-03-07 NOTE — PROGRESS NOTES
"[facility]   Physical Therapy Treatment Note    Name: Daya Limon  Clinic Number: [id number]    Therapy Diagnosis:   Encounter Diagnosis   Name Primary?    Decreased ROM of lumbar spine Yes       Physician: Esthela Abdi MD    Visit Date: 3/7/2023    Physician Orders: PT Eval and Treat   Medical Diagnosis from Referral:   M54.50,G89.29 (ICD-10-CM) - Chronic left-sided low back pain without sciatica   Z98.891 (ICD-10-CM) - History of  section   M54.9,G89.29 (ICD-10-CM) - Chronic bilateral back pain, unspecified back location      Evaluation Date: 2022  Authorization Period Expiration: 22  Plan of Care Expiration: 3/17/23  Visit # / Visits authorized: +eval  FOTO:    PTA Visit #:      Time In: 10:16 am (arrived late)  Time Out: 11:00 am  Total Billable Time: 44 minutes (3TE)    Precautions: Standard,  section     SUBJECTIVE     Pt reports: "Yesterday was a five and a half [out of ten]" Also notes increased numbness in right upper extremity, especially in the mornings.   She was partially compliant with home exercise program.  Response to previous treatment: less pain  Functional change: able to stand while cooking and bend down to change baby's diaper    Pain: 0/10 with activity, less at rest, not to scale  Location: bilateral back     OBJECTIVE     See objective measures 23     Treatment     Daya received the treatments listed below:      therapeutic exercises to develop strength, endurance, ROM, flexibility, posture, and core stabilization for 44 minutes including:  Supine transverse abdominus activation 20x with breath (exhale)  Transverse abdominus activation ball squeeze 2'x5"  Transverse abdominus activation with hip abduction blue theraband 2'x5"  Transverse abdominus activation march 2x1'   Transverse abdominus activation leg lowers 2x1'  Deadbug arms with march 10x bilateral    bridges 20x3"   Shoulder extension with transverse " abdominus activation green theraband: 2x10  Shoulder extension marches 20x bilateral green theraband OOT   Open book 15x bilateral  Sit to stand 2x10  Clamshell green theraband with transverse abdominus activation (hand pressing into table)  Seated lumbar flexion with peanut 2 x 10     Patient Education and Home Exercises     Home Exercises Provided and Patient Education Provided     Education provided:   Anatomy and Pathology.  Symptom management and plan of care progressions.  Home Exercise Program.  Incorporation of home exercise program into daily routine.  Use of pillow for upper extremity support when nursing her baby.    Written Home Exercises Provided: Patient instructed to cont prior HEP. Exercises were reviewed and Daya was able to demonstrate them prior to the end of the session.  Daya demonstrated good  understanding of the education provided. See EMR under Patient Instructions for exercises provided during therapy sessions    ASSESSMENT     Daya is a 42 y.o. female referred to outpatient Physical Therapy with a medical diagnosis of chronic left side low back pain without sciatica, bilateral low back pain, history of  section.   Today's treatment continued to focus on thoracolumbar mobility and core stabilization and posterior chain postural strengthening. Good response to seated lumbar flexion reporting decreased lumbar pain afterward. Will continue to progress as appropriate.     Daya Is progressing well towards her goals.   Pt prognosis is Good.     Pt will continue to benefit from skilled outpatient physical therapy to address the deficits listed in the problem list box on initial evaluation, provide pt/family education and to maximize pt's level of independence in the home and community environment.     Pt's spiritual, cultural and educational needs considered and pt agreeable to plan of care and goals.     Anticipated barriers to physical therapy: chronicity    Goals:   Short Term Goals  (4 Weeks):  1. Pt will be compliant with HEP to supplement PT in decreasing pain with functional mobility. Progressing, not met  2. Pt will perform deadbug with good control to demonstrate improved core strength. Progressing, not met  3. Pt will report no pain during thoracolumbar ROM to promote functional mobility.Progressing, not met  4. Pt will improve impaired LE MMTs to >/=4/5 to improve strength for functional tasks.Progressing, not met    Long Term Goals (8 Weeks):   1. Pt will improve FOTO score to </= 40% limited to decrease perceived limitation with maintaining/changing body position.   2. Pt will perform 20lb floor to waist lift with good control to demonstrate improved core strength for lifting child.Progressing, not met  3. Pt will improve impaired LE MMTs to >/=4+/5 to improve strength for functional tasks.Progressing, not met  4. Pt will report no pain during washing dishes and sweeping to promote house duties. Progressing, not met    PLAN     Plan of care Certification: 2/7/23-3/17/23 1x/week 5 weeks .  Continue with core stabilization program     Jammie Stewart, PTA

## 2023-04-05 ENCOUNTER — DOCUMENTATION ONLY (OUTPATIENT)
Dept: REHABILITATION | Facility: HOSPITAL | Age: 43
End: 2023-04-05
Payer: COMMERCIAL

## 2023-04-05 NOTE — PROGRESS NOTES
Physical therapist and physical therapy assistant(s) met face to face to discuss patient's treatment plan and progress towards established goals. Pt will be seen by a physical therapist minimally every 6th visit or every 30 days.    ARPIT COFFMAN, PT, DPT    Lisha Gonzalez, PTA    Jammie Stewart, PTA

## 2023-06-06 ENCOUNTER — PATIENT MESSAGE (OUTPATIENT)
Dept: FAMILY MEDICINE | Facility: CLINIC | Age: 43
End: 2023-06-06

## 2023-06-06 ENCOUNTER — TELEPHONE (OUTPATIENT)
Dept: FAMILY MEDICINE | Facility: CLINIC | Age: 43
End: 2023-06-06

## 2023-06-06 ENCOUNTER — E-VISIT (OUTPATIENT)
Dept: FAMILY MEDICINE | Facility: CLINIC | Age: 43
End: 2023-06-06
Payer: COMMERCIAL

## 2023-06-06 DIAGNOSIS — R39.9 UTI SYMPTOMS: Primary | ICD-10-CM

## 2023-06-06 PROCEDURE — 99499 NO LOS: ICD-10-PCS | Mod: ,,, | Performed by: FAMILY MEDICINE

## 2023-06-06 PROCEDURE — 99499 UNLISTED E&M SERVICE: CPT | Mod: ,,, | Performed by: FAMILY MEDICINE

## 2023-06-06 NOTE — PROGRESS NOTES
Patient ID: Daya Limon is a 43 y.o. female.    Chief Complaint: Urinary Tract Infection (Moderate dysuria, hematuria, frequency/urgency since yesterday. No significant consitutional symptoms like fever. Currently breast feeding. )    The patient initiated a request through [employer] on 6/6/2023 for evaluation and management with a chief complaint of Urinary Tract Infection (Moderate dysuria, hematuria, frequency/urgency since yesterday. No significant consitutional symptoms like fever. Currently breast feeding. )     I evaluated the questionnaire submission on 6/6/23.    [facility] Peq Evisit Uti Questionnaire    6/6/2023  3:16 PM CDT - Filed by Patient   Do you agree to participate in an E-Visit? Yes   If you have any of the following problems, please present to your local ER or call 911:  I acknowledge   What is the main issue that you would like for your doctor to address today? Urine color reddish and burning sensation after peeing   Are you able to take your vital signs? No   Are you currently pregnant, could you be pregnant, or are you breast feeding? Breast feeding   What symptoms do you currently have? Pain while passing urine;  Change in urine appearance or smell   When did your symptoms first appear? 6/5/2023   List what you have done or taken to help your symptoms. Increase water intake   Please indicate whether you have had the following symptoms during the past 24 hours     Urgent urination (a sudden and uncontrollable urge to urinate) Moderate   Frequent urination of small amounts of urine (going to the toilet very often) Moderate   Burning pain when urinating Moderate   Incomplete bladder emptying (still feel like you need to urinate again after urination) Mild   Pain not associated with urination in the lower abdomen below the belly button) None   What does your urine look like? I am not sure   Blood seen in the urine Moderate   Flank pain (pain in one or both sides of the lower back) Mild   Abnormal  Vaginal Discharge (abnormal amount, color and/or odor) Mild   Discharge from the urethra (urinary opening) without urination None   High body temperature/fever? None-<99.5   Please rate how much discomfort you have experience because of the symptoms in the past 24 hours: Moderate   Please indicate how the symptoms have interfered with your every day activities/work in the past 24 hours: Moderate   Please indicate how these symptoms have interfered with your social activities (visiting people, meeting with friends, etc.) in the past 24 hours? Mild   Are you a diabetic? No   Please indicate whether you have the following at the time of completion of this questionnaire: None of the above   Provide any information you feel is important to your history not asked above Burning sensation after peeing   Please attach any relevant images or files (if you have performed a home test for UTI, please submit a photo of results)          Recent Labs Obtained:  No visits with results within 7 Day(s) from this visit.   Latest known visit with results is:   Lab Visit on 2022   Component Date Value Ref Range Status    TSH 2022 3.761  0.400 - 4.000 uIU/mL Final    Thyroperoxidase Antibodies 2022 286.9 (H)  <6.0 IU/mL Final       Encounter Diagnosis   Name Primary?    UTI symptoms Yes        Orders Placed This Encounter   Procedures    Urine culture     Standing Status:   Future     Standing Expiration Date:   2024    Urinalysis     Standing Status:   Future     Standing Expiration Date:   2024     Order Specific Question:   Collection Type     Answer:   Urine, Clean Catch       Symptoms c/w uncomplicated cystitis. No recent documentation of UTI on file.   Will have patient leave specimen for UA/culture.   Advise treatment based on preliminary UA results.     Follow up for to follow up on lab results.      E-Visit Time Trackin minutes.

## 2023-06-07 ENCOUNTER — LAB VISIT (OUTPATIENT)
Dept: LAB | Facility: HOSPITAL | Age: 43
End: 2023-06-07
Payer: COMMERCIAL

## 2023-06-07 DIAGNOSIS — R39.9 UTI SYMPTOMS: ICD-10-CM

## 2023-06-07 LAB
BILIRUB UR QL STRIP: NEGATIVE
CLARITY UR: CLEAR
COLOR UR: COLORLESS
GLUCOSE UR QL STRIP: NEGATIVE
HGB UR QL STRIP: NEGATIVE
KETONES UR QL STRIP: NEGATIVE
LEUKOCYTE ESTERASE UR QL STRIP: NEGATIVE
NITRITE UR QL STRIP: NEGATIVE
PH UR STRIP: 6 [PH] (ref 5–8)
PROT UR QL STRIP: NEGATIVE
SP GR UR STRIP: 1.01 (ref 1–1.03)
URN SPEC COLLECT METH UR: ABNORMAL
UROBILINOGEN UR STRIP-ACNC: NEGATIVE EU/DL

## 2023-06-07 PROCEDURE — 87086 URINE CULTURE/COLONY COUNT: CPT | Performed by: FAMILY MEDICINE

## 2023-06-07 PROCEDURE — 81003 URINALYSIS AUTO W/O SCOPE: CPT | Performed by: FAMILY MEDICINE

## 2023-06-08 LAB — BACTERIA UR CULT: NO GROWTH

## 2023-06-12 ENCOUNTER — OFFICE VISIT (OUTPATIENT)
Dept: FAMILY MEDICINE | Facility: CLINIC | Age: 43
End: 2023-06-12
Payer: COMMERCIAL

## 2023-06-12 VITALS
WEIGHT: 153 LBS | BODY MASS INDEX: 28.89 KG/M2 | SYSTOLIC BLOOD PRESSURE: 114 MMHG | OXYGEN SATURATION: 98 % | HEART RATE: 86 BPM | TEMPERATURE: 99 F | DIASTOLIC BLOOD PRESSURE: 76 MMHG | HEIGHT: 61 IN

## 2023-06-12 DIAGNOSIS — N94.9 VAGINAL BURNING: ICD-10-CM

## 2023-06-12 DIAGNOSIS — R39.9 UTI SYMPTOMS: Primary | ICD-10-CM

## 2023-06-12 DIAGNOSIS — R06.2 NOCTURNAL COUGH WITH WHEEZE: ICD-10-CM

## 2023-06-12 DIAGNOSIS — M54.9 CHRONIC BILATERAL BACK PAIN, UNSPECIFIED BACK LOCATION: ICD-10-CM

## 2023-06-12 DIAGNOSIS — M77.8 RIGHT WRIST TENDINITIS: ICD-10-CM

## 2023-06-12 DIAGNOSIS — J30.2 SEASONAL ALLERGIES: ICD-10-CM

## 2023-06-12 DIAGNOSIS — R05.8 NOCTURNAL COUGH WITH WHEEZE: ICD-10-CM

## 2023-06-12 DIAGNOSIS — J98.01 COUGH DUE TO BRONCHOSPASM: ICD-10-CM

## 2023-06-12 DIAGNOSIS — K21.9 GASTROESOPHAGEAL REFLUX DISEASE, UNSPECIFIED WHETHER ESOPHAGITIS PRESENT: ICD-10-CM

## 2023-06-12 DIAGNOSIS — G89.29 CHRONIC BILATERAL BACK PAIN, UNSPECIFIED BACK LOCATION: ICD-10-CM

## 2023-06-12 DIAGNOSIS — M77.01 MEDIAL EPICONDYLITIS OF ELBOW, RIGHT: ICD-10-CM

## 2023-06-12 PROCEDURE — 1159F PR MEDICATION LIST DOCUMENTED IN MEDICAL RECORD: ICD-10-PCS | Mod: CPTII,S$GLB,, | Performed by: FAMILY MEDICINE

## 2023-06-12 PROCEDURE — 3074F PR MOST RECENT SYSTOLIC BLOOD PRESSURE < 130 MM HG: ICD-10-PCS | Mod: CPTII,S$GLB,, | Performed by: FAMILY MEDICINE

## 2023-06-12 PROCEDURE — 1159F MED LIST DOCD IN RCRD: CPT | Mod: CPTII,S$GLB,, | Performed by: FAMILY MEDICINE

## 2023-06-12 PROCEDURE — 81514 NFCT DS BV&VAGINITIS DNA ALG: CPT | Performed by: FAMILY MEDICINE

## 2023-06-12 PROCEDURE — 99999 PR PBB SHADOW E&M-EST. PATIENT-LVL IV: CPT | Mod: PBBFAC,,, | Performed by: FAMILY MEDICINE

## 2023-06-12 PROCEDURE — 1160F PR REVIEW ALL MEDS BY PRESCRIBER/CLIN PHARMACIST DOCUMENTED: ICD-10-PCS | Mod: CPTII,S$GLB,, | Performed by: FAMILY MEDICINE

## 2023-06-12 PROCEDURE — 3074F SYST BP LT 130 MM HG: CPT | Mod: CPTII,S$GLB,, | Performed by: FAMILY MEDICINE

## 2023-06-12 PROCEDURE — 3008F PR BODY MASS INDEX (BMI) DOCUMENTED: ICD-10-PCS | Mod: CPTII,S$GLB,, | Performed by: FAMILY MEDICINE

## 2023-06-12 PROCEDURE — 3078F PR MOST RECENT DIASTOLIC BLOOD PRESSURE < 80 MM HG: ICD-10-PCS | Mod: CPTII,S$GLB,, | Performed by: FAMILY MEDICINE

## 2023-06-12 PROCEDURE — 3078F DIAST BP <80 MM HG: CPT | Mod: CPTII,S$GLB,, | Performed by: FAMILY MEDICINE

## 2023-06-12 PROCEDURE — 99999 PR PBB SHADOW E&M-EST. PATIENT-LVL IV: ICD-10-PCS | Mod: PBBFAC,,, | Performed by: FAMILY MEDICINE

## 2023-06-12 PROCEDURE — 81003 URINALYSIS AUTO W/O SCOPE: CPT | Performed by: FAMILY MEDICINE

## 2023-06-12 PROCEDURE — 3008F BODY MASS INDEX DOCD: CPT | Mod: CPTII,S$GLB,, | Performed by: FAMILY MEDICINE

## 2023-06-12 PROCEDURE — 99214 PR OFFICE/OUTPT VISIT, EST, LEVL IV, 30-39 MIN: ICD-10-PCS | Mod: S$GLB,,, | Performed by: FAMILY MEDICINE

## 2023-06-12 PROCEDURE — 99214 OFFICE O/P EST MOD 30 MIN: CPT | Mod: S$GLB,,, | Performed by: FAMILY MEDICINE

## 2023-06-12 PROCEDURE — 87086 URINE CULTURE/COLONY COUNT: CPT | Performed by: FAMILY MEDICINE

## 2023-06-12 PROCEDURE — 1160F RVW MEDS BY RX/DR IN RCRD: CPT | Mod: CPTII,S$GLB,, | Performed by: FAMILY MEDICINE

## 2023-06-12 RX ORDER — ALBUTEROL SULFATE 90 UG/1
AEROSOL, METERED RESPIRATORY (INHALATION)
Qty: 18 G | Refills: 11 | Status: SHIPPED | OUTPATIENT
Start: 2023-06-12 | End: 2023-06-13 | Stop reason: CLARIF

## 2023-06-12 RX ORDER — LEVOCETIRIZINE DIHYDROCHLORIDE 5 MG/1
5 TABLET, FILM COATED ORAL NIGHTLY
Qty: 30 TABLET | Refills: 11 | Status: SHIPPED | OUTPATIENT
Start: 2023-06-12 | End: 2024-06-11

## 2023-06-12 RX ORDER — PANTOPRAZOLE SODIUM 40 MG/1
40 TABLET, DELAYED RELEASE ORAL
Qty: 90 TABLET | Refills: 4 | Status: SHIPPED | OUTPATIENT
Start: 2023-06-12 | End: 2024-06-11

## 2023-06-12 RX ORDER — DICLOFENAC SODIUM 10 MG/G
4 GEL TOPICAL 4 TIMES DAILY PRN
Qty: 100 G | Refills: 11 | Status: SHIPPED | OUTPATIENT
Start: 2023-06-12

## 2023-06-12 NOTE — PROGRESS NOTES
Office Visit    Patient Name: Daya Limon    : 1980  MRN: [id number]    Subjective:  Daya is a 43 y.o. female who presents today for:    urine (Red color urine, burning sensation, started the )    Seen by me for annual physical 10/21/2022   Seen by allergy immunology 10/31/2022-chronic cough thought to be more likely related to GERD-advise GI follow-up, continue antihistamine, Flonase for allergic rhinitis symptoms   Seen by GI 11/3/23  EGD. Consider biopsy for H.pylori  Restart Protonix 40mg daily. Continue for Protonix for 3-4 months then consider decreasing to 20mg daily. If tolerates, 20mg daily for 3-4 months, then consider discontinuing as patient prefers to not take medication.     Had EGD 22  Biopsies negative, 1 cm hiatal hernia noted, advised to continue PPI.      Was going to PT for back pain, which was helping, but didn't go long enough due to Ramadan starting   Back pain starting to worsen and she requests repeat referral  Also having some right medial elbow, forearm and wrist pain--ov not she frequently sleeps cradling her nursing daughter  Cannot tolerate NSAIDS 2/2 GERD    Today she presents with ongoing UTI type symptoms/ UTI concerns.     She reports burning after urination, potentially noticed some blood on 23.     Urinalysis 2023 was unremarkable with no growth on urine culture.      She had eaten beat root.     Periods are regular.    Has 1.4 yo baby and is nursing.     Some issue initially with bladder emptying but this has improved    No concerning vaginal discharge.     Leaks urine slightly  with cough.               PAST MEDICAL HISTORY, SURGICAL/SOCIAL/FAMILY HISTORY REVIEWED AS PER CHART, WITH PERTINENT FINDINGS INCLUDED IN HISTORY SECTION OF NOTE.     Current Medications    Medication List with Changes/Refills   Current Medications    AZELASTINE (ASTELIN) 137 MCG (0.1 %) NASAL SPRAY    1 spray (137 mcg total) by Nasal route 2 (two) times daily.     CHOLECALCIFEROL, VITAMIN D3, 125 MCG (5,000 UNIT) TAB    Take 1 tablet (5,000 Units total) by mouth daily with breakfast.    DEXTROMETHORPHAN HBR 2.5 MG LOZG    Use ase directed for cough    FLUTICASONE PROPIONATE (FLONASE) 50 MCG/ACTUATION NASAL SPRAY    2 sprays (100 mcg total) by Each Nostril route once daily. Take daily as needed for nasal congestion    IBUPROFEN (ADVIL,MOTRIN) 600 MG TABLET    Take 1 tablet (600 mg total) by mouth every 6 (six) hours as needed for Pain.    KETOCONAZOLE (NIZORAL) 2 % SHAMPOO    Apply topically twice a week.    PNV,CALCIUM 72-IRON-FOLIC ACID (WESTAB PLUS) 27 MG IRON- 1 MG TAB    Take 1 tablet (1 each total) by mouth once daily.    PRENATAL VIT 28-IRON FUM-FOLIC 27 MG IRON- 1 MG TAB    Take 1 tablet by mouth daily with breakfast.    TRIAMCINOLONE ACETONIDE 0.1% (KENALOG) 0.1 % OINTMENT    Apply topically 2 (two) times daily.   Changed and/or Refilled Medications    Modified Medication Previous Medication    ALBUTEROL (PROVENTIL/VENTOLIN HFA) 90 MCG/ACTUATION INHALER albuterol (PROVENTIL/VENTOLIN HFA) 90 mcg/actuation inhaler       INHALE 2 PUFFS INTO THE LUNGS EVERY 6 HOURS AS NEEDED FOR WHEEZING OR RESCUE    INHALE 2 PUFFS INTO THE LUNGS EVERY 6 HOURS AS NEEDED FOR WHEEZING OR RESCUE    DICLOFENAC SODIUM (VOLTAREN) 1 % GEL diclofenac sodium (VOLTAREN) 1 % Gel       Apply 4 g topically 4 (four) times daily as needed (joint pain).    Apply 2 g topically 4 (four) times daily as needed.    LEVOCETIRIZINE (XYZAL) 5 MG TABLET levocetirizine (XYZAL) 5 MG tablet       Take 1 tablet (5 mg total) by mouth every evening.    Take 1 tablet (5 mg total) by mouth every evening.    PANTOPRAZOLE (PROTONIX) 40 MG TABLET pantoprazole (PROTONIX) 40 MG tablet       Take 1 tablet (40 mg total) by mouth before breakfast.    Take 1 tablet (40 mg total) by mouth before breakfast.   Discontinued Medications    DOCUSATE SODIUM (COLACE) 100 MG CAPSULE    Take 1 capsule (100 mg total) by mouth 2 (two) times  "daily as needed for Constipation.       Allergies   Review of patient's allergies indicates:  No Known Allergies      Review of Systems (Pertinent positives)  Review of Systems   Respiratory:  Positive for wheezing (intermittent w/ allergy /reflux).    Genitourinary:  Positive for dysuria (burning AFTER urination) and frequency.   Musculoskeletal:  Positive for arthralgias and back pain.   Allergic/Immunologic: Positive for environmental allergies.     /76 (BP Location: Right arm, Patient Position: Sitting, BP Method: Medium (Manual))   Pulse 86   Temp 99.2 °F (37.3 °C) (Oral)   Ht 5' 1" (1.549 m)   Wt 69.4 kg (153 lb)   LMP  (LMP Unknown)   SpO2 98%   BMI 28.91 kg/m²     Physical Exam  Vitals reviewed.   Constitutional:       General: She is not in acute distress.     Appearance: Normal appearance. She is well-developed.   HENT:      Head: Normocephalic and atraumatic.   Eyes:      Conjunctiva/sclera: Conjunctivae normal.   Cardiovascular:      Rate and Rhythm: Normal rate and regular rhythm.   Pulmonary:      Effort: Pulmonary effort is normal.      Breath sounds: Normal breath sounds.   Genitourinary:     General: Normal vulva.      Labia:         Right: No lesion.         Left: No lesion.       Urethra: No prolapse or urethral lesion.      Vagina: Vaginal discharge (mild- physiologic in appearance) present.      Cervix: Normal.      Uterus: With uterine prolapse (mild).       Comments: Mild cystocele/POP  Musculoskeletal:      Right elbow: Tenderness present in medial epicondyle.      Right forearm: Tenderness present.      Right wrist: Tenderness (and pain with resisted wrist flexion) present. No swelling.      Right lower leg: No edema.      Left lower leg: No edema.   Skin:     General: Skin is warm and dry.   Neurological:      General: No focal deficit present.      Mental Status: She is alert and oriented to person, place, and time.   Psychiatric:         Mood and Affect: Mood normal.         " Behavior: Behavior normal.         Assessment/Plan:  Daya Limon is a 43 y.o. female who presents today for :        ICD-10-CM ICD-9-CM    1. UTI symptoms  R39.9 788.99 Urinalysis      Urine culture      2. Vaginal burning  N94.9 625.8 VAGINOSIS SCREEN BY DNA PROBE      3. Chronic bilateral back pain, unspecified back location  M54.9 724.5 Ambulatory referral/consult to Physical/Occupational Therapy    G89.29 338.29       4. Medial epicondylitis of elbow, right  M77.01 726.31 Ambulatory referral/consult to Physical/Occupational Therapy      diclofenac sodium (VOLTAREN) 1 % Gel      5. Right wrist tendinitis  M77.8 727.05 Ambulatory referral/consult to Physical/Occupational Therapy      diclofenac sodium (VOLTAREN) 1 % Gel      6. Gastroesophageal reflux disease, unspecified whether esophagitis present  K21.9 530.81 pantoprazole (PROTONIX) 40 MG tablet      7. Nocturnal cough with wheeze  R05.8 786.2 albuterol (PROVENTIL/VENTOLIN HFA) 90 mcg/actuation inhaler    R06.2 786.07       8. Cough due to bronchospasm  J98.01 519.11 albuterol (PROVENTIL/VENTOLIN HFA) 90 mcg/actuation inhaler      9. Seasonal allergies  J30.2 477.9 levocetirizine (XYZAL) 5 MG tablet        UTI SX/ VAGINAL BURNING: recheck urine, Affirm testing today for yeast/BV but no overt evidence of infection on exam.     MOHIT: MILD POP ON EXAM, would likely benefit from pelvic floor PT    MEDIAL EPICONDYLITIS, R FOREARM PAIN, R WRIST PAIN, H/O CHRONIC BACK PAIN: Multifactorial and positional 2/2 repetitive movements & picking up toddler and sleeping with her. Advised Voltaren gel, keeping arms/ wrists/hands more frequently miah neutral position-- consider splinting at night and mild suport brace during day. PT referral to address this with Back Pain--PT was helping her back pain.     GERD/AR W/ WHEEZING: following with allergy and GI to try to gain better control of her CLAUDINE/AR and coughing. Still has some wheezing episodes for which she takes albuterol  inhaler with benfit          There are no Patient Instructions on file for this visit.      Follow up for to follow up on lab results, return as needed for new concerns.

## 2023-06-13 ENCOUNTER — TELEPHONE (OUTPATIENT)
Dept: FAMILY MEDICINE | Facility: CLINIC | Age: 43
End: 2023-06-13
Payer: COMMERCIAL

## 2023-06-13 LAB
BACTERIA UR CULT: NO GROWTH
BACTERIAL VAGINOSIS DNA: NEGATIVE
BILIRUB UR QL STRIP: NEGATIVE
CANDIDA GLABRATA DNA: NEGATIVE
CANDIDA KRUSEI DNA: NEGATIVE
CANDIDA RRNA VAG QL PROBE: NEGATIVE
CLARITY UR REFRACT.AUTO: CLEAR
COLOR UR AUTO: COLORLESS
GLUCOSE UR QL STRIP: NEGATIVE
HGB UR QL STRIP: NEGATIVE
KETONES UR QL STRIP: NEGATIVE
LEUKOCYTE ESTERASE UR QL STRIP: NEGATIVE
NITRITE UR QL STRIP: NEGATIVE
PH UR STRIP: 6 [PH] (ref 5–8)
PROT UR QL STRIP: NEGATIVE
SP GR UR STRIP: 1 (ref 1–1.03)
T VAGINALIS RRNA GENITAL QL PROBE: NEGATIVE
URN SPEC COLLECT METH UR: ABNORMAL

## 2023-06-13 RX ORDER — ALBUTEROL SULFATE 90 UG/1
2 AEROSOL, METERED RESPIRATORY (INHALATION) EVERY 6 HOURS PRN
Qty: 18 G | Refills: 11 | Status: SHIPPED | OUTPATIENT
Start: 2023-06-13 | End: 2024-06-12

## 2023-09-27 ENCOUNTER — TELEPHONE (OUTPATIENT)
Dept: FAMILY MEDICINE | Facility: CLINIC | Age: 43
End: 2023-09-27
Payer: COMMERCIAL

## 2023-09-27 DIAGNOSIS — J02.0 STREP PHARYNGITIS: ICD-10-CM

## 2023-09-27 DIAGNOSIS — J10.1 INFLUENZA B: Primary | ICD-10-CM

## 2023-09-27 RX ORDER — AMOXICILLIN 875 MG/1
875 TABLET, FILM COATED ORAL EVERY 12 HOURS
Qty: 20 TABLET | Refills: 0 | Status: SHIPPED | OUTPATIENT
Start: 2023-09-27 | End: 2023-10-27 | Stop reason: ALTCHOICE

## 2023-09-27 RX ORDER — OSELTAMIVIR PHOSPHATE 75 MG/1
75 CAPSULE ORAL 2 TIMES DAILY
Qty: 10 CAPSULE | Refills: 0 | Status: SHIPPED | OUTPATIENT
Start: 2023-09-27 | End: 2023-10-02

## 2023-09-27 NOTE — TELEPHONE ENCOUNTER
Pt stated that she has sore throat and flu-like symptoms. Pt came to the office for a strep and flu test. Pt tested positive for Flu B and strep. Pls advise on medication.

## 2023-10-12 ENCOUNTER — PATIENT MESSAGE (OUTPATIENT)
Dept: FAMILY MEDICINE | Facility: CLINIC | Age: 43
End: 2023-10-12
Payer: COMMERCIAL

## 2023-10-12 DIAGNOSIS — L21.9 SEBORRHEIC DERMATITIS: ICD-10-CM

## 2023-10-12 DIAGNOSIS — H01.009 SEBORRHEIC BLEPHARITIS, UNSPECIFIED LATERALITY: ICD-10-CM

## 2023-10-14 RX ORDER — KETOCONAZOLE 20 MG/ML
SHAMPOO, SUSPENSION TOPICAL
Qty: 120 ML | Refills: 11 | Status: SHIPPED | OUTPATIENT
Start: 2023-10-16

## 2023-10-20 ENCOUNTER — OFFICE VISIT (OUTPATIENT)
Dept: OPTOMETRY | Facility: CLINIC | Age: 43
End: 2023-10-20
Payer: COMMERCIAL

## 2023-10-20 DIAGNOSIS — H52.203 HYPEROPIC ASTIGMATISM, BILATERAL: ICD-10-CM

## 2023-10-20 DIAGNOSIS — R51.9 HEADACHE ABOVE THE EYE REGION: Primary | ICD-10-CM

## 2023-10-20 PROCEDURE — 99999 PR PBB SHADOW E&M-EST. PATIENT-LVL III: ICD-10-PCS | Mod: PBBFAC,,, | Performed by: OPTOMETRIST

## 2023-10-20 PROCEDURE — 1159F PR MEDICATION LIST DOCUMENTED IN MEDICAL RECORD: ICD-10-PCS | Mod: CPTII,S$GLB,, | Performed by: OPTOMETRIST

## 2023-10-20 PROCEDURE — 92015 DETERMINE REFRACTIVE STATE: CPT | Mod: S$GLB,,, | Performed by: OPTOMETRIST

## 2023-10-20 PROCEDURE — 92015 PR REFRACTION: ICD-10-PCS | Mod: S$GLB,,, | Performed by: OPTOMETRIST

## 2023-10-20 PROCEDURE — 1159F MED LIST DOCD IN RCRD: CPT | Mod: CPTII,S$GLB,, | Performed by: OPTOMETRIST

## 2023-10-20 PROCEDURE — 92014 COMPRE OPH EXAM EST PT 1/>: CPT | Mod: S$GLB,,, | Performed by: OPTOMETRIST

## 2023-10-20 PROCEDURE — 99999 PR PBB SHADOW E&M-EST. PATIENT-LVL III: CPT | Mod: PBBFAC,,, | Performed by: OPTOMETRIST

## 2023-10-20 PROCEDURE — 92014 PR EYE EXAM, EST PATIENT,COMPREHESV: ICD-10-PCS | Mod: S$GLB,,, | Performed by: OPTOMETRIST

## 2023-10-20 NOTE — PROGRESS NOTES
HPI    Annual Exam   Pt states vision is Near is poor   Pt reports when reading gets HA's    Pt denies F/F   Pt denies Dry/ Itchy/ Burning  Gtt: Yes Refresh   Pt reports relief    Last edited by Sona Slade on 10/20/2023  9:52 AM.            Assessment /Plan     For exam results, see Encounter Report.    Headache above the eye region  -Undercorrected Hyperopia    Hyperopic astigmatism, bilateral  Eyeglass Final Rx       Eyeglass Final Rx         Sphere Cylinder Axis Dist VA    Right +1.50 +0.75 105 20/20    Left +1.75 +0.75 070 20/20                      RTC 1 yr

## 2023-10-27 ENCOUNTER — OFFICE VISIT (OUTPATIENT)
Dept: FAMILY MEDICINE | Facility: CLINIC | Age: 43
End: 2023-10-27
Payer: COMMERCIAL

## 2023-10-27 ENCOUNTER — LAB VISIT (OUTPATIENT)
Dept: LAB | Facility: HOSPITAL | Age: 43
End: 2023-10-27
Payer: COMMERCIAL

## 2023-10-27 VITALS
BODY MASS INDEX: 29.08 KG/M2 | DIASTOLIC BLOOD PRESSURE: 64 MMHG | SYSTOLIC BLOOD PRESSURE: 106 MMHG | HEART RATE: 88 BPM | OXYGEN SATURATION: 99 % | TEMPERATURE: 99 F | WEIGHT: 153.88 LBS

## 2023-10-27 DIAGNOSIS — Z00.00 ROUTINE GENERAL MEDICAL EXAMINATION AT A HEALTH CARE FACILITY: Primary | ICD-10-CM

## 2023-10-27 DIAGNOSIS — E55.9 VITAMIN D DEFICIENCY: ICD-10-CM

## 2023-10-27 DIAGNOSIS — R05.3 CHRONIC COUGH: ICD-10-CM

## 2023-10-27 DIAGNOSIS — M54.9 CHRONIC BILATERAL BACK PAIN, UNSPECIFIED BACK LOCATION: ICD-10-CM

## 2023-10-27 DIAGNOSIS — Z00.00 ROUTINE GENERAL MEDICAL EXAMINATION AT A HEALTH CARE FACILITY: ICD-10-CM

## 2023-10-27 DIAGNOSIS — J30.2 SEASONAL ALLERGIES: ICD-10-CM

## 2023-10-27 DIAGNOSIS — M77.01 MEDIAL EPICONDYLITIS, RIGHT ELBOW: ICD-10-CM

## 2023-10-27 DIAGNOSIS — R76.8 ANTI-TPO ANTIBODIES PRESENT: ICD-10-CM

## 2023-10-27 DIAGNOSIS — M25.521 RIGHT ELBOW PAIN: ICD-10-CM

## 2023-10-27 DIAGNOSIS — Z98.891 HISTORY OF CESAREAN SECTION: ICD-10-CM

## 2023-10-27 DIAGNOSIS — E66.3 OVERWEIGHT (BMI 25.0-29.9): ICD-10-CM

## 2023-10-27 DIAGNOSIS — K21.9 GASTROESOPHAGEAL REFLUX DISEASE, UNSPECIFIED WHETHER ESOPHAGITIS PRESENT: ICD-10-CM

## 2023-10-27 DIAGNOSIS — Z23 NEEDS FLU SHOT: ICD-10-CM

## 2023-10-27 DIAGNOSIS — Z12.31 ENCOUNTER FOR SCREENING MAMMOGRAM FOR BREAST CANCER: ICD-10-CM

## 2023-10-27 DIAGNOSIS — M77.8 RIGHT HAND TENDONITIS: ICD-10-CM

## 2023-10-27 DIAGNOSIS — G89.29 CHRONIC BILATERAL BACK PAIN, UNSPECIFIED BACK LOCATION: ICD-10-CM

## 2023-10-27 DIAGNOSIS — M77.11 LATERAL EPICONDYLITIS OF RIGHT ELBOW: ICD-10-CM

## 2023-10-27 LAB
25(OH)D3+25(OH)D2 SERPL-MCNC: 32 NG/ML (ref 30–96)
ALBUMIN SERPL BCP-MCNC: 4.3 G/DL (ref 3.5–5.2)
ALP SERPL-CCNC: 77 U/L (ref 55–135)
ALT SERPL W/O P-5'-P-CCNC: 27 U/L (ref 10–44)
ANION GAP SERPL CALC-SCNC: 9 MMOL/L (ref 8–16)
AST SERPL-CCNC: 31 U/L (ref 10–40)
BASOPHILS # BLD AUTO: 0.04 K/UL (ref 0–0.2)
BASOPHILS NFR BLD: 0.5 % (ref 0–1.9)
BILIRUB SERPL-MCNC: 0.4 MG/DL (ref 0.1–1)
BUN SERPL-MCNC: 11 MG/DL (ref 6–20)
CALCIUM SERPL-MCNC: 10.2 MG/DL (ref 8.7–10.5)
CHLORIDE SERPL-SCNC: 104 MMOL/L (ref 95–110)
CHOLEST SERPL-MCNC: 163 MG/DL (ref 120–199)
CHOLEST/HDLC SERPL: 3 {RATIO} (ref 2–5)
CO2 SERPL-SCNC: 25 MMOL/L (ref 23–29)
CREAT SERPL-MCNC: 0.6 MG/DL (ref 0.5–1.4)
DIFFERENTIAL METHOD: NORMAL
EOSINOPHIL # BLD AUTO: 0.3 K/UL (ref 0–0.5)
EOSINOPHIL NFR BLD: 3.4 % (ref 0–8)
ERYTHROCYTE [DISTWIDTH] IN BLOOD BY AUTOMATED COUNT: 13.9 % (ref 11.5–14.5)
EST. GFR  (NO RACE VARIABLE): >60 ML/MIN/1.73 M^2
ESTIMATED AVG GLUCOSE: 114 MG/DL (ref 68–131)
FERRITIN SERPL-MCNC: 62 NG/ML (ref 20–300)
GLUCOSE SERPL-MCNC: 95 MG/DL (ref 70–110)
HBA1C MFR BLD: 5.6 % (ref 4–5.6)
HCT VFR BLD AUTO: 41.4 % (ref 37–48.5)
HDLC SERPL-MCNC: 54 MG/DL (ref 40–75)
HDLC SERPL: 33.1 % (ref 20–50)
HGB BLD-MCNC: 13.5 G/DL (ref 12–16)
IMM GRANULOCYTES # BLD AUTO: 0.01 K/UL (ref 0–0.04)
IMM GRANULOCYTES NFR BLD AUTO: 0.1 % (ref 0–0.5)
LDLC SERPL CALC-MCNC: 91.2 MG/DL (ref 63–159)
LYMPHOCYTES # BLD AUTO: 2.3 K/UL (ref 1–4.8)
LYMPHOCYTES NFR BLD: 28.5 % (ref 18–48)
MAGNESIUM SERPL-MCNC: 2 MG/DL (ref 1.6–2.6)
MCH RBC QN AUTO: 28.4 PG (ref 27–31)
MCHC RBC AUTO-ENTMCNC: 32.6 G/DL (ref 32–36)
MCV RBC AUTO: 87 FL (ref 82–98)
MONOCYTES # BLD AUTO: 0.5 K/UL (ref 0.3–1)
MONOCYTES NFR BLD: 5.7 % (ref 4–15)
NEUTROPHILS # BLD AUTO: 5 K/UL (ref 1.8–7.7)
NEUTROPHILS NFR BLD: 61.8 % (ref 38–73)
NONHDLC SERPL-MCNC: 109 MG/DL
NRBC BLD-RTO: 0 /100 WBC
PLATELET # BLD AUTO: 236 K/UL (ref 150–450)
PMV BLD AUTO: 12.3 FL (ref 9.2–12.9)
POTASSIUM SERPL-SCNC: 4.4 MMOL/L (ref 3.5–5.1)
PROT SERPL-MCNC: 7.9 G/DL (ref 6–8.4)
RBC # BLD AUTO: 4.75 M/UL (ref 4–5.4)
SODIUM SERPL-SCNC: 138 MMOL/L (ref 136–145)
TRIGL SERPL-MCNC: 89 MG/DL (ref 30–150)
TSH SERPL DL<=0.005 MIU/L-ACNC: 2.52 UIU/ML (ref 0.4–4)
VIT B12 SERPL-MCNC: 533 PG/ML (ref 210–950)
WBC # BLD AUTO: 8.01 K/UL (ref 3.9–12.7)

## 2023-10-27 PROCEDURE — 85025 COMPLETE CBC W/AUTO DIFF WBC: CPT | Performed by: FAMILY MEDICINE

## 2023-10-27 PROCEDURE — 3008F BODY MASS INDEX DOCD: CPT | Mod: CPTII,S$GLB,, | Performed by: FAMILY MEDICINE

## 2023-10-27 PROCEDURE — 1160F RVW MEDS BY RX/DR IN RCRD: CPT | Mod: CPTII,S$GLB,, | Performed by: FAMILY MEDICINE

## 2023-10-27 PROCEDURE — 80053 COMPREHEN METABOLIC PANEL: CPT | Performed by: FAMILY MEDICINE

## 2023-10-27 PROCEDURE — 82607 VITAMIN B-12: CPT | Performed by: FAMILY MEDICINE

## 2023-10-27 PROCEDURE — 1159F PR MEDICATION LIST DOCUMENTED IN MEDICAL RECORD: ICD-10-PCS | Mod: CPTII,S$GLB,, | Performed by: FAMILY MEDICINE

## 2023-10-27 PROCEDURE — 82306 VITAMIN D 25 HYDROXY: CPT | Performed by: FAMILY MEDICINE

## 2023-10-27 PROCEDURE — 36415 COLL VENOUS BLD VENIPUNCTURE: CPT | Performed by: FAMILY MEDICINE

## 2023-10-27 PROCEDURE — 3074F PR MOST RECENT SYSTOLIC BLOOD PRESSURE < 130 MM HG: ICD-10-PCS | Mod: CPTII,S$GLB,, | Performed by: FAMILY MEDICINE

## 2023-10-27 PROCEDURE — 3008F PR BODY MASS INDEX (BMI) DOCUMENTED: ICD-10-PCS | Mod: CPTII,S$GLB,, | Performed by: FAMILY MEDICINE

## 2023-10-27 PROCEDURE — 3074F SYST BP LT 130 MM HG: CPT | Mod: CPTII,S$GLB,, | Performed by: FAMILY MEDICINE

## 2023-10-27 PROCEDURE — 99999 PR PBB SHADOW E&M-EST. PATIENT-LVL V: CPT | Mod: PBBFAC,,, | Performed by: FAMILY MEDICINE

## 2023-10-27 PROCEDURE — 1159F MED LIST DOCD IN RCRD: CPT | Mod: CPTII,S$GLB,, | Performed by: FAMILY MEDICINE

## 2023-10-27 PROCEDURE — 83036 HEMOGLOBIN GLYCOSYLATED A1C: CPT | Performed by: FAMILY MEDICINE

## 2023-10-27 PROCEDURE — 80061 LIPID PANEL: CPT | Performed by: FAMILY MEDICINE

## 2023-10-27 PROCEDURE — 3078F DIAST BP <80 MM HG: CPT | Mod: CPTII,S$GLB,, | Performed by: FAMILY MEDICINE

## 2023-10-27 PROCEDURE — 99396 PREV VISIT EST AGE 40-64: CPT | Mod: 25,S$GLB,, | Performed by: FAMILY MEDICINE

## 2023-10-27 PROCEDURE — 90471 IMMUNIZATION ADMIN: CPT | Mod: S$GLB,,, | Performed by: FAMILY MEDICINE

## 2023-10-27 PROCEDURE — 82728 ASSAY OF FERRITIN: CPT | Performed by: FAMILY MEDICINE

## 2023-10-27 PROCEDURE — 84443 ASSAY THYROID STIM HORMONE: CPT | Performed by: FAMILY MEDICINE

## 2023-10-27 PROCEDURE — 90686 IIV4 VACC NO PRSV 0.5 ML IM: CPT | Mod: S$GLB,,, | Performed by: FAMILY MEDICINE

## 2023-10-27 PROCEDURE — 99396 PR PREVENTIVE VISIT,EST,40-64: ICD-10-PCS | Mod: 25,S$GLB,, | Performed by: FAMILY MEDICINE

## 2023-10-27 PROCEDURE — 99999 PR PBB SHADOW E&M-EST. PATIENT-LVL V: ICD-10-PCS | Mod: PBBFAC,,, | Performed by: FAMILY MEDICINE

## 2023-10-27 PROCEDURE — 83735 ASSAY OF MAGNESIUM: CPT | Performed by: FAMILY MEDICINE

## 2023-10-27 PROCEDURE — 3078F PR MOST RECENT DIASTOLIC BLOOD PRESSURE < 80 MM HG: ICD-10-PCS | Mod: CPTII,S$GLB,, | Performed by: FAMILY MEDICINE

## 2023-10-27 PROCEDURE — 90471 FLU VACCINE (QUAD) GREATER THAN OR EQUAL TO 3YO PRESERVATIVE FREE IM: ICD-10-PCS | Mod: S$GLB,,, | Performed by: FAMILY MEDICINE

## 2023-10-27 PROCEDURE — 1160F PR REVIEW ALL MEDS BY PRESCRIBER/CLIN PHARMACIST DOCUMENTED: ICD-10-PCS | Mod: CPTII,S$GLB,, | Performed by: FAMILY MEDICINE

## 2023-10-27 PROCEDURE — 90686 FLU VACCINE (QUAD) GREATER THAN OR EQUAL TO 3YO PRESERVATIVE FREE IM: ICD-10-PCS | Mod: S$GLB,,, | Performed by: FAMILY MEDICINE

## 2023-10-27 NOTE — PROGRESS NOTES
Office Visit    Patient Name: Daya Limon    : 1980  MRN: [id number]    Subjective:  Daya is a 43 y.o. female who presents today for:    Cough, Annual Exam, and Hand Pain    Prior Physical 10/21/22  Most recent OV 23 UTI symptoms  +FLU/STREP 23    43-year-old generally healthy female patient of mine with chronic GERD/LPR/allergic rhinitis symptoms associated with frequent cough, overweight BMI, chronic back pain, who presents today for annual physical.      She status post  delivery secondary to Non-reassuring fetal status on 21-- had her fourth daughter who is now doing well.     and menstrual cycles returned at 10 months of age.    Cycles regular.   PAP 3/15/22/ HPV (-)--repeat 5 years     Cough: better with more regular use PROTONIX.      Today she reports worsening of her chronic back pain.  In the past PT for back pain helped but needs a repeat referral.   Has not recovered her core strength since her .  Also having some right wrist pain with certain movements as well as right elbow pain radiates into her forearm, particularly when she is engaging in prayer rituals.    She is used Voltaren gel on her right wrist and hand as previously advised with good results, in addition to utilizing her compression sleeve for tendinitis with benefit.      She has not been using anything on her right elbow.     GENERAL LIFESTYLE HABITS:   DIET: generally healthy, some carbs, trying to drink more water.  EXERCISE:  Needs to resume regular cardio, engaging in some calisthenics to help her back but needs to go to PT to be refreshed on exercise regimen.  SLEEP: FAIR, cough no longer keeping her awake but interrupted but her children's poor sleep  WEIGHT:  Has trended up over the last year from BMI of 28->29, she is about 20 lb heavier than her pre pregnancy weight.     SCREENING: MAMMOGRAM 11/10/22-- REPEAT 1 YEAR AND ORDERED, Normal Pap with negative HPV test 3/15/22, Labs  pending     IMMUNIZATIONS: COVID-19 vaccine series ([employer]) completed 04/09/2021- UPDATED COVID-19 VACCINE ADVISED, YEARLY FLU--GIVEN TODAY 10/27/23, TDaP 10/5/21     EYE/DENTAL: EYE Dr Morrow 10/20/23--Undercorrected Hyperopia (plans to get glasses),  and DENTAL UTD          PAST MEDICAL HISTORY, SURGICAL/SOCIAL/FAMILY HISTORY REVIEWED AS PER CHART, WITH PERTINENT FINDINGS INCLUDED IN HISTORY SECTION OF NOTE.     Current Medications    Medication List with Changes/Refills   Current Medications    ALBUTEROL (VENTOLIN HFA) 90 MCG/ACTUATION INHALER    Inhale 2 puffs into the lungs every 6 (six) hours as needed for Wheezing or Shortness of Breath. Rescue    AZELASTINE (ASTELIN) 137 MCG (0.1 %) NASAL SPRAY    1 spray (137 mcg total) by Nasal route 2 (two) times daily.    CHOLECALCIFEROL, VITAMIN D3, 125 MCG (5,000 UNIT) TAB    Take 1 tablet (5,000 Units total) by mouth daily with breakfast.    DICLOFENAC SODIUM (VOLTAREN) 1 % GEL    Apply 4 g topically 4 (four) times daily as needed (joint pain).    FLUTICASONE PROPIONATE (FLONASE) 50 MCG/ACTUATION NASAL SPRAY    2 sprays (100 mcg total) by Each Nostril route once daily. Take daily as needed for nasal congestion    KETOCONAZOLE (NIZORAL) 2 % SHAMPOO    Apply topically twice a week.    LEVOCETIRIZINE (XYZAL) 5 MG TABLET    Take 1 tablet (5 mg total) by mouth every evening.    PANTOPRAZOLE (PROTONIX) 40 MG TABLET    Take 1 tablet (40 mg total) by mouth before breakfast.    PNV,CALCIUM 72-IRON-FOLIC ACID (WESTAB PLUS) 27 MG IRON- 1 MG TAB    Take 1 tablet (1 each total) by mouth once daily.    PRENATAL VIT 28-IRON FUM-FOLIC 27 MG IRON- 1 MG TAB    Take 1 tablet by mouth daily with breakfast.    TRIAMCINOLONE ACETONIDE 0.1% (KENALOG) 0.1 % OINTMENT    Apply topically 2 (two) times daily.   Discontinued Medications    AMOXICILLIN (AMOXIL) 875 MG TABLET    Take 1 tablet (875 mg total) by mouth every 12 (twelve) hours.    DEXTROMETHORPHAN HBR 2.5 MG LOZG    Use ase directed for  cough    IBUPROFEN (ADVIL,MOTRIN) 600 MG TABLET    Take 1 tablet (600 mg total) by mouth every 6 (six) hours as needed for Pain.       Allergies   Review of patient's allergies indicates:  No Known Allergies      Review of Systems (Pertinent positives)  Review of Systems   Constitutional:  Positive for unexpected weight change (mild weight gain).   HENT:  Negative for trouble swallowing.    Respiratory:  Positive for cough. Negative for shortness of breath.    Cardiovascular:  Negative for chest pain.   Gastrointestinal:  Negative for constipation and diarrhea.   Genitourinary:  Negative for dysuria and menstrual problem.   Musculoskeletal:  Positive for arthralgias and back pain.   Allergic/Immunologic: Positive for environmental allergies.   Neurological:  Negative for dizziness and light-headedness.       /64 (BP Location: Left arm, Patient Position: Sitting, BP Method: Large (Manual))   Pulse 88   Temp 99 °F (37.2 °C) (Oral)   Wt 69.8 kg (153 lb 14.1 oz)   SpO2 99%   BMI 29.08 kg/m²     Physical Exam  Vitals reviewed.   Constitutional:       General: She is not in acute distress.     Appearance: Normal appearance. She is well-developed.   HENT:      Head: Normocephalic and atraumatic.      Right Ear: Tympanic membrane and ear canal normal. Tympanic membrane is not erythematous or bulging.      Left Ear: Tympanic membrane and ear canal normal. Tympanic membrane is not erythematous or bulging.      Nose: Nose normal.      Mouth/Throat:      Mouth: Mucous membranes are moist.      Pharynx: No oropharyngeal exudate or posterior oropharyngeal erythema.   Eyes:      Extraocular Movements: Extraocular movements intact.      Conjunctiva/sclera: Conjunctivae normal.   Neck:      Thyroid: No thyroid mass or thyromegaly.      Vascular: No carotid bruit.   Cardiovascular:      Rate and Rhythm: Normal rate and regular rhythm.      Pulses:           Dorsalis pedis pulses are 2+ on the right side and 2+ on the left  side.      Heart sounds: Normal heart sounds. No murmur heard.  Pulmonary:      Effort: Pulmonary effort is normal. No respiratory distress.      Breath sounds: Normal breath sounds.   Abdominal:      General: Bowel sounds are normal. There is no distension.      Palpations: Abdomen is soft. There is no mass.      Tenderness: There is no abdominal tenderness.   Musculoskeletal:         General: Normal range of motion.      Right elbow: Tenderness present in medial epicondyle and lateral epicondyle.      Right forearm: Tenderness present.      Right wrist: Swelling (Slight localized wrist swelling along posterior tendons) and tenderness present.      Right lower leg: No edema.      Left lower leg: No edema.   Lymphadenopathy:      Cervical: No cervical adenopathy.   Skin:     General: Skin is warm and dry.      Findings: No rash.   Neurological:      General: No focal deficit present.      Mental Status: She is alert and oriented to person, place, and time.   Psychiatric:         Mood and Affect: Mood normal.         Behavior: Behavior normal.           Assessment/Plan:  Daya Limon is a 43 y.o. female who presents today for :        ICD-10-CM ICD-9-CM    1. Routine general medical examination at a health care facility  Z00.00 V70.0 Hemoglobin A1C      Comprehensive Metabolic Panel      Lipid Panel      CBC Auto Differential      TSH      Vitamin D      Vitamin B12      Magnesium      Ferritin      2. Overweight (BMI 25.0-29.9)  E66.3 278.02       3. Chronic cough  R05.3 786.2       4. Seasonal allergies  J30.2 477.9       5. Gastroesophageal reflux disease, unspecified whether esophagitis present  K21.9 530.81       6. History of  section  Z98.891 V45.89 Ambulatory referral/consult to Physical/Occupational Therapy      7. Chronic bilateral back pain, unspecified back location  M54.9 724.5 Ambulatory referral/consult to Physical/Occupational Therapy    G89.29 338.29       8. Vitamin D deficiency  E55.9 268.9        9. Anti-TPO antibodies present  R76.8 795.79       10. Needs flu shot  Z23 V04.81 Influenza - Quadrivalent *Preferred* (6 months+) (PF)      11. Encounter for screening mammogram for breast cancer  Z12.31 V76.12 Mammo Digital Screening Bilat      12. Right elbow pain  M25.521 719.42       13. Medial epicondylitis, right elbow  M77.01 726.31       14. Lateral epicondylitis of right elbow  M77.11 726.32       15. Right hand tendonitis  M77.8 727.05         ADVISED ON DIET/EXERCISE/SLEEP, ROUTINE EYE/DENTAL EXAMS, AND THE IMPORTANCE OF KEEPING UP WITH APPROPRIATE SCREENING TESTS BASED ON AGE AND RISK FACTORS.  MAMMOGRAM 11/10/22-- REPEAT 1 YEAR AND ORDERED, Normal Pap with negative HPV test 3/15/22, Labs pending  DECLINES UPDATED COVID VACCINE, YEARLY FLU SHOT GIVEN, TDAP UP-TO-DATE.      OVERWEIGHT BMI:  Discussed the importance of healthy diet and more regular exercise, will be starting PT for back pain which should help.  Labs to screen for weight related health complications and will recheck TSH level that was previously abnormal.      ABNORMAL TSH WITH ELEVATED TPO ANTIBODIES, history of thyroid nodules:  TSH NORMALIZED ON RECHECK, recheck TSH again with current labs-history of positive TPO antibodies so will monitor closely especially in light of recent weight gain.  FNA of thyroid nodule was negative, ongoing clinical monitoring.      CHRONIC COUGH SECONDARY TO ALLERGIC RHINITIS AND GERD:  Cough is better with more regular use of Xyzal and needs to resume Protonix 40 as she has noticed when she stays on that regularly her cough improves.  She uses albuterol inhaler as needed with good results.  There certain foods definitely seem to trigger her cough-sugary foods and acidic fruits especially so she tries to monitor and make dietary adjustments.      VITAMIN-D DEFICIENCY:  Recheck with labs and advised on supplementation.      RIGHT ELBOW AND WRIST PAIN:  Has evidence of both medial and lateral epicondylitis  of the right elbow, upper forearm strap advise during the day with activity, particularly ones that provoke the pain.  Will be more liberal with use of Voltaren gel on her elbow as this does help with her wrist pain.  Continue use of a compression sleeve for her wrist pain secondary to some tendinitis.  Tries to avoid inciting movements, particularly certain ones involving picking up her children.  No weakness.  Continue conservative therapy, follow-up with ortho hand if symptoms progress.      BACK PAIN:  Functional and related to decreased core strength following her .  PT referral placed.  Continue oral analgesics as needed.  No radicular symptoms so will forego imaging at this time.        There are no Patient Instructions on file for this visit.      Follow up for to follow up on lab results, return as needed for new concerns.

## 2023-11-10 ENCOUNTER — TELEPHONE (OUTPATIENT)
Dept: ADMINISTRATIVE | Facility: OTHER | Age: 43
End: 2023-11-10
Payer: COMMERCIAL

## 2023-11-30 ENCOUNTER — CLINICAL SUPPORT (OUTPATIENT)
Dept: REHABILITATION | Facility: HOSPITAL | Age: 43
End: 2023-11-30
Payer: COMMERCIAL

## 2023-11-30 DIAGNOSIS — Z98.891 HISTORY OF CESAREAN SECTION: ICD-10-CM

## 2023-11-30 DIAGNOSIS — G89.29 CHRONIC BILATERAL BACK PAIN, UNSPECIFIED BACK LOCATION: ICD-10-CM

## 2023-11-30 DIAGNOSIS — M54.9 CHRONIC BILATERAL BACK PAIN, UNSPECIFIED BACK LOCATION: ICD-10-CM

## 2023-11-30 DIAGNOSIS — R29.898 WEAKNESS OF HIP, UNSPECIFIED LATERALITY: Primary | ICD-10-CM

## 2023-11-30 DIAGNOSIS — R19.8 ABDOMINAL WEAKNESS: ICD-10-CM

## 2023-11-30 PROCEDURE — 97162 PT EVAL MOD COMPLEX 30 MIN: CPT | Mod: PN

## 2023-11-30 PROCEDURE — 97530 THERAPEUTIC ACTIVITIES: CPT | Mod: PN

## 2023-11-30 NOTE — PLAN OF CARE
[facility]   Physical Therapy Initial Evaluation      Name: Daya Limon  Clinic Number: [id number]    Therapy Diagnosis:   Encounter Diagnoses   Name Primary?    History of  section     Chronic bilateral back pain, unspecified back location     Weakness of hip, unspecified laterality Yes    Abdominal weakness         Physician: Esthela Abdi MD    Physician Orders: PT Eval and Treat   Medical Diagnosis from Referral:   Z98.891 (ICD-10-CM) - History of  section   M54.9,G89.29 (ICD-10-CM) - Chronic bilateral back pain, unspecified back location     Evaluation Date: 2023  Authorization Period Expiration: 2024  Plan of Care Expiration: 2023  Progress Note Due: 2023  Visit # / Visits authorized:    FOTO:     Precautions: Standard     Time In: 10:23 am (arrived 13 minutes late + paperwork)  Time Out: 11:00 am  Total Billable Time: 37 minutes    Subjective     Date of onset: chronic - has attending therapy for same condition 3x prior    History of current condition - Daya reports  in  with delayed healing and infection. Has attending therapy for same condition x 3 (, 2022 x 2). Since discharge, she has not been compliant with HEP. Pain in upper traps, neck, thoracic spine, low back with sitting, standing, and ADL's. Has a two year old and has trouble bending forwards to change her diaper. No numbness and tingling down bilateral lower extremities.     Falls: none     Imaging: none    Prior Therapy: yes, same condition x2  Social History:  lives with their family in [facility].  Occupation: stay at home mom   Prior Level of Function: independent   Current Level of Function: difficulty with cooking, cleaning, and carrying objects.     Pain:  Current 7/10, worst 10/10, best 5/10   Location: bilateral lumbosacral pain.   Description: Aching and Sharp  Aggravating Factors: Sitting, Standing, Bending, and Walking  Easing Factors:  rest    Patients goals: improve ADL's and overall quality of life.      Medical History:   Past Medical History:   Diagnosis Date    GERD (gastroesophageal reflux disease) 2017    Meibomian gland dysfunction (MGD)     Right thyroid nodule 10/20/2019    S/P (-) FNA 19 per endocrine dr bass     S/P primary low transverse  2021    Vitamin D deficiency 2017       Surgical History:   Daya Limon  has a past surgical history that includes Vaginal delivery;  section (N/A, 2021); and Esophagogastroduodenoscopy (N/A, 2022).    Medications:   Daya has a current medication list which includes the following prescription(s): albuterol, azelastine, cholecalciferol (vitamin d3), diclofenac sodium, fluticasone propionate, ketoconazole, levocetirizine, pantoprazole, pnv,calcium 72-iron-folic acid, prenatal vit 28-iron fum-folic, and triamcinolone acetonide 0.1%, and the following Facility-Administered Medications: triamcinolone acetonide.    Allergies:   Review of patient's allergies indicates:  No Known Allergies     Objective      Posture: slumped posture, increased kyphosis and lumbar lordosis  Palpation: tenderness along thoracolumbar paraspinals     Cervical AROM: limited with cervical extension (~25%)    Lumbar AROM:   Degrees Pain/Dysfunction   Flexion 45  NP  [place]'s Sign: -   Extension 15 pain in lumbar spine    Right Side Bending  50% NP   Left Side Bending  50% NP   Right Rotation 100% NP   Left Rotation 75% left sided stiffness      Strength:  RLE  LLE    Hip flexion: 7.1 kg Hip flexion: 5.7 kg   Hip Abduction: 8.9 kg Hip abduction: 9.1 kg   Knee flexion: 7.6 kg Knee flexion: 6.3 kg   Knee extension: 8.3 kg Knee extension: 9.1 kg     Special tests:   TA contraction: poor neuromuscular control   Bridge: fair/poor; unable to reach full hip extension     Muscle Length Tests:  90-90 HS Test: limited     Joint mobility:   Thoracic: Gross P/A hypomobile and painful  Lumbar:  "Gross P/A hypomobile and painful      Intake Outcome Measure for FOTO lumbar Survey    Therapist reviewed FOTO scores for Daya Limon on 2023.   FOTO report - see Media section or FOTO account episode details.    Intake Score: 52%         Treatment     Total Treatment time (time-based codes) separate from Evaluation: 15 minutes     Daya received the treatments listed below:      therapeutic activities to improve functional performance for 15  minutes, including:  Posterior pelvic tilt: 5x5"  Posterior pelvic tilt with hip adduction: 5x5"  Posterior pelvic tilt with hip abduction: 5x5"   Bridge: 10x   Clamshells: green - 10x each   Hip isometric: green - 5x5"    Patient Education and Home Exercises     Education provided:   - Findings; prognosis and plan of care  - Home exercise program  - Modality options  - Therapist contact information      Written Home Exercises Provided: yes. Exercises were reviewed and Daya was able to demonstrate them prior to the end of the session.  Daya demonstrated good  understanding of the education provided. See EMR under Patient Instructions for exercises provided during therapy sessions.    Assessment     Daya is a 43 y.o. female referred to outpatient Physical Therapy with a medical diagnosis of Z98.891 (ICD-10-CM) - History of  section and  M54.9,G89.29 (ICD-10-CM) - Chronic bilateral back pain, unspecified back location. Patient presents with signs and symptoms of low back pain secondary to core instability and hip weakness. Patient has attended therapy for same condition 3 sperate times prior to today's visit. Admits to not completing home exercise program since discharge. Therefore, plan of care for 1x/week for 6 weeks secondary to lack of compliance in the past. Will focus on lumbopelvic stabilization.    Patient prognosis is Fair.   Patient will benefit from skilled outpatient Physical Therapy to address the deficits stated above and in the chart below, " provide patient /family education, and to maximize patientt's level of independence.     Plan of care discussed with patient: Yes  Patient's spiritual, cultural and educational needs considered and patient is agreeable to the plan of care and goals as stated below:     Anticipated Barriers for therapy: lack of compliance with HEP; therapy for same condition 3x prior    Medical Necessity is demonstrated by the following  History  Co-morbidities and personal factors that may impact the plan of care [] LOW: no personal factors / co-morbidities  [] MODERATE: 1-2 personal factors / co-morbidities  [x] HIGH: 3+ personal factors / co-morbidities    Moderate / High Support Documentation:   Co-morbidities affecting plan of care: GERD, MGD    Personal Factors:   coping style  lifestyle  attitudes  compliance      Examination  Body Structures and Functions, activity limitations and participation restrictions that may impact the plan of care [] LOW: addressing 1-2 elements  [x] MODERATE: 3+ elements  [] HIGH: 4+ elements (please support below)    Moderate / High Support Documentation: Based on PMHX, co morbidities , data from assessments and functional level of assistance required with task and clinical presentation directly impacting function.         Clinical Presentation [] LOW: stable  [x] MODERATE: Evolving  [] HIGH: Unstable     Decision Making/ Complexity Score: moderate       Goals:  Short Term Goals (3 Weeks):  1. Patient will be compliant with home exercise program to supplement therapy in promoting functional mobility.  2. Patient will perform posterior pelvic tilt with good control to demonstrate improved core strength.  3. Patient will report no pain during thoracolumbar active range of motion to promote functional mobility.  4. Patient will improve impaired lower extremity manual muscle tests via MicroFet testing by >/= 5 kg to improve strength for functional tasks.    Long Term Goals (6 Weeks):   1. Patient will  improve FOTO score to </= 36% limited to decrease perceived limitation with maintaining/changing body position.   2. Patient will perform sahrmann level 2 with good control to demonstrate improved core strength.  3. Patient will improve impaired lower extremity manual muscle tests via MicroFet testing to at least 10 kg to improve strength for functional tasks.  4. Patient will report no pain during ADL's promote prior level of function.    Plan     Plan of care Certification: 11/30/2023 to 1/16/2024.    Outpatient Physical Therapy 1 times weekly for 6 weeks to include the following interventions: Manual Therapy, Moist Heat/ Ice, Neuromuscular Re-ed, Patient Education, Self Care, Therapeutic Activities, and Therapeutic Exercise.     Anabell Angel PT        Physician's Signature: _________________________________________ Date: ________________

## 2023-12-03 DIAGNOSIS — J01.90 ACUTE BACTERIAL SINUSITIS: Primary | ICD-10-CM

## 2023-12-03 DIAGNOSIS — B96.89 ACUTE BACTERIAL SINUSITIS: Primary | ICD-10-CM

## 2023-12-03 RX ORDER — AMOXICILLIN AND CLAVULANATE POTASSIUM 875; 125 MG/1; MG/1
1 TABLET, FILM COATED ORAL 2 TIMES DAILY
Qty: 20 TABLET | Refills: 0 | Status: SHIPPED | OUTPATIENT
Start: 2023-12-03 | End: 2023-12-13

## 2023-12-12 ENCOUNTER — CLINICAL SUPPORT (OUTPATIENT)
Dept: REHABILITATION | Facility: HOSPITAL | Age: 43
End: 2023-12-12
Payer: COMMERCIAL

## 2023-12-12 DIAGNOSIS — R29.898 WEAKNESS OF HIP, UNSPECIFIED LATERALITY: ICD-10-CM

## 2023-12-12 DIAGNOSIS — R19.8 ABDOMINAL WEAKNESS: ICD-10-CM

## 2023-12-12 DIAGNOSIS — M53.86 DECREASED ROM OF LUMBAR SPINE: Primary | ICD-10-CM

## 2023-12-12 PROCEDURE — 97112 NEUROMUSCULAR REEDUCATION: CPT | Mod: PN

## 2023-12-12 PROCEDURE — 97110 THERAPEUTIC EXERCISES: CPT | Mod: PN

## 2023-12-12 NOTE — PROGRESS NOTES
"[facility]   Physical Therapy Treatment Note      Name: Daya Limon  Clinic Number: [id number]    Therapy Diagnosis: No diagnosis found.  Physician: Esthela Abdi MD    Visit Date: 2023    Physician Orders: PT Eval and Treat   Medical Diagnosis from Referral:   Z98.891 (ICD-10-CM) - History of  section   M54.9,G89.29 (ICD-10-CM) - Chronic bilateral back pain, unspecified back location      Evaluation Date: 2023  Authorization Period Expiration: 2024  Plan of Care Expiration: 2023  Progress Note Due: 2023  Visit # / Visits authorized:  (+ 1)  FOTO:      Precautions: Standard      Time In: 9:05 am   Time Out: 10:00 am  Total Billable Time: 55 minutes    PTA Visit #: 0/5       Subjective     Patient reports: she feels like the exercises have been helping. she has been doing them daily. Pain in the morning that improves once she gets moving. Improved tolerance to functional activities such as cooking/cleaning, still takes breaks, but less of them.    She was compliant with home exercise program.  Response to previous treatment: improved pain levels since initial evaluation  Functional change: ongoing     Pain: 6/10  Location: upper thoracic spine      Objective      Objective Measures updated at progress report unless specified.     Treatment     Daya received the treatments listed below:      therapeutic exercises to develop strength, endurance, flexibility, and core stabilization for 30 minutes including:    Open books: 15x5"  Side-lying hip abduction: 2x10   Standing hip extension: red - 3x10  Standing hip abduction: red - 3x10    neuromuscular re-education activities to improve: Coordination, Kinesthetic, Sense, and Posture for 25 minutes. The following activities were included:    Posterior pelvic tilt: 15x5"   [place] level 1: 2x10   Table top with SAPD: red - 2x10  Bridge: 2x10 with 3" hold   SAPD: green - 2x15 *Add march next "     NEXT: posterior sling pull with step up     Patient Education and Home Exercises       Education provided:   - compliance with HEP    Written Home Exercises Provided: yes. Exercises were reviewed and Daya was able to demonstrate them prior to the end of the session.  Daya demonstrated good  understanding of the education provided. See Electronic Medical Record under Patient Instructions for exercises provided during therapy sessions    Assessment     Daya is a 43 y.o. female referred to outpatient Physical Therapy with a medical diagnosis of Z98.891 (ICD-10-CM) - History of  section and  M54.9,G89.29 (ICD-10-CM) - Chronic bilateral back pain, unspecified back location. Patient presents with signs and symptoms of low back pain secondary to core instability and hip weakness. Patient has attended therapy for same condition 3 sperate times prior.    First follow up consisted of lumbopelvic stabilization training and hip strengthening. Trouble with transverse abdominus activation requiring tactile and verbal cueing. Quick to fatigue with core stabilization. Will monitor response to first follow up next visit.     Daya Is progressing well towards her goals.   Patient prognosis is Fair.     Patient will continue to benefit from skilled outpatient physical therapy to address the deficits listed in the problem list box on initial evaluation, provide pt/family education and to maximize pt's level of independence in the home and community environment.     Patient's spiritual, cultural and educational needs considered and pt agreeable to plan of care and goals.     Anticipated barriers to physical therapy: lack of compliance with HEP; therapy for same condition 3x prior     Goals:   Short Term Goals (3 Weeks):  1. Patient will be compliant with home exercise program to supplement therapy in promoting functional mobility.  2. Patient will perform posterior pelvic tilt with good control to demonstrate improved  core strength.  3. Patient will report no pain during thoracolumbar active range of motion to promote functional mobility.  4. Patient will improve impaired lower extremity manual muscle tests via MicroFet testing by >/= 5 kg to improve strength for functional tasks.     Long Term Goals (6 Weeks):   1. Patient will improve FOTO score to </= 36% limited to decrease perceived limitation with maintaining/changing body position.   2. Patient will perform [facility] level 2 with good control to demonstrate improved core strength.  3. Patient will improve impaired lower extremity manual muscle tests via MicroFet testing to at least 10 kg to improve strength for functional tasks.  4. Patient will report no pain during ADL's promote prior level of function.    Plan     lumbopelvic stabilization  Core stabilization program  Gross hip strengthening     Anabell Angel, PT

## 2024-01-13 DIAGNOSIS — E55.9 VITAMIN D DEFICIENCY: ICD-10-CM

## 2024-01-16 RX ORDER — RESVER/WINE/BFL/GRPSD/PC/C/POM 200MG-60MG
1 CAPSULE ORAL
Qty: 90 TABLET | Refills: 3 | Status: SHIPPED | OUTPATIENT
Start: 2024-01-16

## 2024-02-26 ENCOUNTER — HOSPITAL ENCOUNTER (OUTPATIENT)
Dept: RADIOLOGY | Facility: HOSPITAL | Age: 44
Discharge: HOME | End: 2024-02-26
Attending: FAMILY MEDICINE
Payer: COMMERCIAL

## 2024-02-26 ENCOUNTER — OFFICE VISIT (OUTPATIENT)
Dept: FAMILY MEDICINE | Facility: CLINIC | Age: 44
End: 2024-02-26
Attending: FAMILY MEDICINE
Payer: COMMERCIAL

## 2024-02-26 VITALS
BODY MASS INDEX: 28.72 KG/M2 | HEART RATE: 78 BPM | OXYGEN SATURATION: 98 % | SYSTOLIC BLOOD PRESSURE: 120 MMHG | HEIGHT: 61 IN | WEIGHT: 152.13 LBS | TEMPERATURE: 98 F | DIASTOLIC BLOOD PRESSURE: 70 MMHG

## 2024-02-26 DIAGNOSIS — R05.9 COUGH, UNSPECIFIED TYPE: ICD-10-CM

## 2024-02-26 DIAGNOSIS — J06.9 UPPER RESPIRATORY TRACT INFECTION, UNSPECIFIED TYPE: Primary | ICD-10-CM

## 2024-02-26 PROCEDURE — 3078F DIAST BP <80 MM HG: CPT | Mod: CPTII,S$GLB,, | Performed by: FAMILY MEDICINE

## 2024-02-26 PROCEDURE — 3008F BODY MASS INDEX DOCD: CPT | Mod: CPTII,S$GLB,, | Performed by: FAMILY MEDICINE

## 2024-02-26 PROCEDURE — 1160F RVW MEDS BY RX/DR IN RCRD: CPT | Mod: CPTII,S$GLB,, | Performed by: FAMILY MEDICINE

## 2024-02-26 PROCEDURE — 71046 X-RAY EXAM CHEST 2 VIEWS: CPT | Mod: TC,FY

## 2024-02-26 PROCEDURE — 96372 THER/PROPH/DIAG INJ SC/IM: CPT | Mod: S$GLB,,, | Performed by: FAMILY MEDICINE

## 2024-02-26 PROCEDURE — 3074F SYST BP LT 130 MM HG: CPT | Mod: CPTII,S$GLB,, | Performed by: FAMILY MEDICINE

## 2024-02-26 PROCEDURE — 1159F MED LIST DOCD IN RCRD: CPT | Mod: CPTII,S$GLB,, | Performed by: FAMILY MEDICINE

## 2024-02-26 PROCEDURE — 99214 OFFICE O/P EST MOD 30 MIN: CPT | Mod: 25,S$GLB,, | Performed by: FAMILY MEDICINE

## 2024-02-26 PROCEDURE — 99999 PR PBB SHADOW E&M-EST. PATIENT-LVL III: CPT | Mod: PBBFAC,,, | Performed by: FAMILY MEDICINE

## 2024-02-26 PROCEDURE — 71046 X-RAY EXAM CHEST 2 VIEWS: CPT | Mod: 26,,, | Performed by: STUDENT IN AN ORGANIZED HEALTH CARE EDUCATION/TRAINING PROGRAM

## 2024-02-26 RX ORDER — TRIAMCINOLONE ACETONIDE 40 MG/ML
40 INJECTION, SUSPENSION INTRA-ARTICULAR; INTRAMUSCULAR
Status: DISCONTINUED | OUTPATIENT
Start: 2024-02-26 | End: 2024-02-26

## 2024-02-26 RX ORDER — TRIAMCINOLONE ACETONIDE 40 MG/ML
80 INJECTION, SUSPENSION INTRA-ARTICULAR; INTRAMUSCULAR
Status: COMPLETED | OUTPATIENT
Start: 2024-02-26 | End: 2024-02-26

## 2024-02-26 RX ADMIN — TRIAMCINOLONE ACETONIDE 80 MG: 40 INJECTION, SUSPENSION INTRA-ARTICULAR; INTRAMUSCULAR at 11:02

## 2024-02-26 NOTE — PROGRESS NOTES
Subjective     Patient ID: Daya Limon is a 44 y.o. female.    Chief Complaint: Cough    44 yr old pleasant female with GERD, and chronic cough and no other significant medical history presents today as new patient and evaluation of cough and URI. Onset in 09/23 and did multiple rounds of allergy medications. Tested for flu in between. Continue to cough. No SOB or chest pain. Tested negative for covid. No fever or chills or weight changes. No sick contacts/travel.  Details as follows     Historya s below - reviewed     Cough  This is a chronic problem. The current episode started more than 1 month ago. The problem has been unchanged. The cough is Productive of brown sputum and productive of sputum. Pertinent negatives include no chest pain, ear pain, headaches, heartburn, myalgias, rash, rhinorrhea, sore throat, shortness of breath, sweats or wheezing. Nothing aggravates the symptoms. She has tried OTC cough suppressant and prescription cough suppressant for the symptoms. The treatment provided no relief. Her past medical history is significant for environmental allergies. There is no history of asthma or COPD.     Review of Systems   Constitutional: Negative.  Negative for activity change, diaphoresis and unexpected weight change.   HENT: Negative.  Negative for nasal congestion, ear discharge, ear pain, hearing loss, rhinorrhea, sore throat and voice change.    Eyes: Negative.  Negative for pain, discharge and visual disturbance.   Respiratory:  Positive for cough. Negative for chest tightness, shortness of breath and wheezing.    Cardiovascular: Negative.  Negative for chest pain.   Gastrointestinal: Negative.  Negative for abdominal distention, anal bleeding, constipation, heartburn and nausea.   Endocrine: Negative.  Negative for cold intolerance, polydipsia and polyuria.   Genitourinary: Negative.  Negative for decreased urine volume, difficulty urinating, dysuria, frequency, menstrual problem and vaginal  pain.   Musculoskeletal: Negative.  Negative for arthralgias, gait problem and myalgias.   Integumentary:  Negative for color change, pallor, rash and wound. Negative.   Allergic/Immunologic: Positive for environmental allergies. Negative for immunocompromised state.   Neurological: Negative.  Negative for dizziness, tremors, seizures, speech difficulty and headaches.   Hematological: Negative.  Negative for adenopathy. Does not bruise/bleed easily.   Psychiatric/Behavioral: Negative.  Negative for agitation, confusion, decreased concentration, hallucinations, self-injury and suicidal ideas. The patient is not nervous/anxious.      Past Medical History:   Diagnosis Date    GERD (gastroesophageal reflux disease) 2017    Meibomian gland dysfunction (MGD)     Right thyroid nodule 10/20/2019    S/P (-) FNA 19 per endocrine dr bass     S/P primary low transverse  2021    Vitamin D deficiency 2017       Past Surgical History:   Procedure Laterality Date     SECTION N/A 2021    Procedure:  SECTION;  Surgeon: Lashanda Liu DO;  Location: [facility] L&D;  Service: OB/GYN;  Laterality: N/A;    ESOPHAGOGASTRODUODENOSCOPY N/A 2022    Procedure: EGD (ESOPHAGOGASTRODUODENOSCOPY);  Surgeon: Chano Fritz MD;  Location: [facility];  Service: Endoscopy;  Laterality: N/A;    VAGINAL DELIVERY      X2       Family History   Problem Relation Age of Onset    Diabetes Mother     Diabetes Father     Kidney disease Father     Kidney failure Father     Cataracts Father     No Known Problems Sister     No Known Problems Brother     No Known Problems Maternal Aunt     No Known Problems Maternal Uncle     No Known Problems Paternal Aunt     No Known Problems Paternal Uncle     No Known Problems Maternal Grandmother     No Known Problems Maternal Grandfather     No Known Problems Paternal Grandmother     No Known Problems Paternal Grandfather     Breast cancer Neg Hx     Colon cancer Neg  Hx     Ovarian cancer Neg Hx     Hypertension Neg Hx     Stroke Neg Hx     Amblyopia Neg Hx     Blindness Neg Hx     Cancer Neg Hx     Glaucoma Neg Hx     Macular degeneration Neg Hx     Retinal detachment Neg Hx     Strabismus Neg Hx     Thyroid disease Neg Hx     Thyroid nodules Neg Hx     Thyroid cancer Neg Hx        Social History     Socioeconomic History    Marital status:     Number of children: 2   Tobacco Use    Smoking status: Never     Passive exposure: Never    Smokeless tobacco: Never   Substance and Sexual Activity    Alcohol use: No    Drug use: No    Sexual activity: Yes     Partners: Male     Birth control/protection: None     Social Determinants of Health     Financial Resource Strain: Low Risk  (4/9/2021)    Overall Financial Resource Strain (CARDIA)     Difficulty of Paying Living Expenses: Not hard at all   Food Insecurity: No Food Insecurity (4/9/2021)    Hunger Vital Sign     Worried About Running Out of Food in the Last Year: Never true     Ran Out of Food in the Last Year: Never true   Transportation Needs: No Transportation Needs (4/9/2021)    PRAPARE - Transportation     Lack of Transportation (Medical): No     Lack of Transportation (Non-Medical): No   Physical Activity: Unknown (4/9/2021)    Exercise Vital Sign     Days of Exercise per Week: Patient declined   Stress: No Stress Concern Present (4/9/2021)    [employer] - Occupational Stress Questionnaire     Feeling of Stress : Not at all   Social Connections: Unknown (4/9/2021)    Social Connection and Isolation Panel [NHANES]     Frequency of Communication with Friends and Family: More than three times a week     Frequency of Social Gatherings with Friends and Family: Once a week     Active Member of Clubs or Organizations: No     Attends Club or Organization Meetings: Never     Marital Status:        Current Outpatient Medications   Medication Sig Dispense Refill    albuterol (VENTOLIN HFA)  90 mcg/actuation inhaler Inhale 2 puffs into the lungs every 6 (six) hours as needed for Wheezing or Shortness of Breath. Rescue 18 g 11    azelastine (ASTELIN) 137 mcg (0.1 %) nasal spray 1 spray (137 mcg total) by Nasal route 2 (two) times daily. (Patient not taking: Reported on 10/27/2023) 30 mL 11    diclofenac sodium (VOLTAREN) 1 % Gel Apply 4 g topically 4 (four) times daily as needed (joint pain). 100 g 11    fluticasone propionate (FLONASE) 50 mcg/actuation nasal spray 2 sprays (100 mcg total) by Each Nostril route once daily. Take daily as needed for nasal congestion (Patient not taking: Reported on 10/27/2023) 16 g 11    ketoconazole (NIZORAL) 2 % shampoo Apply topically twice a week. 120 mL 11    levocetirizine (XYZAL) 5 MG tablet Take 1 tablet (5 mg total) by mouth every evening. 30 tablet 11    pantoprazole (PROTONIX) 40 MG tablet Take 1 tablet (40 mg total) by mouth before breakfast. 90 tablet 4    PNV,calcium 72-iron-folic acid (WESTAB PLUS) 27 mg iron- 1 mg Tab Take 1 tablet (1 each total) by mouth once daily. (Patient not taking: Reported on 10/27/2023) 90 tablet 4    prenatal vit 28-iron fum-folic 27 mg iron- 1 mg Tab Take 1 tablet by mouth daily with breakfast. 90 tablet 3    triamcinolone acetonide 0.1% (KENALOG) 0.1 % ointment Apply topically 2 (two) times daily. 80 g 3    VITAMIN D3 125 mcg (5,000 unit) Tab TAKE 1 TABLET BY MOUTH DAILY WITH BREAKFAST 90 tablet 3     Current Facility-Administered Medications   Medication Dose Route Frequency Provider Last Rate Last Admin    triamcinolone acetonide injection 40 mg  40 mg Intramuscular 1 time in Clinic/HOD Reynaldo Sarmiento MD        triamcinolone acetonide injection 80 mg  80 mg Intramuscular 1 time in Clinic/HOD Esthela Abdi MD           Review of patient's allergies indicates:  No Known Allergies         Objective   Vitals:    02/26/24 1340   BP: 120/70   Pulse: 78   Temp: 98 °F (36.7 °C)       Physical Exam  Constitutional:        General: She is not in acute distress.     Appearance: She is well-developed. She is not diaphoretic.   HENT:      Head: Normocephalic and atraumatic.      Right Ear: External ear normal.      Left Ear: External ear normal.      Nose: Nose normal.      Mouth/Throat:      Pharynx: No oropharyngeal exudate.   Eyes:      General: No scleral icterus.        Right eye: No discharge.         Left eye: No discharge.      Conjunctiva/sclera: Conjunctivae normal.      Pupils: Pupils are equal, round, and reactive to light.   Neck:      Thyroid: No thyromegaly.      Vascular: No JVD.      Trachea: No tracheal deviation.   Cardiovascular:      Rate and Rhythm: Normal rate and regular rhythm.      Heart sounds: Normal heart sounds. No murmur heard.     No friction rub. No gallop.   Pulmonary:      Effort: Pulmonary effort is normal.      Breath sounds: Normal breath sounds. No stridor. No wheezing or rales.   Chest:      Chest wall: No tenderness.   Abdominal:      General: Bowel sounds are normal. There is no distension.      Palpations: Abdomen is soft. There is no mass.      Tenderness: There is no abdominal tenderness. There is no guarding or rebound.      Hernia: No hernia is present.   Musculoskeletal:         General: No tenderness. Normal range of motion.      Cervical back: Normal range of motion and neck supple.   Lymphadenopathy:      Cervical: No cervical adenopathy.   Skin:     General: Skin is warm and dry.      Coloration: Skin is not pale.      Findings: No erythema or rash.   Neurological:      Mental Status: She is alert and oriented to person, place, and time.      Cranial Nerves: No cranial nerve deficit.      Motor: No abnormal muscle tone.      Coordination: Coordination normal.      Deep Tendon Reflexes: Reflexes are normal and symmetric. Reflexes normal.   Psychiatric:         Behavior: Behavior normal.         Thought Content: Thought content normal.         Judgment: Judgment normal.        EXAMINATION:  XR CHEST PA AND LATERAL     CLINICAL HISTORY:  Cough, unspecified     TECHNIQUE:  PA and lateral views of the chest were performed.     COMPARISON:  Chest radiograph 04/27/2020     FINDINGS:  The lungs are clear, with normal appearance of pulmonary vasculature and no pleural effusion or pneumothorax.     The cardiac silhouette is normal in size. The hilar and mediastinal contours are unremarkable.     Bones are intact.     Impression:     No acute intrathoracic abnormality.        Electronically signed by: Omid Ojeda  Date:                                            02/26/2024                          Assessment and Plan     1. Upper respiratory tract infection, unspecified type  -     triamcinolone acetonide injection 40 mg    2. Cough, unspecified type  -     X-Ray Chest PA And Lateral; Future; Expected date: 02/26/2024  -     Culture, Respiratory with Gram Stain; Future; Expected date: 02/26/2024        URI/cough  -resistant and not controlled with OTC meds  -CXR to r/o PNA - normal  -sputum cx  -pulmonary and allergy precautions given  -albuterol prn    Spent adequate time in obtaining history and explaining differentials    30 minutes spent during this visit of which greater than 50% devoted to face-face counseling and coordination of care regarding diagnosis and management plan           Follow up if symptoms worsen or fail to improve.

## 2024-03-01 DIAGNOSIS — R05.9 COUGH, UNSPECIFIED TYPE: Primary | ICD-10-CM

## 2024-03-01 RX ORDER — PROMETHAZINE HYDROCHLORIDE AND DEXTROMETHORPHAN HYDROBROMIDE 6.25; 15 MG/5ML; MG/5ML
5 SYRUP ORAL 2 TIMES DAILY PRN
Qty: 180 ML | Refills: 1 | Status: SHIPPED | OUTPATIENT
Start: 2024-03-01 | End: 2024-04-15 | Stop reason: SDUPTHER

## 2024-03-21 DIAGNOSIS — Z12.31 OTHER SCREENING MAMMOGRAM: ICD-10-CM

## 2024-04-15 RX ORDER — PROMETHAZINE HYDROCHLORIDE AND DEXTROMETHORPHAN HYDROBROMIDE 6.25; 15 MG/5ML; MG/5ML
5 SYRUP ORAL 2 TIMES DAILY PRN
Qty: 180 ML | Refills: 1 | Status: SHIPPED | OUTPATIENT
Start: 2024-04-15

## 2024-04-22 ENCOUNTER — PATIENT MESSAGE (OUTPATIENT)
Dept: FAMILY MEDICINE | Facility: CLINIC | Age: 44
End: 2024-04-22
Payer: COMMERCIAL

## 2024-04-22 DIAGNOSIS — N93.8 DYSFUNCTIONAL UTERINE BLEEDING: Primary | ICD-10-CM

## 2024-04-23 ENCOUNTER — LAB VISIT (OUTPATIENT)
Dept: LAB | Facility: HOSPITAL | Age: 44
End: 2024-04-23
Payer: COMMERCIAL

## 2024-04-23 DIAGNOSIS — N93.8 DYSFUNCTIONAL UTERINE BLEEDING: ICD-10-CM

## 2024-04-23 LAB
ALBUMIN SERPL BCP-MCNC: 3.9 G/DL (ref 3.5–5.2)
ALP SERPL-CCNC: 78 U/L (ref 55–135)
ALT SERPL W/O P-5'-P-CCNC: 23 U/L (ref 10–44)
ANION GAP SERPL CALC-SCNC: 9 MMOL/L (ref 8–16)
AST SERPL-CCNC: 22 U/L (ref 10–40)
BASOPHILS # BLD AUTO: 0.05 K/UL (ref 0–0.2)
BASOPHILS NFR BLD: 0.5 % (ref 0–1.9)
BILIRUB SERPL-MCNC: 0.3 MG/DL (ref 0.1–1)
BUN SERPL-MCNC: 12 MG/DL (ref 6–20)
CALCIUM SERPL-MCNC: 9.6 MG/DL (ref 8.7–10.5)
CHLORIDE SERPL-SCNC: 107 MMOL/L (ref 95–110)
CO2 SERPL-SCNC: 22 MMOL/L (ref 23–29)
CREAT SERPL-MCNC: 0.7 MG/DL (ref 0.5–1.4)
DIFFERENTIAL METHOD BLD: NORMAL
EOSINOPHIL # BLD AUTO: 0.2 K/UL (ref 0–0.5)
EOSINOPHIL NFR BLD: 2.1 % (ref 0–8)
ERYTHROCYTE [DISTWIDTH] IN BLOOD BY AUTOMATED COUNT: 14.5 % (ref 11.5–14.5)
EST. GFR  (NO RACE VARIABLE): >60 ML/MIN/1.73 M^2
FERRITIN SERPL-MCNC: 39 NG/ML (ref 20–300)
GLUCOSE SERPL-MCNC: 80 MG/DL (ref 70–110)
HCG INTACT+B SERPL-ACNC: <1.2 MIU/ML
HCT VFR BLD AUTO: 42.8 % (ref 37–48.5)
HGB BLD-MCNC: 13.7 G/DL (ref 12–16)
IMM GRANULOCYTES # BLD AUTO: 0.02 K/UL (ref 0–0.04)
IMM GRANULOCYTES NFR BLD AUTO: 0.2 % (ref 0–0.5)
LYMPHOCYTES # BLD AUTO: 2.6 K/UL (ref 1–4.8)
LYMPHOCYTES NFR BLD: 27.8 % (ref 18–48)
MCH RBC QN AUTO: 28 PG (ref 27–31)
MCHC RBC AUTO-ENTMCNC: 32 G/DL (ref 32–36)
MCV RBC AUTO: 87 FL (ref 82–98)
MONOCYTES # BLD AUTO: 0.7 K/UL (ref 0.3–1)
MONOCYTES NFR BLD: 7.5 % (ref 4–15)
NEUTROPHILS # BLD AUTO: 5.7 K/UL (ref 1.8–7.7)
NEUTROPHILS NFR BLD: 61.9 % (ref 38–73)
NRBC BLD-RTO: 0 /100 WBC
PLATELET # BLD AUTO: 289 K/UL (ref 150–450)
PMV BLD AUTO: 11.7 FL (ref 9.2–12.9)
POTASSIUM SERPL-SCNC: 4.1 MMOL/L (ref 3.5–5.1)
PROT SERPL-MCNC: 7.3 G/DL (ref 6–8.4)
RBC # BLD AUTO: 4.9 M/UL (ref 4–5.4)
SODIUM SERPL-SCNC: 138 MMOL/L (ref 136–145)
TSH SERPL DL<=0.005 MIU/L-ACNC: 2.47 UIU/ML (ref 0.4–4)
WBC # BLD AUTO: 9.21 K/UL (ref 3.9–12.7)

## 2024-04-23 PROCEDURE — 36415 COLL VENOUS BLD VENIPUNCTURE: CPT | Performed by: FAMILY MEDICINE

## 2024-04-23 PROCEDURE — 85025 COMPLETE CBC W/AUTO DIFF WBC: CPT | Performed by: FAMILY MEDICINE

## 2024-04-23 PROCEDURE — 82728 ASSAY OF FERRITIN: CPT | Performed by: FAMILY MEDICINE

## 2024-04-23 PROCEDURE — 83001 ASSAY OF GONADOTROPIN (FSH): CPT | Performed by: FAMILY MEDICINE

## 2024-04-23 PROCEDURE — 80053 COMPREHEN METABOLIC PANEL: CPT | Performed by: FAMILY MEDICINE

## 2024-04-23 PROCEDURE — 84702 CHORIONIC GONADOTROPIN TEST: CPT | Performed by: FAMILY MEDICINE

## 2024-04-23 PROCEDURE — 84443 ASSAY THYROID STIM HORMONE: CPT | Performed by: FAMILY MEDICINE

## 2024-04-23 NOTE — TELEPHONE ENCOUNTER
Please schedule patient for this blood work to evaluate her vaginal bleeding/irregular menses concerns-- non-fasting. Depending on the results and how she is doing in the next couple of days, I may also recommend an ultrasound to further evaluate the uterine lining. I will touch base after reviewing the labs.

## 2024-04-24 LAB — FSH SERPL-ACNC: 6.9 MIU/ML

## 2024-04-25 ENCOUNTER — HOSPITAL ENCOUNTER (OUTPATIENT)
Dept: RADIOLOGY | Facility: HOSPITAL | Age: 44
Discharge: HOME | End: 2024-04-25
Attending: FAMILY MEDICINE
Payer: COMMERCIAL

## 2024-04-25 DIAGNOSIS — Z12.31 OTHER SCREENING MAMMOGRAM: ICD-10-CM

## 2024-04-25 DIAGNOSIS — N93.8 DYSFUNCTIONAL UTERINE BLEEDING: ICD-10-CM

## 2024-04-25 PROCEDURE — 76830 TRANSVAGINAL US NON-OB: CPT | Mod: TC

## 2024-04-25 PROCEDURE — 77067 SCR MAMMO BI INCL CAD: CPT | Mod: 26,,, | Performed by: RADIOLOGY

## 2024-04-25 PROCEDURE — 77067 SCR MAMMO BI INCL CAD: CPT | Mod: TC

## 2024-04-25 PROCEDURE — 76830 TRANSVAGINAL US NON-OB: CPT | Mod: 26,,, | Performed by: RADIOLOGY

## 2024-04-25 PROCEDURE — 77063 BREAST TOMOSYNTHESIS BI: CPT | Mod: TC

## 2024-04-25 PROCEDURE — 76856 US EXAM PELVIC COMPLETE: CPT | Mod: TC

## 2024-04-25 PROCEDURE — 77063 BREAST TOMOSYNTHESIS BI: CPT | Mod: 26,,, | Performed by: RADIOLOGY

## 2024-04-25 PROCEDURE — 76856 US EXAM PELVIC COMPLETE: CPT | Mod: 26,,, | Performed by: RADIOLOGY

## 2024-04-26 ENCOUNTER — TELEPHONE (OUTPATIENT)
Dept: FAMILY MEDICINE | Facility: CLINIC | Age: 44
End: 2024-04-26
Payer: COMMERCIAL

## 2024-04-26 DIAGNOSIS — N83.202 LEFT OVARIAN CYST: Primary | ICD-10-CM

## 2024-04-30 NOTE — PROGRESS NOTES
HPI     DLS: 06/13/2017 Dr. Kumar    Patient states she has been having problems seeing at near x a few months.   She also states her vision has been dry and itchy.   Long standing frontal HAs    AT's PRN OU    Last edited by Enriqueta Mancera on 10/8/2018  9:46 AM. (History)            Assessment /Plan     For exam results, see Encounter Report.    Headache above the eye region  -Uncorrected Hyperopic Rx.  SRx for near use    Meibomian gland dysfunction (MGD), bilateral, both upper and lower lids  -Systane PRN    Hyperopic astigmatism, bilateral  Eyeglass Final Rx     Eyeglass Final Rx       Sphere Cylinder Axis Dist [facility]    Right +0.75 +0.75 105 20/20    Left +0.75 +0.50 065 20/20    Type:  SVL    Expiration Date:  10/9/2019                  RTC 1 yr                 
114

## 2024-06-17 ENCOUNTER — HOSPITAL ENCOUNTER (OUTPATIENT)
Dept: RADIOLOGY | Facility: HOSPITAL | Age: 44
Discharge: HOME | End: 2024-06-17
Attending: FAMILY MEDICINE
Payer: COMMERCIAL

## 2024-06-17 DIAGNOSIS — N83.202 LEFT OVARIAN CYST: ICD-10-CM

## 2024-06-17 PROCEDURE — 76830 TRANSVAGINAL US NON-OB: CPT | Mod: 26,,, | Performed by: RADIOLOGY

## 2024-06-17 PROCEDURE — 76856 US EXAM PELVIC COMPLETE: CPT | Mod: 26,,, | Performed by: RADIOLOGY

## 2024-06-17 PROCEDURE — 76856 US EXAM PELVIC COMPLETE: CPT | Mod: TC

## 2024-06-18 ENCOUNTER — PATIENT MESSAGE (OUTPATIENT)
Dept: FAMILY MEDICINE | Facility: CLINIC | Age: 44
End: 2024-06-18
Payer: COMMERCIAL

## 2024-06-18 DIAGNOSIS — N83.291 COMPLEX CYST OF RIGHT OVARY: Primary | ICD-10-CM

## 2024-06-26 DIAGNOSIS — J30.2 SEASONAL ALLERGIES: ICD-10-CM

## 2024-06-26 RX ORDER — LEVOCETIRIZINE DIHYDROCHLORIDE 5 MG/1
5 TABLET, FILM COATED ORAL NIGHTLY
Qty: 30 TABLET | Refills: 11 | Status: SHIPPED | OUTPATIENT
Start: 2024-06-26 | End: 2025-06-26

## 2024-06-26 NOTE — TELEPHONE ENCOUNTER
No care due was identified.  [facility] Embedded Care Due Messages. Reference number: [id number].   6/26/2024 2:11:49 PM CDT

## 2024-09-05 ENCOUNTER — OFFICE VISIT (OUTPATIENT)
Dept: OBSTETRICS AND GYNECOLOGY | Facility: CLINIC | Age: 44
End: 2024-09-05
Payer: COMMERCIAL

## 2024-09-05 VITALS
SYSTOLIC BLOOD PRESSURE: 136 MMHG | WEIGHT: 151.13 LBS | DIASTOLIC BLOOD PRESSURE: 87 MMHG | BODY MASS INDEX: 28.55 KG/M2

## 2024-09-05 DIAGNOSIS — N83.291 COMPLEX CYST OF RIGHT OVARY: ICD-10-CM

## 2024-09-05 DIAGNOSIS — L30.4 INTERTRIGO: ICD-10-CM

## 2024-09-05 DIAGNOSIS — N83.201 BILATERAL OVARIAN CYSTS: Primary | ICD-10-CM

## 2024-09-05 DIAGNOSIS — N83.202 BILATERAL OVARIAN CYSTS: Primary | ICD-10-CM

## 2024-09-05 PROCEDURE — 99999 PR PBB SHADOW E&M-EST. PATIENT-LVL III: CPT | Mod: PBBFAC,,, | Performed by: STUDENT IN AN ORGANIZED HEALTH CARE EDUCATION/TRAINING PROGRAM

## 2024-09-05 RX ORDER — CLOTRIMAZOLE AND BETAMETHASONE DIPROPIONATE 10; .64 MG/G; MG/G
CREAM TOPICAL 2 TIMES DAILY
Qty: 15 G | Refills: 2 | Status: SHIPPED | OUTPATIENT
Start: 2024-09-05

## 2024-09-05 NOTE — PROGRESS NOTES
CC: ovarian mass    HPI:  Daya Limon is a 44 y.o. female  presents to discuss ovarian cysts. Had US done on 24 for bleeding and follow up on 24 after cyst found. Bleeding has been lasting longer than normal some cycles, used to be 6-8 days at most and then had some months where she was spotting for 21 days. Last month was normal.     24 TVUS: normal uterus, endometrium 3mm, right ovary normal, left ovary 2.7cm hemorrhagic cyst    24 TVUS: normal uterus, endometrium 3mm, right complex ovarian cyst 1.4cm, left simple ovarian cyst 3.9cm     Also reports rash in groin intermittently since gaining a little weight over the summer, it is not present today but was red/itching bilateral groin.     ROS:  GENERAL: No fever, chills, fatigability or weight loss.  VULVAR: No pain, no lesions and no itching.  VAGINAL: No relaxation, no itching, no discharge, and no lesions.  ABDOMEN: No abdominal pain. Denies nausea. Denies vomiting. No diarrhea. No constipation  BREAST: Denies pain. No lumps. No discharge.  URINARY: No incontinence, no nocturia, no frequency and no dysuria.  CARDIOVASCULAR: No chest pain. No shortness of breath. No leg cramps.  NEUROLOGICAL: No headaches. No vision changes.      Patient History:  Past Medical History:   Diagnosis Date    GERD (gastroesophageal reflux disease) 2017    Meibomian gland dysfunction (MGD)     Right thyroid nodule 10/20/2019    S/P (-) FNA 19 per endocrine dr bass     S/P primary low transverse  2021    Vitamin D deficiency 2017     Past Surgical History:   Procedure Laterality Date     SECTION N/A 2021    Procedure:  SECTION;  Surgeon: Lashanda Liu DO;  Location: [facility] L&D;  Service: OB/GYN;  Laterality: N/A;    ESOPHAGOGASTRODUODENOSCOPY N/A 2022    Procedure: EGD (ESOPHAGOGASTRODUODENOSCOPY);  Surgeon: Chano Fritz MD;  Location: [facility];  Service: Endoscopy;  Laterality: N/A;    VAGINAL  DELIVERY      X2     Social History     Tobacco Use    Smoking status: Never     Passive exposure: Never    Smokeless tobacco: Never   Substance Use Topics    Alcohol use: No    Drug use: No     Family History   Problem Relation Name Age of Onset    Diabetes Mother      Diabetes Father      Kidney disease Father      Kidney failure Father      Cataracts Father      No Known Problems Sister      No Known Problems Brother      No Known Problems Maternal Aunt      No Known Problems Maternal Uncle      No Known Problems Paternal Aunt      No Known Problems Paternal Uncle      No Known Problems Maternal Grandmother      No Known Problems Maternal Grandfather      No Known Problems Paternal Grandmother      No Known Problems Paternal Grandfather      Breast cancer Neg Hx      Colon cancer Neg Hx      Ovarian cancer Neg Hx      Hypertension Neg Hx      Stroke Neg Hx      Amblyopia Neg Hx      Blindness Neg Hx      Cancer Neg Hx      Glaucoma Neg Hx      Macular degeneration Neg Hx      Retinal detachment Neg Hx      Strabismus Neg Hx      Thyroid disease Neg Hx      Thyroid nodules Neg Hx      Thyroid cancer Neg Hx       OB History    Para Term  AB Living   6 4 4     3   SAB IAB Ectopic Multiple Live Births         0 3      # Outcome Date GA Lbr Azeem/2nd Weight Sex Type Anes PTL Lv   6 Term 10/17/14 40w5d  3.855 kg (8 lb 8 oz) F Vag-Spont EPI N [place]   5 Term 09 40w0d  3.402 kg (7 lb 8 oz) F Vag-Spont EPI N [place]   4 Term 07 40w0d  3.459 kg (7 lb 10 oz) F Vag-Spont EPI N [place]   3 Term            2             1                 Objective:   /87   Wt 68.6 kg (151 lb 2 oz)   LMP 2024 (Approximate)   BMI 28.55 kg/m²   Patient's last menstrual period was 2024 (approximate).      PHYSICAL EXAM:  APPEARANCE: Well nourished, well developed, in no acute distress.  AFFECT: WNL, alert and oriented x 3  SKIN: No acne or hirsutism  NECK: Neck symmetric without masses or  thyromegaly  CHEST: Good respiratory effect  EXTREMITIES: No edema.      ASSESSMENT and PLAN:    ICD-10-CM ICD-9-CM    1. Bilateral ovarian cysts  N83.201 620.2 US Pelvis Comp with Transvag NON-OB (xpd    N83.202        2. Complex cyst of right ovary  N83.291 620.2 Ambulatory referral/consult to Obstetrics / Gynecology      US Pelvis Comp with Transvag NON-OB (xpd      3. Intertrigo  L30.4 695.89 clotrimazole-betamethasone 1-0.05% (LOTRISONE) cream          Ovarian cysts  - reviewed ultrasound images and reports with patient today, all questions answered   - reviewed benign features of the cysts, likely will go away with time. Will complete another ultrasound to monitor. Would not recommend surgical intervention at this time unless pain increases as the cysts are small/simple cysts  - discussed options for bleeding/prevent follicle development with OCP, patient does not want to take anything at the moment     2. Groin rash  - based on history/description discussed likely intertrigo   - will trial topical Lotrisone cream next time it occurs   - discussed general skin/vulvar care to help prevent rash/irritation       Follow up: as needed if symptoms worsen        Claudia Sanchez MD  OBGYN [facility]

## 2024-09-10 ENCOUNTER — HOSPITAL ENCOUNTER (OUTPATIENT)
Dept: RADIOLOGY | Facility: HOSPITAL | Age: 44
Discharge: HOME | End: 2024-09-10
Attending: STUDENT IN AN ORGANIZED HEALTH CARE EDUCATION/TRAINING PROGRAM
Payer: COMMERCIAL

## 2024-09-10 DIAGNOSIS — N83.201 BILATERAL OVARIAN CYSTS: ICD-10-CM

## 2024-09-10 DIAGNOSIS — N83.202 BILATERAL OVARIAN CYSTS: ICD-10-CM

## 2024-09-10 DIAGNOSIS — N83.291 COMPLEX CYST OF RIGHT OVARY: ICD-10-CM

## 2024-09-10 PROCEDURE — 76830 TRANSVAGINAL US NON-OB: CPT | Mod: TC

## 2024-09-10 PROCEDURE — 76856 US EXAM PELVIC COMPLETE: CPT | Mod: TC

## 2024-09-10 PROCEDURE — 76856 US EXAM PELVIC COMPLETE: CPT | Mod: 26,,, | Performed by: SPECIALIST

## 2024-09-10 PROCEDURE — 76830 TRANSVAGINAL US NON-OB: CPT | Mod: 26,,, | Performed by: SPECIALIST

## 2024-09-18 ENCOUNTER — OFFICE VISIT (OUTPATIENT)
Dept: OPTOMETRY | Facility: CLINIC | Age: 44
End: 2024-09-18
Payer: COMMERCIAL

## 2024-09-18 DIAGNOSIS — H52.203 HYPEROPIC ASTIGMATISM, BILATERAL: ICD-10-CM

## 2024-09-18 DIAGNOSIS — R51.9 HEADACHE ABOVE THE EYE REGION: Primary | ICD-10-CM

## 2024-09-18 PROCEDURE — 92014 COMPRE OPH EXAM EST PT 1/>: CPT | Mod: S$GLB,,, | Performed by: OPTOMETRIST

## 2024-09-18 PROCEDURE — 99999 PR PBB SHADOW E&M-EST. PATIENT-LVL III: CPT | Mod: PBBFAC,,, | Performed by: OPTOMETRIST

## 2024-09-18 PROCEDURE — 1159F MED LIST DOCD IN RCRD: CPT | Mod: CPTII,S$GLB,, | Performed by: OPTOMETRIST

## 2024-09-18 PROCEDURE — 92015 DETERMINE REFRACTIVE STATE: CPT | Mod: S$GLB,,, | Performed by: OPTOMETRIST

## 2024-09-18 NOTE — PROGRESS NOTES
HPI    Annual Exam   Pt states vision is stable c specs   Pt reports full time spec wear recently     Pt denies F/F     Pt denies Dry/ Itchy  Gtt: No    Last edited by Sona Slade on 9/18/2024  2:27 PM.            Assessment /Plan     For exam results, see Encounter Report.    Headache above the eye region  -consistent with hyperopia, sRx FTW    Hyperopic astigmatism, bilateral  Eyeglass Final Rx       Eyeglass Final Rx         Sphere Cylinder Axis Dist VA    Right +1.50 +0.75 105 20/20    Left +1.75 +0.50 070 20/20      Type: SVL    Expiration Date: 9/18/2025                  Reviewed PALs and will wait at least 1 more year    RTC 1 yr

## 2024-09-29 PROBLEM — E66.3 OVERWEIGHT (BMI 25.0-29.9): Status: ACTIVE | Noted: 2024-09-29

## 2024-09-29 NOTE — PROGRESS NOTES
Office Visit    Patient Name: Daya Limon    : 1980  MRN: [id number]    Subjective:  Daya is a 44 y.o. female who presents today for:    Annual Exam    Prior Physical 10/27/23  Most recent OV w/ primary care on 24 w/ Dr Sarmiento for URI/Cough w/ (-) sputum culture and CXR WNL 24.     Seen by ObGyn Dr Sanchez re: ovarian cysts--reassurance provided re: benign nature, patient declined OCP  & follow up ultrasound 9/10/24 was unremarkable     44-year-old generally healthy female patient of mine with chronic GERD/LPR/allergic rhinitis symptoms associated with frequent cough, overweight BMI, chronic back pain, recent menstrual cycle irregularities, who presents today for annual physical.      S/P  delivery secondary to Non-reassuring fetal status on 21-- had her fourth daughter who is now doing well.     and menstrual cycles returned at 10 months of age.    Cycles currently regular and not overly painful.  PAP 3/15/22/ HPV (-)--repeat 5 years     Had labs 24 and TV ultrasound to evaluate cycle irregularities and CBC/Ferritin, FSH 6.9, TH 2.4, CMP all unremarkable.   OBGYN offered birth control but she declined and cycles have normalized on her own.    Cough: better with more regular use PROTONIX.   Back pain comes and goes but it is overall better with focusing on recovering her core strength following her most recent .  Her youngest daughter is 2.    She has intermittently attended PT but needs to be more consistent about home exercise program.  Also open to trying a p.r.n. medication for insomnia due to some more frequent trouble sleeping.      Otherwise no complaints.     GENERAL LIFESTYLE HABITS:   DIET: generally healthy, some carbs, trying to drink more water-- not drinking enough currently  EXERCISE: Doing some walking and yoga. Intermittently engaging in some calisthenics and needing to do more regular back exercises  SLEEP: FAIR, cough no longer keeping her awake  but her mind is racing with anxieties about her children and at interrupted but her children's poor sleep  WEIGHT:  Recently stable at BMI of 28.  Still about 10-20 lb above her pre pregnancy baseline, but weight gain has stopped and she is intermittently able to lose a few lb.     SCREENING: MAMMOGRAM 4/25/24- REPEAT 1 YEAR AND ORDERED, Normal Pap with negative HPV test 3/15/22--repeat 5 years (3/15/2027), Labs pending.      IMMUNIZATIONS: COVID-19 vaccine series ([employer]) completed 04/09/2021- UPDATED SEASONAL COVID-19 VACCINE ADVISED, YEARLY FLU--GIVEN TODAY 9/30/24  10/27/23, TDaP 10/5/21     EYE/DENTAL: EYE Dr Morrow 9/18/24 -Hyperopia w/ headache  and DENTAL UTD       PAST MEDICAL HISTORY, SURGICAL/SOCIAL/FAMILY HISTORY REVIEWED AS PER CHART, WITH PERTINENT FINDINGS INCLUDED IN HISTORY SECTION OF NOTE.     Current Medications    Medication List with Changes/Refills   New Medications    HYDROXYZINE HCL (ATARAX) 25 MG TABLET    Take 1/2-2 tablets nightly as needed for insomnia   Current Medications    ALBUTEROL (VENTOLIN HFA) 90 MCG/ACTUATION INHALER    Inhale 2 puffs into the lungs every 6 (six) hours as needed for Wheezing or Shortness of Breath. Rescue    AZELASTINE (ASTELIN) 137 MCG (0.1 %) NASAL SPRAY    1 spray (137 mcg total) by Nasal route 2 (two) times daily.    CLOTRIMAZOLE-BETAMETHASONE 1-0.05% (LOTRISONE) CREAM    Apply topically 2 (two) times daily.    DICLOFENAC SODIUM (VOLTAREN) 1 % GEL    Apply 4 g topically 4 (four) times daily as needed (joint pain).    FLUTICASONE PROPIONATE (FLONASE) 50 MCG/ACTUATION NASAL SPRAY    2 sprays (100 mcg total) by Each Nostril route once daily. Take daily as needed for nasal congestion    KETOCONAZOLE (NIZORAL) 2 % SHAMPOO    Apply topically twice a week.    PANTOPRAZOLE (PROTONIX) 40 MG TABLET    Take 1 tablet (40 mg total) by mouth before breakfast.    PRENATAL VIT 28-IRON FUM-FOLIC 27 MG IRON- 1 MG TAB    Take 1 tablet by mouth daily with breakfast.     "TRIAMCINOLONE ACETONIDE 0.1% (KENALOG) 0.1 % OINTMENT    Apply topically 2 (two) times daily.    VITAMIN D3 125 MCG (5,000 UNIT) TAB    TAKE 1 TABLET BY MOUTH DAILY WITH BREAKFAST   Changed and/or Refilled Medications    Modified Medication Previous Medication    LEVOCETIRIZINE (XYZAL) 5 MG TABLET levocetirizine (XYZAL) 5 MG tablet       Take 1 tablet (5 mg total) by mouth every evening.    Take 1 tablet (5 mg total) by mouth every evening.   Discontinued Medications    PNV,CALCIUM 72-IRON-FOLIC ACID (WESTAB PLUS) 27 MG IRON- 1 MG TAB    Take 1 tablet (1 each total) by mouth once daily.    PROMETHAZINE-DEXTROMETHORPHAN (PROMETHAZINE-DM) 6.25-15 MG/5 ML SYRP    Take 5 mLs by mouth 2 (two) times daily as needed (cough).       Allergies   Review of patient's allergies indicates:  No Known Allergies      Review of Systems (Pertinent positives)  Review of Systems   Constitutional:  Negative for unexpected weight change.   HENT:  Negative for dental problem and trouble swallowing.    Respiratory:  Negative for cough and shortness of breath.    Cardiovascular:  Negative for chest pain.   Gastrointestinal:  Negative for constipation and diarrhea.   Genitourinary:  Negative for difficulty urinating and menstrual problem.   Allergic/Immunologic: Positive for environmental allergies.   Neurological:  Negative for dizziness and light-headedness.   Psychiatric/Behavioral:  Positive for sleep disturbance.        BP (!) 96/58 (BP Location: Left arm, Patient Position: Sitting)   Pulse 91   Temp 99.2 °F (37.3 °C)   Ht 5' 1" (1.549 m)   Wt 68.3 kg (150 lb 9.2 oz)   LMP 09/25/2024 (Exact Date)   SpO2 98%   BMI 28.45 kg/m²     Physical Exam  Vitals reviewed.   Constitutional:       General: She is not in acute distress.     Appearance: Normal appearance. She is well-developed.   HENT:      Head: Normocephalic and atraumatic.      Right Ear: Tympanic membrane and ear canal normal. Tympanic membrane is not erythematous or bulging. "      Left Ear: Tympanic membrane and ear canal normal. Tympanic membrane is not erythematous or bulging.      Nose: Nose normal.      Mouth/Throat:      Mouth: Mucous membranes are moist.      Pharynx: No oropharyngeal exudate or posterior oropharyngeal erythema.   Eyes:      Extraocular Movements: Extraocular movements intact.      Conjunctiva/sclera: Conjunctivae normal.   Neck:      Thyroid: No thyroid mass or thyromegaly.      Vascular: No carotid bruit.   Cardiovascular:      Rate and Rhythm: Normal rate and regular rhythm.      Pulses:           Dorsalis pedis pulses are 2+ on the right side and 2+ on the left side.      Heart sounds: Normal heart sounds. No murmur heard.  Pulmonary:      Effort: Pulmonary effort is normal. No respiratory distress.      Breath sounds: Normal breath sounds.   Abdominal:      General: Bowel sounds are normal. There is no distension.      Palpations: Abdomen is soft. There is no mass.      Tenderness: There is no abdominal tenderness.   Musculoskeletal:         General: Normal range of motion.      Right lower leg: No edema.      Left lower leg: No edema.   Lymphadenopathy:      Cervical: No cervical adenopathy.   Skin:     General: Skin is warm and dry.      Findings: No rash.   Neurological:      General: No focal deficit present.      Mental Status: She is alert and oriented to person, place, and time.   Psychiatric:         Mood and Affect: Mood normal.         Behavior: Behavior normal.           Assessment/Plan:  Daya Limon is a 44 y.o. female who presents today for :        ICD-10-CM ICD-9-CM    1. Routine general medical examination at a health care facility  Z00.00 V70.0 Hemoglobin A1C      Comprehensive Metabolic Panel      Lipid Panel      CBC Auto Differential      TSH      Vitamin D      Magnesium      Vitamin B12      CANCELED: Mammo Digital Screening Bilat      2. Overweight (BMI 25.0-29.9)  E66.3 278.02       3. Gastroesophageal reflux disease, unspecified  whether esophagitis present  K21.9 530.81       4. Seasonal allergies  J30.2 477.9 levocetirizine (XYZAL) 5 MG tablet      5. Encounter for monitoring long-term proton pump inhibitor therapy  Z51.81 V58.83     Z79.899 V58.69       6. Vitamin D deficiency  E55.9 268.9       7. History of  section  Z98.891 V45.89       8. Abdominal weakness  R19.8 789.9       9. Chronic bilateral back pain, unspecified back location  M54.9 724.5     G89.29 338.29       10. Screening mammogram for breast cancer  Z12.31 V76.12 Mammo Digital Screening Bilat      CANCELED: Mammo Digital Screening Bilat      11. Needs flu shot  Z23 V04.81 influenza (Flulaval, Fluzone, Fluarix) 45 mcg/0.5 mL IM vaccine (> or = 6 mo) 0.5 mL      12. Insomnia, unspecified type  G47.00 780.52 hydrOXYzine HCL (ATARAX) 25 MG tablet        ADVISED ON DIET/EXERCISE/SLEEP, ROUTINE EYE/DENTAL EXAMS, AND THE IMPORTANCE OF KEEPING UP WITH APPROPRIATE SCREENING TESTS BASED ON AGE AND RISK FACTORS.  HEALTH MAINTENANCE REVIEWED.  MAMMOGRAM 24-- REPEAT 1 YEAR AND ORDERED, Normal Pap with negative HPV test 3/15/22--REPEAT 5 YEARS, Labs pending  DECLINES UPDATED COVID VACCINE, YEARLY FLU SHOT GIVEN TODAY 24 , TDAP UP-TO-DATE 10/5/21.      OVERWEIGHT BMI:  Discussed the importance of healthy diet and more regular exercise, will be starting PT for back pain which should help.  Labs to screen for weight related health complications and will recheck TSH level that was previously abnormal.       ABNORMAL TSH WITH ELEVATED TPO ANTIBODIES, history of thyroid nodules:  TSH RECENTLY IN NORMAL RANGE. Has history of positive TPO antibodies so will monitor closely especially in light of recent weight gain.  FNA of R thyroid nodule was negative 19, ongoing clinical monitoring and will repeat thyroid ultrasound to ensure stability as nodules palpable on exam and it has been about 5 years since prior evaluation.    IRREGULAR MENSES: labs for evaluation including  ferritin/CBC, TSH, FSH 24 WNL/ unremarkable. Had TV ultrasounds though me and also ObGyn for F/U of ovarian cysts and most recent US 9/10/24 unremarkable.      CHRONIC COUGH SECONDARY TO ALLERGIC RHINITIS AND GERD: Cough is better with more regular use of Xyzal and needs to resume Protonix 40 as she has noticed when she stays on that regularly her cough improves.  She uses albuterol inhaler as needed with good results.  There certain foods definitely seem to trigger her cough-sugary foods and acidic fruits especially so she tries to monitor and make dietary adjustments.       VITAMIN-D DEFICIENCY:  Currently taking Vitamin D 2,000IU D3 daily with daily prenatal multivitamin.  Recheck with labs and advise based on results        BACK PAIN:  Functional and related to decreased core strength following her .  Previously attended physical therapy.  Continue oral analgesics as needed.  No radicular symptoms so will forego imaging at this time.   Advise improved adherence to home exercise program.    Episodic insomnia:  TRIAL OF HYDROXYZINE P.R.N.            There are no Patient Instructions on file for this visit.      Follow up for to follow up on lab results, return as needed for new concerns.

## 2024-09-30 ENCOUNTER — OFFICE VISIT (OUTPATIENT)
Dept: FAMILY MEDICINE | Facility: CLINIC | Age: 44
End: 2024-09-30
Payer: COMMERCIAL

## 2024-09-30 VITALS
BODY MASS INDEX: 28.42 KG/M2 | HEIGHT: 61 IN | DIASTOLIC BLOOD PRESSURE: 58 MMHG | TEMPERATURE: 99 F | WEIGHT: 150.56 LBS | HEART RATE: 91 BPM | OXYGEN SATURATION: 98 % | SYSTOLIC BLOOD PRESSURE: 96 MMHG

## 2024-09-30 DIAGNOSIS — G89.29 CHRONIC BILATERAL BACK PAIN, UNSPECIFIED BACK LOCATION: ICD-10-CM

## 2024-09-30 DIAGNOSIS — E04.1 RIGHT THYROID NODULE: ICD-10-CM

## 2024-09-30 DIAGNOSIS — R19.8 ABDOMINAL WEAKNESS: ICD-10-CM

## 2024-09-30 DIAGNOSIS — E55.9 VITAMIN D DEFICIENCY: ICD-10-CM

## 2024-09-30 DIAGNOSIS — K21.9 GASTROESOPHAGEAL REFLUX DISEASE, UNSPECIFIED WHETHER ESOPHAGITIS PRESENT: ICD-10-CM

## 2024-09-30 DIAGNOSIS — Z98.891 HISTORY OF CESAREAN SECTION: ICD-10-CM

## 2024-09-30 DIAGNOSIS — Z23 NEEDS FLU SHOT: ICD-10-CM

## 2024-09-30 DIAGNOSIS — Z51.81 ENCOUNTER FOR MONITORING LONG-TERM PROTON PUMP INHIBITOR THERAPY: ICD-10-CM

## 2024-09-30 DIAGNOSIS — E66.3 OVERWEIGHT (BMI 25.0-29.9): ICD-10-CM

## 2024-09-30 DIAGNOSIS — G47.00 INSOMNIA, UNSPECIFIED TYPE: ICD-10-CM

## 2024-09-30 DIAGNOSIS — Z79.899 ENCOUNTER FOR MONITORING LONG-TERM PROTON PUMP INHIBITOR THERAPY: ICD-10-CM

## 2024-09-30 DIAGNOSIS — J30.2 SEASONAL ALLERGIES: ICD-10-CM

## 2024-09-30 DIAGNOSIS — Z00.00 ROUTINE GENERAL MEDICAL EXAMINATION AT A HEALTH CARE FACILITY: Primary | ICD-10-CM

## 2024-09-30 DIAGNOSIS — M54.9 CHRONIC BILATERAL BACK PAIN, UNSPECIFIED BACK LOCATION: ICD-10-CM

## 2024-09-30 DIAGNOSIS — Z12.31 SCREENING MAMMOGRAM FOR BREAST CANCER: ICD-10-CM

## 2024-09-30 PROCEDURE — 99396 PREV VISIT EST AGE 40-64: CPT | Mod: 25,S$GLB,, | Performed by: FAMILY MEDICINE

## 2024-09-30 PROCEDURE — 1160F RVW MEDS BY RX/DR IN RCRD: CPT | Mod: CPTII,S$GLB,, | Performed by: FAMILY MEDICINE

## 2024-09-30 PROCEDURE — 90656 IIV3 VACC NO PRSV 0.5 ML IM: CPT | Mod: S$GLB,,, | Performed by: FAMILY MEDICINE

## 2024-09-30 PROCEDURE — 3074F SYST BP LT 130 MM HG: CPT | Mod: CPTII,S$GLB,, | Performed by: FAMILY MEDICINE

## 2024-09-30 PROCEDURE — 99999 PR PBB SHADOW E&M-EST. PATIENT-LVL V: CPT | Mod: PBBFAC,,, | Performed by: FAMILY MEDICINE

## 2024-09-30 PROCEDURE — 90471 IMMUNIZATION ADMIN: CPT | Mod: S$GLB,,, | Performed by: FAMILY MEDICINE

## 2024-09-30 PROCEDURE — 3078F DIAST BP <80 MM HG: CPT | Mod: CPTII,S$GLB,, | Performed by: FAMILY MEDICINE

## 2024-09-30 PROCEDURE — 1159F MED LIST DOCD IN RCRD: CPT | Mod: CPTII,S$GLB,, | Performed by: FAMILY MEDICINE

## 2024-09-30 PROCEDURE — 3008F BODY MASS INDEX DOCD: CPT | Mod: CPTII,S$GLB,, | Performed by: FAMILY MEDICINE

## 2024-09-30 RX ORDER — HYDROXYZINE HYDROCHLORIDE 25 MG/1
TABLET, FILM COATED ORAL
Qty: 30 TABLET | Refills: 11 | Status: SHIPPED | OUTPATIENT
Start: 2024-09-30

## 2024-09-30 RX ORDER — LEVOCETIRIZINE DIHYDROCHLORIDE 5 MG/1
5 TABLET, FILM COATED ORAL NIGHTLY
Qty: 30 TABLET | Refills: 11 | Status: SHIPPED | OUTPATIENT
Start: 2024-09-30 | End: 2025-09-30

## 2024-10-03 ENCOUNTER — LAB VISIT (OUTPATIENT)
Dept: LAB | Facility: HOSPITAL | Age: 44
End: 2024-10-03
Attending: FAMILY MEDICINE
Payer: COMMERCIAL

## 2024-10-03 DIAGNOSIS — Z00.00 ROUTINE GENERAL MEDICAL EXAMINATION AT A HEALTH CARE FACILITY: ICD-10-CM

## 2024-10-03 LAB
25(OH)D3+25(OH)D2 SERPL-MCNC: 27 NG/ML (ref 30–96)
ALBUMIN SERPL BCP-MCNC: 4.1 G/DL (ref 3.5–5.2)
ALP SERPL-CCNC: 72 U/L (ref 55–135)
ALT SERPL W/O P-5'-P-CCNC: 22 U/L (ref 10–44)
ANION GAP SERPL CALC-SCNC: 5 MMOL/L (ref 8–16)
AST SERPL-CCNC: 24 U/L (ref 10–40)
BASOPHILS # BLD AUTO: 0.04 K/UL (ref 0–0.2)
BASOPHILS NFR BLD: 0.5 % (ref 0–1.9)
BILIRUB SERPL-MCNC: 0.4 MG/DL (ref 0.1–1)
BUN SERPL-MCNC: 10 MG/DL (ref 6–20)
CALCIUM SERPL-MCNC: 10.1 MG/DL (ref 8.7–10.5)
CHLORIDE SERPL-SCNC: 106 MMOL/L (ref 95–110)
CHOLEST SERPL-MCNC: 173 MG/DL (ref 120–199)
CHOLEST/HDLC SERPL: 3.3 {RATIO} (ref 2–5)
CO2 SERPL-SCNC: 26 MMOL/L (ref 23–29)
CREAT SERPL-MCNC: 0.6 MG/DL (ref 0.5–1.4)
DIFFERENTIAL METHOD BLD: ABNORMAL
EOSINOPHIL # BLD AUTO: 0.3 K/UL (ref 0–0.5)
EOSINOPHIL NFR BLD: 3.1 % (ref 0–8)
ERYTHROCYTE [DISTWIDTH] IN BLOOD BY AUTOMATED COUNT: 13.5 % (ref 11.5–14.5)
EST. GFR  (NO RACE VARIABLE): >60 ML/MIN/1.73 M^2
ESTIMATED AVG GLUCOSE: 105 MG/DL (ref 68–131)
GLUCOSE SERPL-MCNC: 84 MG/DL (ref 70–110)
HBA1C MFR BLD: 5.3 % (ref 4–5.6)
HCT VFR BLD AUTO: 43.1 % (ref 37–48.5)
HDLC SERPL-MCNC: 53 MG/DL (ref 40–75)
HDLC SERPL: 30.6 % (ref 20–50)
HGB BLD-MCNC: 13.7 G/DL (ref 12–16)
IMM GRANULOCYTES # BLD AUTO: 0.02 K/UL (ref 0–0.04)
IMM GRANULOCYTES NFR BLD AUTO: 0.3 % (ref 0–0.5)
LDLC SERPL CALC-MCNC: 105.8 MG/DL (ref 63–159)
LYMPHOCYTES # BLD AUTO: 2.9 K/UL (ref 1–4.8)
LYMPHOCYTES NFR BLD: 35.9 % (ref 18–48)
MAGNESIUM SERPL-MCNC: 2.1 MG/DL (ref 1.6–2.6)
MCH RBC QN AUTO: 28.1 PG (ref 27–31)
MCHC RBC AUTO-ENTMCNC: 31.8 G/DL (ref 32–36)
MCV RBC AUTO: 88 FL (ref 82–98)
MONOCYTES # BLD AUTO: 0.5 K/UL (ref 0.3–1)
MONOCYTES NFR BLD: 6.3 % (ref 4–15)
NEUTROPHILS # BLD AUTO: 4.3 K/UL (ref 1.8–7.7)
NEUTROPHILS NFR BLD: 53.9 % (ref 38–73)
NONHDLC SERPL-MCNC: 120 MG/DL
NRBC BLD-RTO: 0 /100 WBC
PLATELET # BLD AUTO: 245 K/UL (ref 150–450)
PMV BLD AUTO: 11.6 FL (ref 9.2–12.9)
POTASSIUM SERPL-SCNC: 4.4 MMOL/L (ref 3.5–5.1)
PROT SERPL-MCNC: 7.6 G/DL (ref 6–8.4)
RBC # BLD AUTO: 4.88 M/UL (ref 4–5.4)
SODIUM SERPL-SCNC: 137 MMOL/L (ref 136–145)
TRIGL SERPL-MCNC: 71 MG/DL (ref 30–150)
TSH SERPL DL<=0.005 MIU/L-ACNC: 3.82 UIU/ML (ref 0.4–4)
VIT B12 SERPL-MCNC: 514 PG/ML (ref 210–950)
WBC # BLD AUTO: 8 K/UL (ref 3.9–12.7)

## 2024-10-03 PROCEDURE — 36415 COLL VENOUS BLD VENIPUNCTURE: CPT | Performed by: FAMILY MEDICINE

## 2024-10-03 PROCEDURE — 82306 VITAMIN D 25 HYDROXY: CPT | Performed by: FAMILY MEDICINE

## 2024-10-03 PROCEDURE — 85025 COMPLETE CBC W/AUTO DIFF WBC: CPT | Performed by: FAMILY MEDICINE

## 2024-10-03 PROCEDURE — 83036 HEMOGLOBIN GLYCOSYLATED A1C: CPT | Performed by: FAMILY MEDICINE

## 2024-10-03 PROCEDURE — 83735 ASSAY OF MAGNESIUM: CPT | Performed by: FAMILY MEDICINE

## 2024-10-03 PROCEDURE — 84443 ASSAY THYROID STIM HORMONE: CPT | Performed by: FAMILY MEDICINE

## 2024-10-03 PROCEDURE — 82607 VITAMIN B-12: CPT | Performed by: FAMILY MEDICINE

## 2024-10-03 PROCEDURE — 80061 LIPID PANEL: CPT | Performed by: FAMILY MEDICINE

## 2024-10-03 PROCEDURE — 80053 COMPREHEN METABOLIC PANEL: CPT | Performed by: FAMILY MEDICINE

## 2024-10-04 ENCOUNTER — HOSPITAL ENCOUNTER (OUTPATIENT)
Dept: RADIOLOGY | Facility: HOSPITAL | Age: 44
Discharge: HOME | End: 2024-10-04
Attending: FAMILY MEDICINE
Payer: COMMERCIAL

## 2024-10-04 DIAGNOSIS — E04.1 RIGHT THYROID NODULE: ICD-10-CM

## 2024-10-04 PROCEDURE — 76536 US EXAM OF HEAD AND NECK: CPT | Mod: TC

## 2024-10-04 PROCEDURE — 76536 US EXAM OF HEAD AND NECK: CPT | Mod: 26,,, | Performed by: RADIOLOGY

## 2024-10-26 ENCOUNTER — OFFICE VISIT (OUTPATIENT)
Dept: URGENT CARE | Facility: CLINIC | Age: 44
End: 2024-10-26
Payer: COMMERCIAL

## 2024-10-26 VITALS
OXYGEN SATURATION: 100 % | RESPIRATION RATE: 20 BRPM | HEIGHT: 61 IN | HEART RATE: 79 BPM | TEMPERATURE: 98 F | DIASTOLIC BLOOD PRESSURE: 80 MMHG | BODY MASS INDEX: 28.31 KG/M2 | WEIGHT: 149.94 LBS | SYSTOLIC BLOOD PRESSURE: 116 MMHG

## 2024-10-26 DIAGNOSIS — R06.2 WHEEZING: ICD-10-CM

## 2024-10-26 DIAGNOSIS — R05.9 COUGH, UNSPECIFIED TYPE: ICD-10-CM

## 2024-10-26 DIAGNOSIS — J06.9 VIRAL URI WITH COUGH: Primary | ICD-10-CM

## 2024-10-26 LAB
CTP QC/QA: YES
SARS-COV-2 AG RESP QL IA.RAPID: NEGATIVE

## 2024-10-26 PROCEDURE — 94640 AIRWAY INHALATION TREATMENT: CPT | Mod: S$GLB,,,

## 2024-10-26 PROCEDURE — 87811 SARS-COV-2 COVID19 W/OPTIC: CPT | Mod: QW,S$GLB,,

## 2024-10-26 PROCEDURE — 99214 OFFICE O/P EST MOD 30 MIN: CPT | Mod: 25,S$GLB,,

## 2024-10-26 PROCEDURE — 96372 THER/PROPH/DIAG INJ SC/IM: CPT | Mod: 59,S$GLB,,

## 2024-10-26 PROCEDURE — 71046 X-RAY EXAM CHEST 2 VIEWS: CPT | Mod: FY,S$GLB,, | Performed by: RADIOLOGY

## 2024-10-26 RX ORDER — DEXAMETHASONE SODIUM PHOSPHATE 10 MG/ML
10 INJECTION INTRAMUSCULAR; INTRAVENOUS
Status: COMPLETED | OUTPATIENT
Start: 2024-10-26 | End: 2024-10-26

## 2024-10-26 RX ORDER — ALBUTEROL SULFATE 0.83 MG/ML
2.5 SOLUTION RESPIRATORY (INHALATION) EVERY 6 HOURS PRN
Qty: 90 ML | Refills: 0 | Status: SHIPPED | OUTPATIENT
Start: 2024-10-26 | End: 2024-11-25

## 2024-10-26 RX ORDER — ALBUTEROL SULFATE 0.83 MG/ML
2.5 SOLUTION RESPIRATORY (INHALATION)
Status: COMPLETED | OUTPATIENT
Start: 2024-10-26 | End: 2024-10-26

## 2024-10-26 RX ORDER — ALBUTEROL SULFATE 90 UG/1
2 INHALANT RESPIRATORY (INHALATION) EVERY 6 HOURS PRN
Qty: 18 G | Refills: 0 | Status: SHIPPED | OUTPATIENT
Start: 2024-10-26 | End: 2025-10-26

## 2024-10-26 RX ORDER — IPRATROPIUM BROMIDE 0.5 MG/2.5ML
0.5 SOLUTION RESPIRATORY (INHALATION)
Status: COMPLETED | OUTPATIENT
Start: 2024-10-26 | End: 2024-10-26

## 2024-10-26 RX ORDER — PROMETHAZINE HYDROCHLORIDE AND DEXTROMETHORPHAN HYDROBROMIDE 6.25; 15 MG/5ML; MG/5ML
5 SYRUP ORAL EVERY 4 HOURS PRN
Qty: 118 ML | Refills: 0 | Status: SHIPPED | OUTPATIENT
Start: 2024-10-26 | End: 2024-11-05

## 2024-10-26 RX ADMIN — ALBUTEROL SULFATE 2.5 MG: 0.83 SOLUTION RESPIRATORY (INHALATION) at 01:10

## 2024-10-26 RX ADMIN — DEXAMETHASONE SODIUM PHOSPHATE 10 MG: 10 INJECTION INTRAMUSCULAR; INTRAVENOUS at 01:10

## 2024-10-26 RX ADMIN — IPRATROPIUM BROMIDE 0.5 MG: 0.5 SOLUTION RESPIRATORY (INHALATION) at 01:10

## 2024-10-26 NOTE — PATIENT INSTRUCTIONS
The CDC recommends should the patient develop a fever or starts to feel worse after they have returned to normal activities, they should return home and away from others for at least another 24 hours.     Please drink plenty of fluids.  Please get plenty of rest.  Please return here or go to the Emergency Department for any concerns or worsening of condition.  Recommended using albuterol inhaler/nebulizer at least twice today and then starting tomorrow use as needed every 4-6 hours. Use flonase nasal spray twice daily (use 30 minutes before bedtime at night) and take daily zyrtec as needed as directed for five-ten days. Use promethazine-dm for cough as needed. Recommended taking vitamin D and zinc supplements for immune support.   Recommended for patient to drink hot tea with honey. Consider eating softer foods such as soup and broth for the next couple of days to prevent further throat irritation. Recommended for patient to refrain from acidic foods (such as tomatoes or caffeine) to prevent throat irritation for the next couple of days.   Recommend humidifier, hot showers for sinus drainage. Recommend elevating your head at night with two pillows to prevent post nasal drip and cough at night. Recommend over the counter benadryl allergy plus congestion that includes a nasal decongestant in it if you do not have good results with nasal spray at bed time.   You were given a steroid shot in the clinic.   If you do not have Hypertension or any history of palpitations, it is ok to take over the counter Sudafed or Mucinex D or Allegra-D or Claritin-D or Zyrtec-D.  If you do take one of the above, it is ok to combine that with plain over the counter Mucinex or Allegra or Claritin or Zyrtec.  If for example you are taking Zyrtec -D, you can combine that with Mucinex, but not Mucinex-D.  If you are taking Mucinex-D, you can combine that with plain Allegra or Claritin or Zyrtec.   If you do have Hypertension or palpitations,  it is safe to take Coricidin HBP for relief of sinus symptoms.  If not allergic, please take over the counter Tylenol (Acetaminophen) and/or Motrin (Ibuprofen) as directed for control of pain and/or fever.  Please follow up with your primary care doctor or specialist as needed.    If you  smoke, please stop smoking.

## 2024-10-26 NOTE — PROGRESS NOTES
"Subjective:      Patient ID: Daya Limon is a 44 y.o. female.    Vitals:  height is 5' 1" (1.549 m) and weight is 68 kg (149 lb 14.6 oz). Her oral temperature is 98.4 °F (36.9 °C). Her blood pressure is 116/80 and her pulse is 79. Her respiration is 20 and oxygen saturation is 100%.     Chief Complaint: Cough    44-year-old female presents to the clinic today with chief complaint of dry/productive cough, chills, intermittent sore throat, wheezing, shortness of breathe, and headache. Symptoms started 4 days ago with cold-like symptoms and have not improved, she notes the wheezing and cough has worsened.  Patient has tried albuterol inhaler with no relief.  She states saltwater gargles helped her sore throat.  Denies any recent travel.  Denies history of seasonal allergies. Denies hx of asthma.  Denies any pain or radiation of pain. Denies fever, chills, body aches, chest pain, abdominal pain, nausea, vomiting, diarrhea, or rashes.          Cough  This is a new problem. The current episode started in the past 7 days. The problem has been gradually worsening. The problem occurs constantly. The cough is Productive of sputum. Associated symptoms include headaches and nasal congestion. Pertinent negatives include no chest pain, chills, ear pain, fever, myalgias, postnasal drip, rash, sore throat, shortness of breath or wheezing. Nothing aggravates the symptoms. The treatment provided no relief. There is no history of asthma or environmental allergies.     Constitution: Negative for activity change, chills, sweating, fatigue, fever, generalized weakness and international travel in last 60 days.   HENT:  Negative for ear pain, congestion, postnasal drip, sinus pressure and sore throat.    Neck: Negative for neck pain.   Cardiovascular:  Negative for chest pain.   Eyes:  Negative for eye pain.   Respiratory:  Positive for cough. Negative for chest tightness, sputum production, shortness of breath, wheezing and asthma.  "   Gastrointestinal:  Negative for abdominal pain, nausea, vomiting and diarrhea.   Genitourinary:  Negative for dysuria.   Musculoskeletal:  Negative for pain and muscle ache.   Skin:  Negative for rash.   Allergic/Immunologic: Negative for environmental allergies, seasonal allergies and asthma.   Neurological:  Positive for headaches. Negative for dizziness.   Psychiatric/Behavioral:  Negative for nervous/anxious. The patient is not nervous/anxious.       Objective:     Physical Exam   Constitutional: She is oriented to person, place, and time. She appears well-developed. She is cooperative.  Non-toxic appearance. She appears ill. No distress.   HENT:   Head: Normocephalic and atraumatic.   Ears:   Right Ear: Hearing, tympanic membrane, external ear and ear canal normal.   Left Ear: Hearing, tympanic membrane, external ear and ear canal normal.   Nose: Mucosal edema present. No rhinorrhea, purulent discharge or nasal deformity. No epistaxis. Right sinus exhibits no maxillary sinus tenderness and no frontal sinus tenderness. Left sinus exhibits no maxillary sinus tenderness and no frontal sinus tenderness.   Mouth/Throat: Uvula is midline, oropharynx is clear and moist and mucous membranes are normal. No trismus in the jaw. Normal dentition. No uvula swelling. Cobblestoning present. No oropharyngeal exudate, posterior oropharyngeal edema, posterior oropharyngeal erythema or tonsillar abscesses. Tonsils are 1+ on the right. Tonsils are 1+ on the left. No tonsillar exudate.   Eyes: Conjunctivae and lids are normal. No scleral icterus. Extraocular movement intact   Neck: Trachea normal and phonation normal. Neck supple. No edema present. No erythema present. No neck rigidity present.   Cardiovascular: Normal rate, regular rhythm, normal heart sounds and normal pulses.   Pulmonary/Chest: Effort normal. No stridor. No respiratory distress. She has no decreased breath sounds. She has wheezes in the right upper field, the  "right middle field, the right lower field, the left upper field, the left middle field and the left lower field. She has no rhonchi. She has no rales.   Abdominal: Normal appearance.   Musculoskeletal: Normal range of motion.         General: No deformity. Normal range of motion.   Lymphadenopathy:     She has no cervical adenopathy.   Neurological: She is alert and oriented to person, place, and time. She exhibits normal muscle tone. Coordination normal.   Skin: Skin is warm, dry, intact, not diaphoretic and not pale.   Psychiatric: Her speech is normal and behavior is normal. Judgment and thought content normal.   Nursing note and vitals reviewed.      Assessment:     1. Viral URI with cough    2. Cough, unspecified type    3. Wheezing      XR CHEST PA AND LATERAL    Result Date: 10/26/2024  EXAMINATION: XR CHEST PA AND LATERAL CLINICAL HISTORY: Provided history is "  Cough, unspecified". TECHNIQUE: Frontal and lateral views of the chest were performed. COMPARISON: Chest radiograph, 02/26/2024. FINDINGS: Cardiac silhouette is not enlarged. No focal consolidation.  No sizable pleural effusion.  No pneumothorax.     No acute cardiopulmonary finding. Electronically signed by: Navneet Ni MD Date:    10/26/2024 Time:    14:20      Results for orders placed or performed in visit on 10/26/24   SARS Coronavirus 2 Antigen, POCT Manual Read    Collection Time: 10/26/24  1:05 PM   Result Value Ref Range    SARS Coronavirus 2 Antigen Negative Negative     Acceptable Yes        Plan:       Viral URI with cough  -     albuterol (PROVENTIL) 2.5 mg /3 mL (0.083 %) nebulizer solution; Take 3 mLs (2.5 mg total) by nebulization every 6 (six) hours as needed for Wheezing or Shortness of Breath. Rescue  Dispense: 90 mL; Refill: 0  -     promethazine-dextromethorphan (PROMETHAZINE-DM) 6.25-15 mg/5 mL Syrp; Take 5 mLs by mouth every 4 (four) hours as needed (as needed).  Dispense: 118 mL; Refill: 0    Cough, " unspecified type  -     SARS Coronavirus 2 Antigen, POCT Manual Read  -     XR CHEST PA AND LATERAL; Future; Expected date: 10/26/2024  -     promethazine-dextromethorphan (PROMETHAZINE-DM) 6.25-15 mg/5 mL Syrp; Take 5 mLs by mouth every 4 (four) hours as needed (as needed).  Dispense: 118 mL; Refill: 0    Wheezing  -     albuterol nebulizer solution 2.5 mg  -     ipratropium 0.02 % nebulizer solution 0.5 mg  -     XR CHEST PA AND LATERAL; Future; Expected date: 10/26/2024  -     dexAMETHasone injection 10 mg  -     albuterol (PROVENTIL) 2.5 mg /3 mL (0.083 %) nebulizer solution; Take 3 mLs (2.5 mg total) by nebulization every 6 (six) hours as needed for Wheezing or Shortness of Breath. Rescue  Dispense: 90 mL; Refill: 0      Patient had symptomatic improvement following breathing treatment, wheezing significantly improved on lung exam following breathing treatment. We had shared decision making for patient's treatment. We discussed side effects/alternatives/benefits/risk and patient would like to proceed with treatment plan. We also discussed other OTC treatment recommendations. Patient was counseled, explained with the test results meaning, expected course, and answered all of questions. Patient can take OTC Acetaminophen (Tylenol) and/or Ibuprofen (Motrin) as needed for pain relief and/or fever relief. Continue to drink plenty of fluids. Follow up with PCP in the next 1-2 weeks as needed.  Gave patient strict ER/urgent care precautions in case symptoms worsen or if any new concerns arise.

## 2024-11-06 DIAGNOSIS — R06.2 WHEEZING: ICD-10-CM

## 2024-11-06 DIAGNOSIS — J06.9 VIRAL URI WITH COUGH: ICD-10-CM

## 2024-11-07 RX ORDER — ALBUTEROL SULFATE 90 UG/1
2 AEROSOL, METERED RESPIRATORY (INHALATION) EVERY 6 HOURS PRN
Qty: 54 G | Refills: 3 | Status: SHIPPED | OUTPATIENT
Start: 2024-11-07

## 2024-11-07 NOTE — TELEPHONE ENCOUNTER
Refill Routing Note   Medication(s) are not appropriate for processing by [facility] for the following reason(s):        New or recently adjusted medication  No active prescription written by provider    [facility] action(s):  Defer               Appointments  past 12m or future 3m with PCP    Date Provider   Last Visit   9/30/2024 Esthela Abdi MD   Next Visit   Visit date not found Esthela Abdi MD   ED visits in past 90 days: 0        Note composed:3:09 AM 11/07/2024

## 2024-11-07 NOTE — TELEPHONE ENCOUNTER
No care due was identified.  [facility] Embedded Care Due Messages. Reference number: [id number].   11/06/2024 11:41:49 PM CST

## 2024-11-08 DIAGNOSIS — K21.9 GASTROESOPHAGEAL REFLUX DISEASE, UNSPECIFIED WHETHER ESOPHAGITIS PRESENT: ICD-10-CM

## 2024-11-08 RX ORDER — PANTOPRAZOLE SODIUM 40 MG/1
40 TABLET, DELAYED RELEASE ORAL
Qty: 90 TABLET | Refills: 4 | Status: SHIPPED | OUTPATIENT
Start: 2024-11-08 | End: 2025-11-08

## 2024-11-08 NOTE — TELEPHONE ENCOUNTER
No care due was identified.  [facility] Embedded Care Due Messages. Reference number: [id number].   11/08/2024 10:36:30 AM CST

## 2025-07-30 DIAGNOSIS — Z12.31 OTHER SCREENING MAMMOGRAM: ICD-10-CM

## 2025-08-26 ENCOUNTER — OFFICE VISIT (OUTPATIENT)
Dept: OPTOMETRY | Facility: CLINIC | Age: 45
End: 2025-08-26
Payer: COMMERCIAL

## 2025-08-26 DIAGNOSIS — H52.203 HYPEROPIC ASTIGMATISM, BILATERAL: ICD-10-CM

## 2025-08-26 DIAGNOSIS — R51.9 HEADACHE ABOVE THE EYE REGION: Primary | ICD-10-CM

## 2025-08-26 PROCEDURE — 92014 COMPRE OPH EXAM EST PT 1/>: CPT | Mod: S$GLB,,, | Performed by: OPTOMETRIST

## 2025-08-26 PROCEDURE — 99999 PR PBB SHADOW E&M-EST. PATIENT-LVL II: CPT | Mod: PBBFAC,,, | Performed by: OPTOMETRIST

## 2025-08-26 PROCEDURE — 1159F MED LIST DOCD IN RCRD: CPT | Mod: CPTII,S$GLB,, | Performed by: OPTOMETRIST

## 2025-08-29 ENCOUNTER — PATIENT MESSAGE (OUTPATIENT)
Dept: OPTOMETRY | Facility: CLINIC | Age: 45
End: 2025-08-29
Payer: COMMERCIAL